# Patient Record
Sex: FEMALE | Race: WHITE | NOT HISPANIC OR LATINO | Employment: FULL TIME | URBAN - METROPOLITAN AREA
[De-identification: names, ages, dates, MRNs, and addresses within clinical notes are randomized per-mention and may not be internally consistent; named-entity substitution may affect disease eponyms.]

---

## 2017-02-23 ENCOUNTER — OFFICE VISIT (OUTPATIENT)
Dept: OBSTETRICS AND GYNECOLOGY | Facility: CLINIC | Age: 42
End: 2017-02-23
Payer: COMMERCIAL

## 2017-02-23 VITALS
DIASTOLIC BLOOD PRESSURE: 70 MMHG | WEIGHT: 293 LBS | SYSTOLIC BLOOD PRESSURE: 110 MMHG | HEIGHT: 66 IN | BODY MASS INDEX: 47.09 KG/M2

## 2017-02-23 DIAGNOSIS — N92.6 IRREGULAR MENSTRUAL BLEEDING: ICD-10-CM

## 2017-02-23 DIAGNOSIS — Z01.419 WELL WOMAN EXAM WITH ROUTINE GYNECOLOGICAL EXAM: Primary | ICD-10-CM

## 2017-02-23 PROCEDURE — 88175 CYTOPATH C/V AUTO FLUID REDO: CPT

## 2017-02-23 PROCEDURE — 99999 PR PBB SHADOW E&M-EST. PATIENT-LVL III: CPT | Mod: PBBFAC,,, | Performed by: OBSTETRICS & GYNECOLOGY

## 2017-02-23 PROCEDURE — 99395 PREV VISIT EST AGE 18-39: CPT | Mod: S$GLB,,, | Performed by: OBSTETRICS & GYNECOLOGY

## 2017-02-23 RX ORDER — TRAMADOL HYDROCHLORIDE 50 MG/1
TABLET ORAL
Refills: 1 | COMMUNITY
Start: 2017-02-08 | End: 2021-06-25

## 2017-02-23 NOTE — MR AVS SNAPSHOT
"    [facility]  [address]  [address], [address]  [place] [address]  Phone: [phone]                  Cristina Sprague   2017 3:15 PM   Office Visit    Description:  Female : 1975   Provider:  Jones Rowley MD   Department:  [facility]           Reason for Visit     Gynecologic Exam                To Do List           Future Appointments        Provider Department Dept Phone    2017 3:15 PM Jones Rowley MD [phone]      Goals (5 Years of Data)     None      [facility]     [facility] On Call Nurse Care Line -  Assistance  Registered nurses in the [facility] On Call Center provide clinical advisement, health education, appointment booking, and other advisory services.  Call for this free service at [phone].             Medications           Message regarding Medications     Verify the changes and/or additions to your medication regime listed below are the same as discussed with your clinician today.  If any of these changes or additions are incorrect, please notify your healthcare provider.        STOP taking these medications     predniSONE (DELTASONE) 10 MG tablet Take 1 tablet (10 mg total) by mouth as directed. Take 3 for 3 days, then  Take 2 for 3 days, then  Take 1 for 3 days, then  Take 1/2 for 4 days, then stop    cyclobenzaprine (FLEXERIL) 5 MG tablet Take 5 mg by mouth 3 (three) times daily.           Verify that the below list of medications is an accurate representation of the medications you are currently taking.  If none reported, the list may be blank. If incorrect, please contact your healthcare provider. Carry this list with you in case of emergency.           Current Medications     tramadol (ULTRAM) 50 mg tablet TAKE 1 TABLET BY MOUTH EVERY 12 TO 24 HOURS AS NEEDED FOR PAIN           Clinical Reference Information           Your Vitals Were     BP Height Weight Last Period BMI    110/70 5' 6" (1.676 m) 146.7 kg " (323 lb 6.6 oz) 02/01/2017 52.2 kg/m2      Blood Pressure          Most Recent Value    BP  110/70      Allergies as of 2/23/2017     No Known Allergies      Immunizations Administered on Date of Encounter - 2/23/2017     None      Language Assistance Services     ATTENTION: Language assistance services are available, free of charge. Please call [phone].      ATENCIÓN: Si habla español, tiene a guzman disposición servicios gratuitos de asistencia lingüística. Llame al [phone].     CHÚ Ý: N?u b?n nói Ti?ng Vi?t, có các d?ch v? h? tr? ngôn ng? mi?n phí dành cho b?n. G?i s? [phone].         [facility] complies with applicable Federal civil rights laws and does not discriminate on the basis of race, color, national origin, age, disability, or sex.

## 2017-02-23 NOTE — PROGRESS NOTES
Subjective:       Patient ID: Cristina Sprague is a 41 y.o. female.    Chief Complaint: Gynecologic Exam (cycle for 19 days just stopped on the  )    Patient last came to the office after the delivery of her most recent child which was 4 years ago.  After delivery she had a tubal ligation (post  tubal).  Patient been having regular periods but most recently had an approximate 20 day episode of spotting.  Last year she has lost 60 pounds (present weight 323).  Examination is normal as best can be done.  No obvious bleeding point notable vaginally or at the cervix.  Irregular bleeding approaching the fifth decade was discussed.  Also effective patient's weight and weight change was discussed.  Plan was formulated so that if the patient sees another episode of prolonged bleeding or other menstrual abnormality, an ultrasound will be ordered to assess the anatomical structures in the pelvis and hormonal management will be started for cycle control.  Tissue assessment will be done if needed based on the ultrasound and other modalities.    HPI  Review of Systems   Gastrointestinal: Negative for abdominal distention, abdominal pain, constipation and nausea.   Genitourinary: Negative for dyspareunia, dysuria, genital sores, pelvic pain, vaginal bleeding and vaginal discharge.       Objective:      Physical Exam   Constitutional: She appears well-developed and well-nourished.   Pulmonary/Chest: Right breast exhibits no mass, no nipple discharge, no skin change and no tenderness. Left breast exhibits no mass, no nipple discharge, no skin change and no tenderness.   Abdominal: Soft. Bowel sounds are normal. She exhibits no distension and no mass. There is no tenderness. There is no rebound and no guarding. Hernia confirmed negative in the right inguinal area and confirmed negative in the left inguinal area.   Genitourinary: Rectum normal, vagina normal and uterus normal. No breast tenderness or discharge. There is no  lesion on the right labia. There is no lesion on the left labia. Uterus is not fixed and not tender. Cervix exhibits no motion tenderness, no discharge and no friability. Right adnexum displays no mass, no tenderness and no fullness. Left adnexum displays no mass, no tenderness and no fullness. No tenderness in the vagina. No vaginal discharge found.   Lymphadenopathy:        Right: No inguinal adenopathy present.        Left: No inguinal adenopathy present.       Assessment:       1. Well woman exam with routine gynecological exam    2. Irregular menstrual bleeding        Plan:         annual GYN visit with Pap smear, mammogram ordered after age 40 is reached in July of this year, conservative management (observation and journaling) for irregular bleeding of one episode.  Patient to call for any change in condition.

## 2017-03-13 ENCOUNTER — TELEPHONE (OUTPATIENT)
Dept: OBSTETRICS AND GYNECOLOGY | Facility: CLINIC | Age: 42
End: 2017-03-13

## 2017-03-13 NOTE — LETTER
March 14, 2017    Cristina Sprague  [address]  [place] [address]             [facility]  [address]  [address], [address]  [place] [address]  Phone: [phone] Dear Ms. Darcie:    The results of your most recent Pap smear are normal. This means that no cancerous or precancerous cells were seen. We recommend that you come back in 1 year for your annual exam and 1 years for your next Pap smear.    If you have any questions or concerns, please don't hesitate to call.    Sincerely,        Jones Anderson

## 2017-03-15 ENCOUNTER — OFFICE VISIT (OUTPATIENT)
Dept: ORTHOPEDICS | Facility: CLINIC | Age: 42
End: 2017-03-15
Payer: COMMERCIAL

## 2017-03-15 VITALS — WEIGHT: 293 LBS | RESPIRATION RATE: 16 BRPM | BODY MASS INDEX: 47.09 KG/M2 | HEIGHT: 66 IN

## 2017-03-15 DIAGNOSIS — M17.11 PRIMARY OSTEOARTHRITIS OF RIGHT KNEE: Primary | ICD-10-CM

## 2017-03-15 DIAGNOSIS — G89.29 CHRONIC PAIN OF RIGHT KNEE: ICD-10-CM

## 2017-03-15 DIAGNOSIS — M25.561 CHRONIC PAIN OF RIGHT KNEE: ICD-10-CM

## 2017-03-15 PROCEDURE — 99999 PR PBB SHADOW E&M-EST. PATIENT-LVL III: CPT | Mod: PBBFAC,,, | Performed by: PHYSICIAN ASSISTANT

## 2017-03-15 PROCEDURE — 1160F RVW MEDS BY RX/DR IN RCRD: CPT | Mod: S$GLB,,, | Performed by: PHYSICIAN ASSISTANT

## 2017-03-15 PROCEDURE — 99203 OFFICE O/P NEW LOW 30 MIN: CPT | Mod: S$GLB,,, | Performed by: PHYSICIAN ASSISTANT

## 2017-03-15 RX ORDER — AZITHROMYCIN 500 MG/1
500 TABLET, FILM COATED ORAL DAILY
COMMUNITY
End: 2018-03-19

## 2017-03-15 NOTE — LETTER
March 15, 2017      Quan Davis MD  [address] [address]           [facility]  [address]  [place] [address]  Phone: [phone]          Patient: Cristina Sprague   MR Number: [id number]   YOB: 1975   Date of Visit: 3/15/2017       Dear Dr. Quan Davis:    Thank you for referring Cristina Sprague to me for evaluation. Attached you will find relevant portions of my assessment and plan of care.    If you have questions, please do not hesitate to call me. I look forward to following Cristina Sprague along with you.    Sincerely,    Nohelia Cobian PA-C    Enclosure  CC:  No Recipients    If you would like to receive this communication electronically, please contact [email or web address] or [phone] to request more information on [facility] access.    For providers and/or their staff who would like to refer a patient to [facility], please contact us through our one-stop-shop provider referral line, [facility], at [phone].    If you feel you have received this communication in error or would no longer like to receive these types of communications, please e-mail [email or web address]

## 2017-03-15 NOTE — PROGRESS NOTES
"  SUBJECTIVE:     Chief Complaint : right knee pain    History of Present Illness:  Cristina Sprague is a 41 y.o. female seen in clinic today with a chief complaint of chronic right knee pain. Pt has been [facility]. [facility]. He has given her steroid injections, most recently 2/8/2017. This helped for a few days only.  He referred her here to discuss TKA. Pain is severe and limits ADL. She has two small children. She has been trying to walk to lose weight but this is becoming too painful. She has lost 65 pounds over the past year. She does not use assistive device.     Pertinent past medical history includes bilateral PE in 2006 that was attributed to taking oral contraceptives. She has had two DVTs since, both during pregnancies. She is not on any long term anticoagulant. She states that she was worked up for coagulopathies at  (?) and that she is positive for MFTHR gene mutation.     Past Medical History:   Diagnosis Date    Ectopic pregnancy     History of blood clots     Morbid obesity     Pulmonary embolism      Review of Systems:  Constitutional: no fever or chills  ENT: no nasal congestion or sore throat  Respiratory: no cough or shortness of breath  Cardiovascular: no chest pain or palpitations  Gastrointestinal: no nausea or vomiting, tolerating diet  Genitourinary: no hematuria or dysuria  Integument/Breast: no rash or pruritis  Hematologic/Lymphatic: no easy bruising or lymphadenopathy  Musculoskeletal: see HPI  Neurological: no seizures or tremors  Behavioral/Psych: no auditory or visual hallucinations    OBJECTIVE:     PHYSICAL EXAM:  Resp. rate 16, height 5' 6" (1.676 m), weight (!) 147.2 kg (324 lb 8.3 oz), last menstrual period 02/01/2017, unknown if currently breastfeeding.   General Appearance: WDWN, NAD  Gait: Abnormal  Neuro/Psych: Mood & affect appropriate  Lungs: Respirations equal and unlabored.   CV: 2+ bilateral upper and lower extremity " pulses.   Skin: Intact throughout LE  Extremities: No LE edema    Right Knee Exam  Range of Motion:0-95 active   Effusion:yes  Condition of skin:intact  Location of tenderness:Medial joint line, Lateral joint line and Patella   Strength:4 of 5 quadriceps strength and 5 of 5 hamstring strength  Stability: LCL: grade II  Significant varus deformity    Left Knee Exam  Range of Motion:0-110 active   Effusion:none  Condition of skin:intact  Location of tenderness:None     Right Hip Examination: no pain with PROM     RADIOGRAPHS: AP, lateral and merchant right knee x-rays reviewed today on disc from Dr. Davis's office. Xrays reveal advanced degenerative change with severe varus deformity and destruction of medial bone     ASSESSMENT/PLAN:   Advanced OA right knee  - Had long discussion with patient about risks of surgery including her obesity (BMI 52) as well as her history of PE and DVT. She understands risks and that she may be unable to have surgery until she loses more weight. I encouraged her to get into a swimming pool for exercise instead of walking. I will discuss with surgeons and call her back next week.

## 2017-03-21 ENCOUNTER — DOCUMENTATION ONLY (OUTPATIENT)
Dept: ORTHOPEDICS | Facility: CLINIC | Age: 42
End: 2017-03-21

## 2017-03-21 ENCOUNTER — PATIENT MESSAGE (OUTPATIENT)
Dept: ORTHOPEDICS | Facility: CLINIC | Age: 42
End: 2017-03-21

## 2017-03-21 DIAGNOSIS — M17.11 PRIMARY OSTEOARTHRITIS OF RIGHT KNEE: Primary | ICD-10-CM

## 2017-03-21 RX ORDER — MELOXICAM 15 MG/1
15 TABLET ORAL DAILY
Qty: 30 TABLET | Refills: 1 | Status: SHIPPED | OUTPATIENT
Start: 2017-03-21 | End: 2017-04-20

## 2017-03-21 NOTE — PROGRESS NOTES
Spoke to patient. Would like her to get into weight loss program, obtain records about coagulopathy workup from [facility]. Offered referral to pain management for geniculate nerve block. Sent prescription for Mobic to pharmacy. She will call us when she decides how she would like to proceed.

## 2017-03-27 ENCOUNTER — PATIENT MESSAGE (OUTPATIENT)
Dept: ORTHOPEDICS | Facility: CLINIC | Age: 42
End: 2017-03-27

## 2017-12-13 ENCOUNTER — TELEPHONE (OUTPATIENT)
Dept: OBSTETRICS AND GYNECOLOGY | Facility: CLINIC | Age: 42
End: 2017-12-13

## 2017-12-13 NOTE — TELEPHONE ENCOUNTER
----- Message from Catrachita Nation sent at 12/13/2017 12:30 PM [facility] -----  No. [phone]    Patient has been having her cycle for 3 weeks.  She is having night sweats also.   She would like an appointment soon.     Please call.

## 2017-12-14 ENCOUNTER — TELEPHONE (OUTPATIENT)
Dept: OBSTETRICS AND GYNECOLOGY | Facility: CLINIC | Age: 42
End: 2017-12-14

## 2017-12-14 NOTE — TELEPHONE ENCOUNTER
----- Message from Chloe Robb sent at 12/14/2017 11:18 AM CST -----  Contact: [phone]  Patient called in returning your call. Please advise.

## 2017-12-15 ENCOUNTER — HOSPITAL ENCOUNTER (OUTPATIENT)
Dept: RADIOLOGY | Facility: HOSPITAL | Age: 42
Discharge: HOME | End: 2017-12-15
Attending: OBSTETRICS & GYNECOLOGY
Payer: COMMERCIAL

## 2017-12-15 DIAGNOSIS — Z01.419 WELL WOMAN EXAM WITH ROUTINE GYNECOLOGICAL EXAM: ICD-10-CM

## 2017-12-15 PROCEDURE — 77067 SCR MAMMO BI INCL CAD: CPT | Mod: TC

## 2017-12-15 PROCEDURE — 77063 BREAST TOMOSYNTHESIS BI: CPT | Mod: 26,,, | Performed by: RADIOLOGY

## 2017-12-15 PROCEDURE — 77067 SCR MAMMO BI INCL CAD: CPT | Mod: 26,,, | Performed by: RADIOLOGY

## 2017-12-22 ENCOUNTER — PATIENT MESSAGE (OUTPATIENT)
Dept: OBSTETRICS AND GYNECOLOGY | Facility: CLINIC | Age: 42
End: 2017-12-22

## 2017-12-22 RX ORDER — FLUCONAZOLE 150 MG/1
150 TABLET ORAL DAILY
Qty: 1 TABLET | Refills: 0 | Status: SHIPPED | OUTPATIENT
Start: 2017-12-22 | End: 2017-12-23

## 2017-12-22 NOTE — TELEPHONE ENCOUNTER
Patient has complaints of a yeast infection. Requesting rx of Diflucan.    Please sign if approved.

## 2018-03-19 ENCOUNTER — PROCEDURE VISIT (OUTPATIENT)
Dept: OBSTETRICS AND GYNECOLOGY | Facility: CLINIC | Age: 43
End: 2018-03-19
Payer: COMMERCIAL

## 2018-03-19 ENCOUNTER — OFFICE VISIT (OUTPATIENT)
Dept: OBSTETRICS AND GYNECOLOGY | Facility: CLINIC | Age: 43
End: 2018-03-19
Payer: COMMERCIAL

## 2018-03-19 VITALS
BODY MASS INDEX: 47.09 KG/M2 | WEIGHT: 293 LBS | HEIGHT: 66 IN | DIASTOLIC BLOOD PRESSURE: 80 MMHG | SYSTOLIC BLOOD PRESSURE: 110 MMHG

## 2018-03-19 DIAGNOSIS — N92.4 EXCESSIVE BLEEDING IN PREMENOPAUSAL PERIOD: Primary | ICD-10-CM

## 2018-03-19 DIAGNOSIS — Z80.3 FAMILY HISTORY OF BREAST CANCER: ICD-10-CM

## 2018-03-19 DIAGNOSIS — N92.4 EXCESSIVE BLEEDING IN PREMENOPAUSAL PERIOD: ICD-10-CM

## 2018-03-19 PROCEDURE — 99213 OFFICE O/P EST LOW 20 MIN: CPT | Mod: 25,S$GLB,, | Performed by: OBSTETRICS & GYNECOLOGY

## 2018-03-19 PROCEDURE — 76830 TRANSVAGINAL US NON-OB: CPT | Mod: S$GLB,,, | Performed by: OBSTETRICS & GYNECOLOGY

## 2018-03-19 PROCEDURE — 99999 PR PBB SHADOW E&M-EST. PATIENT-LVL IV: CPT | Mod: PBBFAC,,, | Performed by: OBSTETRICS & GYNECOLOGY

## 2018-03-19 RX ORDER — APIXABAN 5 MG/1
5 TABLET, FILM COATED ORAL 2 TIMES DAILY
Refills: 11 | COMMUNITY
Start: 2018-02-27

## 2018-03-19 NOTE — PROGRESS NOTES
Patient presents as since today for consultation and assessment for excessive uterine bleeding.  She has bleeding for 2-1/2 weeks per month and it is relatively heavy.  Patient has had another blood clot this time in her lower extremity near the ankle.  She is placed on Eliquis and has been told that she will require anticoagulation essentially for life.  She is concerned about the bleeding and has presented for discussion of options.  Patient has tubal ligation and history of ectopic pregnancy managed by medical means.  Discussion has already occurred between the patient and her primary care physician.  Primary care physician thinks an endometrial ablation would be a good way to control the patient's bleeding.  Other options were discussed with the patient including Mirena IUD and Depo-Provera.  The still present somewhat of a risk due to hormone exposure.  Patient understands that there is a bleeding risk with anticoagulant but the ablation does not require suture or any incision Allis complications are encountered.  Patient will discuss this with her primary care physician to see if it would be safe to stop her medication for a short interval prior to the procedure and started again immediately afterwards.  Ultrasound is done since body habitus precludes a good pelvic examination on this patient.  Also assessment of the internal endometrial cavity is part of our plan for ablation if his to be successful.  Case request will be placed to start moving towards endometrial ablation outpatient procedure.     Uterus by ultrasound shows a 9.6 mm endometrial stripe, a right hydrosalpinx probably residual from her previous ectopic, and the uterine length of 11 cm.  This puts the uterine structure at the upper limits of size for ablation but the benefits would be significant for this patient.  She will consult with her primary care physician about reducing her medications prior to procedure.

## 2018-04-23 ENCOUNTER — OFFICE VISIT (OUTPATIENT)
Dept: URGENT CARE | Facility: CLINIC | Age: 43
End: 2018-04-23
Payer: COMMERCIAL

## 2018-04-23 VITALS
BODY MASS INDEX: 47.09 KG/M2 | TEMPERATURE: 97 F | WEIGHT: 293 LBS | HEIGHT: 66 IN | RESPIRATION RATE: 18 BRPM | OXYGEN SATURATION: 97 % | SYSTOLIC BLOOD PRESSURE: 132 MMHG | HEART RATE: 78 BPM | DIASTOLIC BLOOD PRESSURE: 89 MMHG

## 2018-04-23 DIAGNOSIS — M25.562 ACUTE PAIN OF LEFT KNEE: Primary | ICD-10-CM

## 2018-04-23 PROCEDURE — 99213 OFFICE O/P EST LOW 20 MIN: CPT | Mod: 25,S$GLB,, | Performed by: INTERNAL MEDICINE

## 2018-04-23 PROCEDURE — 96372 THER/PROPH/DIAG INJ SC/IM: CPT | Mod: S$GLB,,, | Performed by: INTERNAL MEDICINE

## 2018-04-23 RX ORDER — NAPROXEN 500 MG/1
500 TABLET ORAL 2 TIMES DAILY WITH MEALS
Qty: 20 TABLET | Refills: 0 | Status: SHIPPED | OUTPATIENT
Start: 2018-04-24 | End: 2018-05-04

## 2018-04-23 RX ORDER — KETOROLAC TROMETHAMINE 30 MG/ML
60 INJECTION, SOLUTION INTRAMUSCULAR; INTRAVENOUS
Status: COMPLETED | OUTPATIENT
Start: 2018-04-23 | End: 2018-04-23

## 2018-04-23 RX ADMIN — KETOROLAC TROMETHAMINE 60 MG: 30 INJECTION, SOLUTION INTRAMUSCULAR; INTRAVENOUS at 10:04

## 2018-04-23 NOTE — LETTER
April 23, 2018      [facility] [place]  [address]  [place] [address]  Phone: [phone]  Fax: [phone]       Patient: Cristina Sprague   YOB: 1975  Date of Visit: 04/23/2018    To Whom It May Concern:    Evy Sprague  was at [facility] on 04/23/2018. She may return to work/school on 4/25/2018 with no restrictions. If you have any questions or concerns, or if I can be of further assistance, please do not hesitate to contact me.    Sincerely,    Ramses Blank MD

## 2018-04-23 NOTE — PROGRESS NOTES
"Subjective:       Patient ID: Cristina Sprague is a 42 y.o. female.    Vitals:  height is 5' 6" (1.676 m) and weight is 145.2 kg (320 lb) (abnormal). Her temperature is 97.1 °F (36.2 °C). Her blood pressure is 132/89 and her pulse is 78. Her respiration is 18 and oxygen saturation is 97%.     Chief Complaint: Knee Pain    Left knee pain that began on Friday,no trauma      Knee Pain    The incident occurred 3 to 5 days ago. There was no injury mechanism. The pain is present in the left knee. The pain is at a severity of 10/10. The pain is severe. The pain has been constant since onset. She reports no foreign bodies present. The symptoms are aggravated by movement. She has tried acetaminophen for the symptoms. The treatment provided no relief.     Review of Systems   Constitution: Negative for chills and fever.   HENT: Negative for sore throat.    Eyes: Negative for blurred vision.   Cardiovascular: Negative for chest pain.   Respiratory: Negative for shortness of breath.    Skin: Negative for rash.   Musculoskeletal: Positive for joint pain. Negative for back pain.   Gastrointestinal: Negative for abdominal pain, diarrhea, nausea and vomiting.   Neurological: Negative for headaches.   Psychiatric/Behavioral: The patient is not nervous/anxious.        Objective:      Physical Exam   Constitutional: She appears well-developed and well-nourished.   Cardiovascular: Intact distal pulses.    Musculoskeletal:   Pain L knee with ROM   Nursing note and vitals reviewed.      Assessment:       1. Acute pain of left knee        Plan:         Acute pain of left knee  -     X-Ray Knee 3 View Left; Future; Expected date: 04/23/2018    Other orders  -     ketorolac injection 60 mg; Inject 2 mLs (60 mg total) into the muscle one time.  -     naproxen (NAPROSYN) 500 MG tablet; Take 1 tablet (500 mg total) by mouth 2 (two) times daily with meals.  Dispense: 20 tablet; Refill: 0          "

## 2018-05-31 ENCOUNTER — TELEPHONE (OUTPATIENT)
Dept: ADMINISTRATIVE | Facility: OTHER | Age: 43
End: 2018-05-31

## 2018-06-22 ENCOUNTER — TELEPHONE (OUTPATIENT)
Dept: INTERNAL MEDICINE | Facility: CLINIC | Age: 43
End: 2018-06-22

## 2018-06-22 NOTE — TELEPHONE ENCOUNTER
----- Message from Eliane Modi sent at 6/22/2018  1:51 PM CDT -----  Contact: Diana with Dr Black Office/[phone] ext [phone]  Diana called in regards needing to find out If patient can be off ELIQUIS 5 mg Tab for 2-3 days. Patient is having an injection on Tuesday 06/26/18. Please call and advise. Thank you

## 2018-06-22 NOTE — TELEPHONE ENCOUNTER
----- Message from Eliane Modi sent at 6/22/2018  1:51 PM CDT -----  Contact: Diana with Dr Black Office/[phone] ext [phone]  Diana called in regards needing to find out If patient can be off ELIQUIS 5 mg Tab for 2-3 days. Patient is having an injection on Tuesday 06/26/18. Please call and advise. Thank you

## 2018-06-25 ENCOUNTER — TELEPHONE (OUTPATIENT)
Dept: INTERNAL MEDICINE | Facility: CLINIC | Age: 43
End: 2018-06-25

## 2019-08-29 ENCOUNTER — TELEPHONE (OUTPATIENT)
Dept: ORTHOPEDICS | Facility: CLINIC | Age: 44
End: 2019-08-29

## 2019-08-29 NOTE — TELEPHONE ENCOUNTER
"Contacted patient regarding her appointment scheduled as "knee surgery." Patient expressed she is looking to see a surgeon for her knee pain. I explained that Dr. Hardwick is not a surgeon but he would still be happy to see her and refer to the appropirate physician. She expressed she would like to see a surgeon directly and requested I provide her with a name. I suggested Dr. Oscar and Dr. Sagastume. Patient expressed her understanding.   "

## 2019-12-17 ENCOUNTER — OFFICE VISIT (OUTPATIENT)
Dept: URGENT CARE | Facility: CLINIC | Age: 44
End: 2019-12-17
Payer: MEDICAID

## 2019-12-17 VITALS
SYSTOLIC BLOOD PRESSURE: 118 MMHG | HEIGHT: 66 IN | HEART RATE: 89 BPM | RESPIRATION RATE: 16 BRPM | BODY MASS INDEX: 47.09 KG/M2 | DIASTOLIC BLOOD PRESSURE: 83 MMHG | TEMPERATURE: 97 F | OXYGEN SATURATION: 100 % | WEIGHT: 293 LBS

## 2019-12-17 DIAGNOSIS — M62.838 MUSCLE SPASM: ICD-10-CM

## 2019-12-17 DIAGNOSIS — M54.9 BACK PAIN, UNSPECIFIED BACK LOCATION, UNSPECIFIED BACK PAIN LATERALITY, UNSPECIFIED CHRONICITY: ICD-10-CM

## 2019-12-17 DIAGNOSIS — S29.011A MUSCLE STRAIN OF CHEST WALL, INITIAL ENCOUNTER: Primary | ICD-10-CM

## 2019-12-17 DIAGNOSIS — Z20.828 EXPOSURE TO THE FLU: ICD-10-CM

## 2019-12-17 LAB
B-HCG UR QL: NEGATIVE
BILIRUB UR QL STRIP: NEGATIVE
CTP QC/QA: YES
CTP QC/QA: YES
FLUAV AG NPH QL: NEGATIVE
FLUBV AG NPH QL: NEGATIVE
GLUCOSE UR QL STRIP: NEGATIVE
KETONES UR QL STRIP: NEGATIVE
LEUKOCYTE ESTERASE UR QL STRIP: NEGATIVE
PH, POC UA: 6.5 (ref 5–8)
POC BLOOD, URINE: NEGATIVE
POC NITRATES, URINE: NEGATIVE
PROT UR QL STRIP: NEGATIVE
SP GR UR STRIP: 1.02 (ref 1–1.03)
UROBILINOGEN UR STRIP-ACNC: NORMAL (ref 0.1–1.1)

## 2019-12-17 PROCEDURE — 99214 OFFICE O/P EST MOD 30 MIN: CPT | Mod: 25,S$GLB,, | Performed by: NURSE PRACTITIONER

## 2019-12-17 PROCEDURE — 81003 POCT URINALYSIS, DIPSTICK, AUTOMATED, W/O SCOPE: ICD-10-PCS | Mod: QW,S$GLB,, | Performed by: NURSE PRACTITIONER

## 2019-12-17 PROCEDURE — 87804 POCT INFLUENZA A/B: ICD-10-PCS | Mod: QW,S$GLB,, | Performed by: NURSE PRACTITIONER

## 2019-12-17 PROCEDURE — 81025 URINE PREGNANCY TEST: CPT | Mod: S$GLB,,, | Performed by: NURSE PRACTITIONER

## 2019-12-17 PROCEDURE — 99214 PR OFFICE/OUTPT VISIT, EST, LEVL IV, 30-39 MIN: ICD-10-PCS | Mod: 25,S$GLB,, | Performed by: NURSE PRACTITIONER

## 2019-12-17 PROCEDURE — 81003 URINALYSIS AUTO W/O SCOPE: CPT | Mod: QW,S$GLB,, | Performed by: NURSE PRACTITIONER

## 2019-12-17 PROCEDURE — 81025 POCT URINE PREGNANCY: ICD-10-PCS | Mod: S$GLB,,, | Performed by: NURSE PRACTITIONER

## 2019-12-17 PROCEDURE — 87804 INFLUENZA ASSAY W/OPTIC: CPT | Mod: QW,S$GLB,, | Performed by: NURSE PRACTITIONER

## 2019-12-17 RX ORDER — OSELTAMIVIR PHOSPHATE 75 MG/1
75 CAPSULE ORAL 2 TIMES DAILY
Qty: 10 CAPSULE | Refills: 0 | Status: SHIPPED | OUTPATIENT
Start: 2019-12-17 | End: 2021-06-25

## 2019-12-17 RX ORDER — METHOCARBAMOL 500 MG/1
500 TABLET, FILM COATED ORAL 3 TIMES DAILY PRN
Qty: 15 TABLET | Refills: 0 | Status: SHIPPED | OUTPATIENT
Start: 2019-12-17 | End: 2019-12-22

## 2019-12-17 RX ORDER — NAPROXEN 500 MG/1
500 TABLET ORAL 2 TIMES DAILY WITH MEALS
Qty: 14 TABLET | Refills: 0 | Status: SHIPPED | OUTPATIENT
Start: 2019-12-17 | End: 2019-12-24

## 2019-12-17 RX ORDER — KETOROLAC TROMETHAMINE 30 MG/ML
60 INJECTION, SOLUTION INTRAMUSCULAR; INTRAVENOUS
Status: COMPLETED | OUTPATIENT
Start: 2019-12-17 | End: 2019-12-17

## 2019-12-17 RX ADMIN — KETOROLAC TROMETHAMINE 60 MG: 30 INJECTION, SOLUTION INTRAMUSCULAR; INTRAVENOUS at 11:12

## 2019-12-17 NOTE — PATIENT INSTRUCTIONS
PLEASE READ YOUR DISCHARGE INSTRUCTIONS ENTIRELY AS IT CONTAINS IMPORTANT INFORMATION.       DO NOT TAKE ANY MORE NAPROXEN OR IBUPROFEN FOR 24 HOURS AS YOU RECEIVED A LARGE DOSE OF IT VIA INJECTION    Naproxen for pain. Eat with this medication. Consider taking an over the counter antacid (zantac, Nexium, Prilosec) to protect your stomach.      Ice to the area. Try an over the counter pain cream like salon pas, icy hot, bioflex.    Robaxin is a muscle relaxer - this medication will make you drowsy. Please do not drive or operate machinery while taking this. Best to take it at night or during the day if you are not driving or going to work. Please take a half first to see how it affects you.     Avoid heavy lifting, straining.     Take Tamiflu as prescribed.     Please follow up with your regular doctor for further treatment if your symptoms do not improve.      Please arrange follow up with your primary medical clinic as soon as possible. You must understand that you've received an Urgent Care treatment only and that you may be released before all of your medical problems are known or treated. You, the patient, will arrange for follow up as instructed. If your symptoms worsen or fail to improve you should go to the Emergency Room.  WE CANNOT RULE OUT ALL POSSIBLE CAUSES OF YOUR SYMPTOMS IN THE URGENT CARE SETTING PLEASE GO TO THE ER IF YOU FEELS YOUR CONDITION IS WORSENING OR YOU WOULD LIKE EMERGENT EVALUATION.          Chest Strain    You have a chest strain. This happens when the muscles between the ribs stretch and tear. This may occur when you have a severe cough. It may also happen after strenuous lifting or twisting injuries of the upper back.  A chest strain usually causes pain when you move or take a deep breath. The strain may take a few days to a few weeks to heal.  Home care  Follow these guidelines when caring for yourself at home:  · Rest. Dont do any heavy lifting or strenuous activity. Dont do any  activity that causes pain.  · If you have a severe cough, use a cough syrup with dextromethorphan, unless another cough medicine was prescribed. If you have high blood pressure, check with your health care provider or pharmacist before using an over-the-counter cough medicine.  · You may use acetaminophen or ibuprofen to control pain, unless another medicine was prescribed. If you have chronic liver or kidney disease, talk with your provider before using these medicines. Also talk with your provider if youve had a stomach ulcer or GI bleeding.  Follow-up care  Follow up with your health care provider, or as advised.  When to seek medical advice  Call your health care provider right away if any of these occur:  · A change in the type of pain. This means if it feels different, gets worse, lasts longer, or begins to spread into your shoulder, arm, neck, jaw, or back.  · Pain doesnt go away in 1 week  · Shortness of breath, difficulty breathing, or fast breathing  · Pain gets worse when you breathe  · Cough with dark-colored sputum (phlegm) or blood  · Weakness, dizziness, or fainting  · Fever of 101ºF (38.3ºC) or higher, or as directed by your health care provider   Date Last Reviewed: 2/15/2015  © 8861-2982 [email or web address]. [address] [place] [address], [place] [address]. All rights reserved. This information is not intended as a substitute for professional medical care. Always follow your healthcare professional's instructions.

## 2019-12-17 NOTE — PROGRESS NOTES
"Subjective:       Patient ID: Cristina Sprague is a 44 y.o. female.    Vitals:  height is 5' 6" (1.676 m) and weight is 145.2 kg (320 lb) (abnormal). Her oral temperature is 97 °F (36.1 °C). Her blood pressure is 118/83 and her pulse is 89. Her respiration is 16 and oxygen saturation is 100%.     Chief Complaint: Back Pain    Pt here today for c/o right side pain that began last night. Pt describes the pain as shooting from anterior right ribs around side. Pt also concerned about flu exposure. She endorses sinus pressure, frontal headache, and PND that began today. She took Toradol for symptoms last night, but has not had anything yet today. Pain has improved since last night. She denies abdominal pain, CVA tenderness, N/V/D, rash, urinary urgency, frequency, dysuria, hematuria, or foul odor to urine.    Back Pain   This is a new problem. Episode onset: 3 days. The problem has been gradually worsening since onset. The pain is present in the costovertebral angle. The quality of the pain is described as shooting and stabbing. Radiates to: Radiates from anterior right ribs to side. The pain is at a severity of 5/10. The pain is moderate. The pain is the same all the time. Pertinent negatives include no abdominal pain, bladder incontinence, bowel incontinence or numbness. Risk factors include obesity. She has tried NSAIDs (Toradol) for the symptoms. The treatment provided mild relief.       Constitution: Negative for fatigue.   Gastrointestinal: Negative for abdominal pain and bowel incontinence.   Genitourinary: Negative for urgency, bladder incontinence and hematuria.   Musculoskeletal: Positive for back pain. Negative for muscle cramps and history of spine disorder.   Skin: Negative for rash.   Neurological: Negative for coordination disturbances, numbness and tingling.       Objective:      Physical Exam   Constitutional: She is oriented to person, place, and time. Vital signs are normal. She appears well-developed " and well-nourished. She is active and cooperative.  Non-toxic appearance. She does not have a sickly appearance. She does not appear ill. No distress.   Pt calm and cooperative. NAD noted.   HENT:   Head: Normocephalic and atraumatic.   Right Ear: Hearing, tympanic membrane, external ear and ear canal normal.   Left Ear: Hearing, tympanic membrane, external ear and ear canal normal.   Nose: Mucosal edema (pale boggy) present. No rhinorrhea or nasal deformity. No epistaxis. Right sinus exhibits no maxillary sinus tenderness and no frontal sinus tenderness. Left sinus exhibits no maxillary sinus tenderness and no frontal sinus tenderness.   Mouth/Throat: Uvula is midline, oropharynx is clear and moist and mucous membranes are normal. No trismus in the jaw. Normal dentition. No uvula swelling. Cobblestoning present. No oropharyngeal exudate, posterior oropharyngeal edema or posterior oropharyngeal erythema. Tonsils are 1+ on the right. Tonsils are 1+ on the left. No tonsillar exudate.   Eyes: Conjunctivae and lids are normal. No scleral icterus.   Neck: Trachea normal, normal range of motion, full passive range of motion without pain and phonation normal. Neck supple. No neck rigidity. No edema and no erythema present.   Cardiovascular: Normal rate, regular rhythm, normal heart sounds, intact distal pulses and normal pulses.   Pulmonary/Chest: Effort normal and breath sounds normal. No stridor. No respiratory distress. She has no decreased breath sounds. She has no wheezes. She has no rhonchi. She has no rales. Chest wall is not dull to percussion. She exhibits tenderness. She exhibits no mass, no bony tenderness, no laceration, no crepitus, no edema, no deformity, no swelling and no retraction.       Abdominal: Soft. Normal appearance and bowel sounds are normal. She exhibits no abdominal bruit, no pulsatile midline mass and no mass. There is no tenderness. There is no rigidity, no rebound, no guarding and no CVA  tenderness.   Musculoskeletal: Normal range of motion. She exhibits no edema or deformity.   Lymphadenopathy:        Head (right side): No submental, no submandibular, no tonsillar, no preauricular and no posterior auricular adenopathy present.        Head (left side): No submental, no submandibular, no tonsillar, no preauricular and no posterior auricular adenopathy present.     She has no cervical adenopathy.   Neurological: She is alert and oriented to person, place, and time. She has normal strength and normal reflexes. No sensory deficit. She exhibits normal muscle tone. Coordination normal.   Skin: Skin is warm, dry, intact, not diaphoretic and not pale.   Psychiatric: She has a normal mood and affect. Her speech is normal and behavior is normal. Judgment and thought content normal. Cognition and memory are normal.   Nursing note and vitals reviewed.        Results for orders placed or performed in visit on 12/17/19   POCT Influenza A/B   Result Value Ref Range    Rapid Influenza A Ag Negative Negative    Rapid Influenza B Ag Negative Negative     Acceptable Yes    POCT urine pregnancy   Result Value Ref Range    POC Preg Test, Ur Negative Negative     Acceptable Yes    POCT Urinalysis, Dipstick, Automated, W/O Scope   Result Value Ref Range    POC Blood, Urine Negative Negative    POC Bilirubin, Urine Negative Negative    POC Urobilinogen, Urine Normal 0.1 - 1.1    POC Ketones, Urine Negative Negative    POC Protein, Urine Negative Negative    POC Nitrates, Urine Negative Negative    POC Glucose, Urine Negative Negative    pH, UA 6.5 5 - 8    POC Specific Gravity, Urine 1.025 1.003 - 1.029    POC Leukocytes, Urine Negative Negative     Assessment:       1. Muscle strain of chest wall, initial encounter    2. Muscle spasm    3. Exposure to the flu    4. Back pain, unspecified back location, unspecified back pain laterality, unspecified chronicity        Plan:         Muscle strain  of chest wall, initial encounter  -     ketorolac injection 60 mg  -     naproxen (NAPROSYN) 500 MG tablet; Take 1 tablet (500 mg total) by mouth 2 (two) times daily with meals. for 7 days  Dispense: 14 tablet; Refill: 0    Muscle spasm  -     methocarbamol (ROBAXIN) 500 MG Tab; Take 1 tablet (500 mg total) by mouth 3 (three) times daily as needed.  Dispense: 15 tablet; Refill: 0    Exposure to the flu  -     oseltamivir (TAMIFLU) 75 MG capsule; Take 1 capsule (75 mg total) by mouth 2 (two) times daily.  Dispense: 10 capsule; Refill: 0    Back pain, unspecified back location, unspecified back pain laterality, unspecified chronicity  -     POCT Influenza A/B  -     POCT urine pregnancy  -     POCT Urinalysis, Dipstick, Automated, W/O Scope    Discussed risks vs benefits of presumptively treating for flu. Pt verbalizes understanding, and would like to proceed with treatment using Tamiflu.     Patient Instructions     PLEASE READ YOUR DISCHARGE INSTRUCTIONS ENTIRELY AS IT CONTAINS IMPORTANT INFORMATION.       DO NOT TAKE ANY MORE NAPROXEN OR IBUPROFEN FOR 24 HOURS AS YOU RECEIVED A LARGE DOSE OF IT VIA INJECTION    Naproxen for pain. Eat with this medication. Consider taking an over the counter antacid (zantac, Nexium, Prilosec) to protect your stomach.      Ice to the area. Try an over the counter pain cream like salon pas, icy hot, bioflex.    Robaxin is a muscle relaxer - this medication will make you drowsy. Please do not drive or operate machinery while taking this. Best to take it at night or during the day if you are not driving or going to work. Please take a half first to see how it affects you.     Avoid heavy lifting, straining.     Take Tamiflu as prescribed.     Please follow up with your regular doctor for further treatment if your symptoms do not improve.      Please arrange follow up with your primary medical clinic as soon as possible. You must understand that you've received an Urgent Care treatment  only and that you may be released before all of your medical problems are known or treated. You, the patient, will arrange for follow up as instructed. If your symptoms worsen or fail to improve you should go to the Emergency Room.  WE CANNOT RULE OUT ALL POSSIBLE CAUSES OF YOUR SYMPTOMS IN THE URGENT CARE SETTING PLEASE GO TO THE ER IF YOU FEELS YOUR CONDITION IS WORSENING OR YOU WOULD LIKE EMERGENT EVALUATION.          Chest Strain    You have a chest strain. This happens when the muscles between the ribs stretch and tear. This may occur when you have a severe cough. It may also happen after strenuous lifting or twisting injuries of the upper back.  A chest strain usually causes pain when you move or take a deep breath. The strain may take a few days to a few weeks to heal.  Home care  Follow these guidelines when caring for yourself at home:  · Rest. Dont do any heavy lifting or strenuous activity. Dont do any activity that causes pain.  · If you have a severe cough, use a cough syrup with dextromethorphan, unless another cough medicine was prescribed. If you have high blood pressure, check with your health care provider or pharmacist before using an over-the-counter cough medicine.  · You may use acetaminophen or ibuprofen to control pain, unless another medicine was prescribed. If you have chronic liver or kidney disease, talk with your provider before using these medicines. Also talk with your provider if youve had a stomach ulcer or GI bleeding.  Follow-up care  Follow up with your health care provider, or as advised.  When to seek medical advice  Call your health care provider right away if any of these occur:  · A change in the type of pain. This means if it feels different, gets worse, lasts longer, or begins to spread into your shoulder, arm, neck, jaw, or back.  · Pain doesnt go away in 1 week  · Shortness of breath, difficulty breathing, or fast breathing  · Pain gets worse when you breathe  · Cough  with dark-colored sputum (phlegm) or blood  · Weakness, dizziness, or fainting  · Fever of 101ºF (38.3ºC) or higher, or as directed by your health care provider   Date Last Reviewed: 2/15/2015  © 0703-7014 [employer]. [address] [place] [address]. All rights reserved. This information is not intended as a substitute for professional medical care. Always follow your healthcare professional's instructions.

## 2019-12-19 ENCOUNTER — OFFICE VISIT (OUTPATIENT)
Dept: URGENT CARE | Facility: CLINIC | Age: 44
End: 2019-12-19
Payer: MEDICAID

## 2019-12-19 VITALS
HEART RATE: 99 BPM | HEIGHT: 66 IN | BODY MASS INDEX: 47.09 KG/M2 | RESPIRATION RATE: 18 BRPM | WEIGHT: 293 LBS | TEMPERATURE: 98 F | OXYGEN SATURATION: 100 %

## 2019-12-19 DIAGNOSIS — B02.9 HERPES ZOSTER WITHOUT COMPLICATION: Primary | ICD-10-CM

## 2019-12-19 PROCEDURE — 99213 OFFICE O/P EST LOW 20 MIN: CPT | Mod: S$GLB,,, | Performed by: NURSE PRACTITIONER

## 2019-12-19 PROCEDURE — 99213 PR OFFICE/OUTPT VISIT, EST, LEVL III, 20-29 MIN: ICD-10-PCS | Mod: S$GLB,,, | Performed by: NURSE PRACTITIONER

## 2019-12-19 RX ORDER — VALACYCLOVIR HYDROCHLORIDE 1 G/1
1000 TABLET, FILM COATED ORAL 3 TIMES DAILY
Qty: 21 TABLET | Refills: 0 | Status: SHIPPED | OUTPATIENT
Start: 2019-12-19 | End: 2021-06-25

## 2019-12-19 NOTE — PATIENT INSTRUCTIONS

## 2019-12-19 NOTE — PROGRESS NOTES
"Subjective:       Patient ID: Cristina Sprague is a 44 y.o. female.    Vitals:  height is 5' 6" (1.676 m) and weight is 145.2 kg (320 lb) (abnormal). Her temperature is 97.9 °F (36.6 °C). Her pulse is 99. Her respiration is 18 and oxygen saturation is 100%.     Chief Complaint: Rash    Ambulatory with c/o pain to right side - was treated at urgent care for muscle strain but then two days later developed this rash to abdomen    Rash   This is a new problem. The current episode started in the past 7 days. The problem has been gradually worsening since onset. The affected locations include the right arm and torso. The rash is characterized by pain and redness. It is unknown if there was an exposure to a precipitant. Pertinent negatives include no cough, fever or sore throat. Past treatments include nothing.       Constitution: Negative for chills and fever.   HENT: Negative for facial swelling and sore throat.    Neck: Negative for painful lymph nodes.   Eyes: Negative for eye itching and eyelid swelling.   Respiratory: Negative for cough.    Musculoskeletal: Negative for joint pain and joint swelling.   Skin: Positive for rash. Negative for color change, pale, wound, abrasion, laceration, lesion, skin thickening/induration, puncture wound, erythema, bruising, abscess, avulsion and hives.   Allergic/Immunologic: Negative for environmental allergies, immunocompromised state and hives.   Hematologic/Lymphatic: Negative for swollen lymph nodes.       Objective:      Physical Exam   Constitutional: She is oriented to person, place, and time. She appears well-developed and well-nourished. She is cooperative.  Non-toxic appearance. She does not appear ill. No distress.   HENT:   Head: Normocephalic and atraumatic.   Right Ear: Hearing, tympanic membrane, external ear and ear canal normal.   Left Ear: Hearing, tympanic membrane, external ear and ear canal normal.   Nose: Nose normal. No mucosal edema, rhinorrhea or nasal " deformity. No epistaxis. Right sinus exhibits no maxillary sinus tenderness and no frontal sinus tenderness. Left sinus exhibits no maxillary sinus tenderness and no frontal sinus tenderness.   Mouth/Throat: Uvula is midline, oropharynx is clear and moist and mucous membranes are normal. No trismus in the jaw. Normal dentition. No uvula swelling. No posterior oropharyngeal erythema.   Eyes: Conjunctivae and lids are normal. Right eye exhibits no discharge. Left eye exhibits no discharge. No scleral icterus.   Neck: Trachea normal, normal range of motion, full passive range of motion without pain and phonation normal. Neck supple.   Cardiovascular: Normal rate, regular rhythm, normal heart sounds, intact distal pulses and normal pulses.   Pulmonary/Chest: Effort normal and breath sounds normal. No respiratory distress.   Abdominal: Soft. Normal appearance and bowel sounds are normal. She exhibits no distension, no pulsatile midline mass and no mass. There is no tenderness.   Musculoskeletal: Normal range of motion. She exhibits no edema or deformity.        Arms:  Neurological: She is alert and oriented to person, place, and time. She exhibits normal muscle tone. Coordination normal.   Skin: Skin is warm, dry, intact, not diaphoretic and not pale. erythema  Psychiatric: She has a normal mood and affect. Her speech is normal and behavior is normal. Judgment and thought content normal. Cognition and memory are normal.   Nursing note and vitals reviewed.        Assessment:       1. Herpes zoster without complication        Plan:         Herpes zoster without complication      Patient Instructions       Shingles (Herpes Zoster)     Talk to your healthcare provider about the shingles vaccine.     Shingles is also called herpes zoster. It is a painful skin rash caused by the herpes zoster virus. This is the same virus that causes chickenpox. After a person has chickenpox, the virus remains inactive in the nerve cells.  Years later, the virus can become active again and travel to the skin. Most people have shingles only once, but it is possible to have it more than once.  What are the risk factors for shingles?  Anyone who has ever had chickenpox can develop shingles. But your risk is greater if you:  · Are 50 years of age or older  · Have an illness that weakens your immune system, such as HIV/AIDS  · Have cancer, especially Hodgkin disease or lymphoma  · Take medicines that weaken your immune system  What are the symptoms of shingles?  · The first sign of shingles is usually pain, burning, tingling, or itching on one part of your face or body. You may also feel as if you have the flu, with fever and chills.  · A red rash with small blisters appears within a few days. The rash may appear as follows:   ¨ The blisters can occur anywhere, but theyre most common on the back, chest, or abdomen.  ¨ They usually appear on only one side of the body, spreading along the nerve pathway where the virus was inactive.   ¨ The rash can also form around an eye, along one side of the face or neck, or in the mouth.  ¨ In a few people, usually those with weakened immune systems, shingles appear on more than one part of the body at once.  · After a few days, the blisters become dry and form a crust. The crust falls off in days to weeks. The blisters generally do not leave scars.  How is shingles treated?  For most people, shingles heals on its own in a few weeks. But treatment is recommended to help relieve pain, speed healing, and reduce the risk of complications. Antiviral medicines are prescribed within the first 72 hours of the appearance of the rash. To lessen symptoms:  · Apply ice packs (wrapped in a thin towel) or cool compresses, or soak in a cool bath.  · Use calamine lotion to calm itchy skin.  · Ask your healthcare provider about over-the-counter pain relievers. If your pain is severe, your healthcare provider may prescribe stronger pain  medicines.  What are the complications of shingles?  Shingles often goes away with no lasting effects. But some people have serious problems long after the blisters have healed:  · Postherpetic neuralgia. This is the most common complication. It is severe nerve pain at the place where the rash used to be. It can last for months, or even years after you have had shingles. Medicines can be prescribed to help relieve the pain and improve quality of life.  · Bacterial infection. Shingles blisters may become infected with bacteria. Antibiotic medicine is used to treat the infection.  · Eye problems. A person with shingles on the face should see his or her healthcare provider right away. Shingles can cause serious problems with vision, and even blindness.  Very rarely shingles can also lead to pneumonia, hearing problems, brain inflammation, or even death.   When to seek medical care  Contact your healthcare provider if you experience any of the following:  · Symptoms that dont go away with treatment  · A rash or blisters near your eye  · Increased drainage, fever, or rash after treatment, or severe pain that doesnt go away   How can shingles be prevented?  You can only get shingles if you have had chicken pox in the past. Those who have never had chickenpox can get the virus from you. Although instead of developing shingles, the person may get chickenpox. Until your blisters form scabs, avoid contact with others, especially the following:  · Pregnant women who have never had chickenpox or the vaccine  · Infants who were born early (prematurely) or who had low weight at birth  · People with weak immune system (for example, people receiving chemotherapy for cancer, people who have had organ transplants, or people with HIV infections)     The shingles vaccine  If youre 60 years of age or older, ask your healthcare provider if you should receive the shingles vaccine. The vaccine makes it less likely that you will develop  shingles. If you do develop shingles, your symptoms will likely be milder than if you hadnt been vaccinated. Note: Although the vaccine is licensed for people 50 years of age or older, the CDC does not recommend the vaccine for those who are 50 to 59 years old.   Date Last Reviewed: 10/1/2016  © 8830-9895 [employer]. [address] [place] [address], [place] [address]. All rights reserved. This information is not intended as a substitute for professional medical care. Always follow your healthcare professional's instructions.

## 2020-01-15 ENCOUNTER — OFFICE VISIT (OUTPATIENT)
Dept: URGENT CARE | Facility: CLINIC | Age: 45
End: 2020-01-15
Payer: MEDICAID

## 2020-01-15 VITALS
WEIGHT: 293 LBS | SYSTOLIC BLOOD PRESSURE: 128 MMHG | OXYGEN SATURATION: 98 % | HEIGHT: 66 IN | HEART RATE: 78 BPM | DIASTOLIC BLOOD PRESSURE: 72 MMHG | RESPIRATION RATE: 16 BRPM | TEMPERATURE: 98 F | BODY MASS INDEX: 47.09 KG/M2

## 2020-01-15 DIAGNOSIS — B37.2 CANDIDAL INTERTRIGO: Primary | ICD-10-CM

## 2020-01-15 PROCEDURE — 99213 PR OFFICE/OUTPT VISIT, EST, LEVL III, 20-29 MIN: ICD-10-PCS | Mod: S$GLB,,, | Performed by: PHYSICIAN ASSISTANT

## 2020-01-15 PROCEDURE — 99213 OFFICE O/P EST LOW 20 MIN: CPT | Mod: S$GLB,,, | Performed by: PHYSICIAN ASSISTANT

## 2020-01-15 RX ORDER — TRIAMCINOLONE ACETONIDE 1 MG/G
OINTMENT TOPICAL 2 TIMES DAILY
Qty: 1 TUBE | Refills: 0 | Status: SHIPPED | OUTPATIENT
Start: 2020-01-15 | End: 2021-06-25

## 2020-01-15 RX ORDER — KETOCONAZOLE 20 MG/G
CREAM TOPICAL DAILY
Qty: 60 G | Refills: 0 | Status: SHIPPED | OUTPATIENT
Start: 2020-01-15 | End: 2021-06-25

## 2020-01-15 NOTE — PATIENT INSTRUCTIONS
PLEASE READ YOUR DISCHARGE INSTRUCTIONS ENTIRELY AS IT CONTAINS IMPORTANT INFORMATION.  - Rest.    - Drink plenty of fluids.    - Tylenol or Ibuprofen as directed as needed for fever/pain.    - If you were prescribed antibiotics, please take them to completion.  - If you are female and on birth control pills - please use additional methods of contraception to prevent pregnancy while on antibiotics and for one cycle after.   - If you were prescribed a narcotic medication or muscle relaxer, do not drive or operate heavy equipment or machinery while taking these medications, as they can cause drowsiness.   - If you smoke, please stop smoking.  -You must understand that you've received an Urgent Care treatment only and that you may be released before all your medical problems are known or treated. You, the patient, will    arrange for follow up care as instructed. Please arrange follow up with your primary medical clinic as soon as possible.   - Follow up with your PCP or specialty clinic as directed in the next 1-2 weeks if not improved or as needed.  You can call [phone] to schedule an appointment with the appropriate provider.    - Please return to Urgent Care or to the Emergency Department if your symptoms worsen.    Patient aware and verbalized understanding.    Candida Skin Infection (Adult)  Candida is type of yeast. It grows naturally on the skin and in the mouth. If it grows out of control, it can cause an infection. Candida can cause infections in the genital area, skin folds, in the mouth, and under the breasts. Anyone can get this infection. It is more common in a person with a weak immune system, such as from diabetes, HIV, or cancer. Its also more common in someone who has been on antibiotic therapy. And its more common people who are overweight or who have incontinence. Wearing tight-fitting clothing and taking part in activities with lots of skin-to-skin contact can also put you at  risk.  Candida causes the skin to become bright red and inflamed. The border of the infected part of the skin is often raised. The infection causes pain and itching. Sometimes the skin peels and bleeds. In the mouth, candida is called thrush, and may cause white thickened areas.  A Candida rash is most often treated with an antifungal cream or ointment. The rash will clear a few days after starting the medicine. Infections that dont go away may need a prescription medicine. In rare cases, a bacterial infection can also occur.  Home care  Your healthcare provider will recommend an antifungal cream or ointment for the rash. He or she may also prescribe a medicine for the itch. Follow all instructions for using these medicines. Dont use cornstarch powder. Cornstarch can cause the Candida infection to get worse.  General care:  · Keep your skin clean by washing the area twice a day.  · Use the cream as directed until your rash is gone. Once the skin has healed, keep it dry to prevent another infection.   · If you are overweight, talk with your healthcare provider about a plan to lose excess weight.  · Avoid clothes that fit tightly.  Follow-up care  Follow up with your healthcare provider, or as advised. Your rash will clear in 7 to 14 days. Call your healthcare provider if the rash is not gone after 14 days.  When to seek medical advice  Call your healthcare provider right away if any of these occur:  · Pain or redness that gets worse or spreads  · Fluid coming from the skin  · Yellow crusts on the skin  · Fever of 100.4°F (38°C) or higher, or as directed by your healthcare provider  Date Last Reviewed: 9/1/2016  © 8610-1479 [email or web address]. [address] [place] [address], [place] [address]. All rights reserved. This information is not intended as a substitute for professional medical care. Always follow your healthcare professional's instructions.

## 2020-01-15 NOTE — PROGRESS NOTES
"Subjective:       Patient ID: Cristina Sprague is a 44 y.o. female.    Vitals:  height is 5' 6" (1.676 m) and weight is 145.2 kg (320 lb) (abnormal). Her oral temperature is 98.3 °F (36.8 °C). Her blood pressure is 128/72 and her pulse is 78. Her respiration is 16 and oxygen saturation is 98%.     Chief Complaint: Rash (BILAT LEGS/L HAND)    Ms. Sprague presents for evaluation of rash to bilateral lower extremities and a small amount on the left hand.  The rash is red and itchy.  The rash is in the skin folds of both of the legs posteriorly.   She has had rash for about 2 weeks and is worsening.  She denies any fevers, chills, drainage from the rash.  She has been using nystatin powder and gold Bond powder without relief.     Rash   This is a new problem. The problem is unchanged. The affected locations include the left hand, left lower leg and right lower leg. The rash is characterized by redness and itchiness. Pertinent negatives include no cough, fever or sore throat. Past treatments include anti-itch cream. The treatment provided no relief.       Constitution: Negative for chills and fever.   HENT: Negative for facial swelling and sore throat.    Neck: Negative for painful lymph nodes.   Eyes: Negative for eye itching and eyelid swelling.   Respiratory: Negative for cough.    Musculoskeletal: Negative for joint pain and joint swelling.   Skin: Positive for rash. Negative for color change, pale, wound, abrasion, laceration, lesion, skin thickening/induration, puncture wound, erythema, bruising, abscess, avulsion and hives.        BILAT POSTERIOR LOWER LEGS/L HAND   Allergic/Immunologic: Positive for itching. Negative for environmental allergies, immunocompromised state and hives.   Hematologic/Lymphatic: Negative for swollen lymph nodes.       Objective:      Physical Exam   Constitutional: She is oriented to person, place, and time. She appears well-developed and well-nourished.       Pt morbidly obese with " multiple skin folds on legs.  Beefy, erythematous, homogenous patches to posterior lower extremity skin folds.  Also with a few small erythematous papules to left dorsal hand.     HENT:   Head: Normocephalic and atraumatic. Head is without abrasion, without contusion and without laceration.   Right Ear: External ear normal.   Left Ear: External ear normal.   Nose: Nose normal.   Mouth/Throat: Oropharynx is clear and moist and mucous membranes are normal.   Eyes: Pupils are equal, round, and reactive to light. Conjunctivae, EOM and lids are normal.   Neck: Trachea normal, full passive range of motion without pain and phonation normal. Neck supple.   Cardiovascular: Normal rate, regular rhythm and normal heart sounds.   Pulmonary/Chest: Effort normal and breath sounds normal. No stridor. No respiratory distress.   Musculoskeletal: Normal range of motion.   Neurological: She is alert and oriented to person, place, and time.   Skin: Skin is warm, dry, intact and no rash. Capillary refill takes less than 2 seconds. abrasion, burn, bruising, erythema and ecchymosis  Psychiatric: She has a normal mood and affect. Her speech is normal and behavior is normal. Judgment and thought content normal. Cognition and memory are normal.   Nursing note and vitals reviewed.        Assessment:       1. Candidal intertrigo        Plan:         Candidal intertrigo    Other orders  -     ketoconazole (NIZORAL) 2 % cream; Apply topically once daily. for 14 days  Dispense: 60 g; Refill: 0  -     triamcinolone acetonide 0.1% (KENALOG) 0.1 % ointment; Apply topically 2 (two) times daily.  Dispense: 1 Tube; Refill: 0      Patient Instructions   PLEASE READ YOUR DISCHARGE INSTRUCTIONS ENTIRELY AS IT CONTAINS IMPORTANT INFORMATION.  - Rest.    - Drink plenty of fluids.    - Tylenol or Ibuprofen as directed as needed for fever/pain.    - If you were prescribed antibiotics, please take them to completion.  - If you are female and on birth control  pills - please use additional methods of contraception to prevent pregnancy while on antibiotics and for one cycle after.   - If you were prescribed a narcotic medication or muscle relaxer, do not drive or operate heavy equipment or machinery while taking these medications, as they can cause drowsiness.   - If you smoke, please stop smoking.  -You must understand that you've received an Urgent Care treatment only and that you may be released before all your medical problems are known or treated. You, the patient, will    arrange for follow up care as instructed. Please arrange follow up with your primary medical clinic as soon as possible.   - Follow up with your PCP or specialty clinic as directed in the next 1-2 weeks if not improved or as needed.  You can call [phone] to schedule an appointment with the appropriate provider.    - Please return to Urgent Care or to the Emergency Department if your symptoms worsen.    Patient aware and verbalized understanding.    Candida Skin Infection (Adult)  Candida is type of yeast. It grows naturally on the skin and in the mouth. If it grows out of control, it can cause an infection. Candida can cause infections in the genital area, skin folds, in the mouth, and under the breasts. Anyone can get this infection. It is more common in a person with a weak immune system, such as from diabetes, HIV, or cancer. Its also more common in someone who has been on antibiotic therapy. And its more common people who are overweight or who have incontinence. Wearing tight-fitting clothing and taking part in activities with lots of skin-to-skin contact can also put you at risk.  Candida causes the skin to become bright red and inflamed. The border of the infected part of the skin is often raised. The infection causes pain and itching. Sometimes the skin peels and bleeds. In the mouth, candida is called thrush, and may cause white thickened areas.  A Candida rash is most often treated  with an antifungal cream or ointment. The rash will clear a few days after starting the medicine. Infections that dont go away may need a prescription medicine. In rare cases, a bacterial infection can also occur.  Home care  Your healthcare provider will recommend an antifungal cream or ointment for the rash. He or she may also prescribe a medicine for the itch. Follow all instructions for using these medicines. Dont use cornstarch powder. Cornstarch can cause the Candida infection to get worse.  General care:  · Keep your skin clean by washing the area twice a day.  · Use the cream as directed until your rash is gone. Once the skin has healed, keep it dry to prevent another infection.   · If you are overweight, talk with your healthcare provider about a plan to lose excess weight.  · Avoid clothes that fit tightly.  Follow-up care  Follow up with your healthcare provider, or as advised. Your rash will clear in 7 to 14 days. Call your healthcare provider if the rash is not gone after 14 days.  When to seek medical advice  Call your healthcare provider right away if any of these occur:  · Pain or redness that gets worse or spreads  · Fluid coming from the skin  · Yellow crusts on the skin  · Fever of 100.4°F (38°C) or higher, or as directed by your healthcare provider  Date Last Reviewed: 9/1/2016  © 1080-2570 [employer]. [address] [place] [address], [place] [address]. All rights reserved. This information is not intended as a substitute for professional medical care. Always follow your healthcare professional's instructions.

## 2020-02-12 ENCOUNTER — OFFICE VISIT (OUTPATIENT)
Dept: URGENT CARE | Facility: CLINIC | Age: 45
End: 2020-02-12
Payer: MEDICAID

## 2020-02-12 VITALS
WEIGHT: 293 LBS | TEMPERATURE: 100 F | HEIGHT: 66 IN | SYSTOLIC BLOOD PRESSURE: 136 MMHG | OXYGEN SATURATION: 98 % | DIASTOLIC BLOOD PRESSURE: 88 MMHG | BODY MASS INDEX: 47.09 KG/M2 | RESPIRATION RATE: 16 BRPM | HEART RATE: 106 BPM

## 2020-02-12 DIAGNOSIS — R50.9 FEVER, UNSPECIFIED FEVER CAUSE: ICD-10-CM

## 2020-02-12 DIAGNOSIS — J10.1 INFLUENZA A: Primary | ICD-10-CM

## 2020-02-12 LAB
CTP QC/QA: YES
FLUAV AG NPH QL: POSITIVE
FLUBV AG NPH QL: NEGATIVE

## 2020-02-12 PROCEDURE — 99214 PR OFFICE/OUTPT VISIT, EST, LEVL IV, 30-39 MIN: ICD-10-PCS | Mod: 25,S$GLB,, | Performed by: PHYSICIAN ASSISTANT

## 2020-02-12 PROCEDURE — 87804 POCT INFLUENZA A/B: ICD-10-PCS | Mod: QW,S$GLB,, | Performed by: PHYSICIAN ASSISTANT

## 2020-02-12 PROCEDURE — 87804 INFLUENZA ASSAY W/OPTIC: CPT | Mod: QW,S$GLB,, | Performed by: PHYSICIAN ASSISTANT

## 2020-02-12 PROCEDURE — 99214 OFFICE O/P EST MOD 30 MIN: CPT | Mod: 25,S$GLB,, | Performed by: PHYSICIAN ASSISTANT

## 2020-02-12 RX ORDER — OSELTAMIVIR PHOSPHATE 75 MG/1
75 CAPSULE ORAL 2 TIMES DAILY
Qty: 10 CAPSULE | Refills: 0 | Status: SHIPPED | OUTPATIENT
Start: 2020-02-12 | End: 2020-02-17

## 2020-02-12 RX ORDER — ONDANSETRON 8 MG/1
8 TABLET, ORALLY DISINTEGRATING ORAL EVERY 6 HOURS PRN
Qty: 12 TABLET | Refills: 0 | Status: SHIPPED | OUTPATIENT
Start: 2020-02-12 | End: 2021-06-25

## 2020-02-12 NOTE — LETTER
February 12, 2020      [facility] [place]  [address]  [place] [address]  Phone: [phone]  Fax: [phone]       Patient: Cristina Sprague   YOB: 1975  Date of Visit: 02/12/2020    To Whom It May Concern:    Evy Sprague  was at [facility] on 02/12/2020. She may return to work/school on 02/17/2020 with no restrictions. If you have any questions or concerns, or if I can be of further assistance, please do not hesitate to contact me.    Sincerely,    Scarlet Cochran MA

## 2020-02-12 NOTE — PROGRESS NOTES
"Subjective:       Patient ID: Cristina Sprague is a 44 y.o. female.    Vitals:  height is 5' 6" (1.676 m) and weight is 145.2 kg (320 lb) (abnormal). Her oral temperature is 99.6 °F (37.6 °C). Her blood pressure is 136/88 and her pulse is 106. Her respiration is 16 and oxygen saturation is 98%.     Chief Complaint: Influenza    Influenza   This is a new problem. The current episode started yesterday. The problem occurs constantly. The problem has been unchanged. Associated symptoms include chills, coughing, fatigue, a fever, nausea and a sore throat. Pertinent negatives include no arthralgias, chest pain, congestion, headaches, joint swelling, myalgias, rash, vertigo, vomiting or weakness. She has tried nothing for the symptoms.       Constitution: Positive for chills, fatigue, fever and generalized weakness.   HENT: Positive for sore throat. Negative for congestion.    Neck: Negative for painful lymph nodes.   Cardiovascular: Negative for chest pain and leg swelling.   Eyes: Negative for double vision and blurred vision.   Respiratory: Positive for cough. Negative for shortness of breath.    Gastrointestinal: Positive for nausea. Negative for vomiting and diarrhea.   Genitourinary: Negative for dysuria, frequency, urgency and history of kidney stones.   Musculoskeletal: Negative for joint pain, joint swelling, muscle cramps and muscle ache.   Skin: Negative for color change, pale, rash and bruising.   Allergic/Immunologic: Negative for seasonal allergies.   Neurological: Negative for dizziness, history of vertigo, light-headedness, passing out and headaches.   Hematologic/Lymphatic: Negative for swollen lymph nodes.   Psychiatric/Behavioral: Negative for nervous/anxious, sleep disturbance and depression. The patient is not nervous/anxious.        Objective:      Physical Exam   Constitutional: She is oriented to person, place, and time. She appears well-developed and well-nourished. She is cooperative.  Non-toxic " appearance. She does not have a sickly appearance. She does not appear ill. No distress.   HENT:   Head: Normocephalic and atraumatic.   Right Ear: Hearing, tympanic membrane, external ear and ear canal normal.   Left Ear: Hearing, tympanic membrane, external ear and ear canal normal.   Nose: Rhinorrhea present. No mucosal edema, purulent discharge or nasal deformity. No epistaxis. Right sinus exhibits no maxillary sinus tenderness and no frontal sinus tenderness. Left sinus exhibits no maxillary sinus tenderness and no frontal sinus tenderness.   Mouth/Throat: Uvula is midline, oropharynx is clear and moist and mucous membranes are normal. No trismus in the jaw. Normal dentition. No uvula swelling. Cobblestoning present. No oropharyngeal exudate, posterior oropharyngeal edema, posterior oropharyngeal erythema or tonsillar abscesses. Tonsils are 1+ on the right. Tonsils are 1+ on the left. No tonsillar exudate.   Eyes: Conjunctivae and lids are normal. No scleral icterus.   Neck: Trachea normal, full passive range of motion without pain and phonation normal. Neck supple. No neck rigidity. No edema and no erythema present.   Cardiovascular: Normal rate, regular rhythm, normal heart sounds, intact distal pulses and normal pulses.   Pulmonary/Chest: Effort normal and breath sounds normal. No respiratory distress. She has no decreased breath sounds. She has no rhonchi.   Abdominal: Normal appearance.   Musculoskeletal: Normal range of motion. She exhibits no edema or deformity.   Lymphadenopathy:        Head (right side): No submental, no submandibular, no tonsillar, no preauricular, no posterior auricular and no occipital adenopathy present.        Head (left side): No submental, no submandibular, no tonsillar, no preauricular, no posterior auricular and no occipital adenopathy present.     She has no cervical adenopathy.        Right cervical: No superficial cervical, no deep cervical and no posterior cervical  adenopathy present.       Left cervical: No superficial cervical, no deep cervical and no posterior cervical adenopathy present.   Neurological: She is alert and oriented to person, place, and time. She exhibits normal muscle tone. Coordination normal.   Skin: Skin is warm, dry, intact, not diaphoretic and not pale.   Psychiatric: She has a normal mood and affect. Her speech is normal and behavior is normal. Judgment and thought content normal. Cognition and memory are normal.   Nursing note and vitals reviewed.        Assessment:       1. Influenza A    2. Fever, unspecified fever cause        Plan:         Influenza A  -     oseltamivir (TAMIFLU) 75 MG capsule; Take 1 capsule (75 mg total) by mouth 2 (two) times daily. for 5 days  Dispense: 10 capsule; Refill: 0  -     ondansetron (ZOFRAN-ODT) 8 MG TbDL; Take 1 tablet (8 mg total) by mouth every 6 (six) hours as needed.  Dispense: 12 tablet; Refill: 0    Fever, unspecified fever cause  -     POCT Influenza A/B     Review lab results with patient. Discussed diagnosis with patient as well as treatment and home care. Discussed return to clinic precautions vs ER precautions. All patients questions answered. Patient verbalized understanding. Patient agreed with plan of care.         The Flu (Influenza)     The virus that causes the flu spreads through the air in droplets when someone who has the flu coughs, sneezes, laughs, or talks.   The flu (influenza) is an infection that affects your respiratory tract. This tract is made up of your mouth, nose, and lungs, and the passages between them. Unlike a cold, the flu can make you very ill. And it can lead to pneumonia, a serious lung infection. The flu can have serious complications and even cause death.  Who is at risk for the flu?  Anyone can get the flu. But you are more likely to become infected if you:  · Have a weakened immune system  · Work in a healthcare setting where you may be exposed to flu germs  · Live or  work with someone who has the flu  · Havent had an annual flu shot  How does the flu spread?  The flu is caused by a virus. The virus spreads through the air in droplets when someone who has the flu coughs, sneezes, laughs, or talks. You can become infected when you inhale these viruses directly. You can also become infected when you touch a surface on which the droplets have landed and then transfer the germs to your eyes, nose, or mouth. Touching used tissues, or sharing utensils, drinking glasses, or a toothbrush from an infected person can expose you to flu viruses, too.  What are the symptoms of the flu?  Flu symptoms tend to come on quickly and may last a few days to a few weeks. They include:  · Fever usually higher than 100.4°F  (38°C) and chills  · Sore throat and headache  · Dry cough  · Runny nose  · Tiredness and weakness  · Muscle aches  Who is at risk for flu complications?  For some people, the flu can be very serious. The risk for complications is greater for:  · Children younger than age 5  · Adults ages 65 and older  · People with a chronic illness such as diabetes or heart, kidney, or lung disease  · People who live in a nursing home or long-term care facility   How is the flu treated?  The flu usually gets better after 7 days or so. In some cases, your healthcare provider may prescribe an antiviral medicine. This may help you get well a little sooner. For the medicine to help, you need to take it as soon as possible (ideally within 48 hours) after your symptoms start. If you develop pneumonia or other serious illness, you may need to stay in the hospital.  Easing flu symptoms  · Drink lots of fluids such as water, juice, and warm soup. A good rule is to drink enough so that you urinate your normal amount.  · Get plenty of rest.  · Ask your healthcare provider what to take for fever and pain.  · Call your provider if your fever is 100.4°F (38°C) or higher, or you become dizzy, lightheaded, or  short of breath.  Taking steps to protect others  · Wash your hands often, especially after coughing or sneezing. Or clean your hands with an alcohol-based hand  containing at least 60% alcohol.  · Cough or sneeze into a tissue. Then throw the tissue away and wash your hands. If you dont have a tissue, cough and sneeze into your elbow.  · Stay home until at least 24 hours after you no longer have a fever or chills. Be sure the fever isnt being hidden by fever-reducing medicine.  · Dont share food, utensils, drinking glasses, or a toothbrush with others.  · Ask your healthcare provider if others in your household should get antiviral medicine to help them avoid infection.  How can the flu be prevented?  · One of the best ways to avoid the flu is to get a flu vaccine each year. The virus that causes the flu changes from year to year. For that reason, healthcare providers recommend getting the flu vaccine each year, as soon as it's available in your area. The vaccine is given as a shot. Your healthcare provider can tell you which vaccine is right for you. A nasal spray is also available but is not recommended for the 5119-5271 flu season. The CDC says this is because the nasal spray did not seem to protect against the flu over the last several flu seasons. In the past, it was meant for people ages 2 to 49.  · Wash your hands often. Frequent handwashing is a proven way to help prevent infection.  · Carry an alcohol-based hand gel containing at least 60% alcohol. Use it when you can't use soap and water. Then wash your hands as soon as you can.  · Avoid touching your eyes, nose, and mouth.  · At home and work, clean phones, computer keyboards, and toys often with disinfectant wipes.  · If possible, avoid close contact with others who have the flu or symptoms of the flu.  Handwashing tips  Handwashing is one of the best ways to prevent many common infections. If you are caring for or visiting someone with the  flu, wash your hands each time you enter and leave the room. Follow these steps:  · Use warm water and plenty of soap. Rub your hands together well.  · Clean the whole hand, including under your nails, between your fingers, and up the wrists.  · Wash for at least 15 seconds.  · Rinse, letting the water run down your fingers, not up your wrists.  · Dry your hands well. Use a paper towel to turn off the faucet and open the door.  Using alcohol-based hand   Alcohol-based hand  are also a good choice. Use them when you can't use soap and water. Follow these steps:  · Squeeze about a tablespoon of gel into the palm of one hand.  · Rub your hands together briskly, cleaning the backs of your hands, the palms, between your fingers, and up the wrists.  · Rub until the gel is gone and your hands are completely dry.  Preventing the flu in healthcare settings  The flu is a special concern for people in hospitals and long-term care facilities. To help prevent the spread of flu, many hospitals and nursing homes take these steps:  · Healthcare providers wash their hands or use an alcohol-based hand  before and after treating each patient.  · People with the flu have private rooms and bathrooms or share a room with someone with the same infection.  · People who are at high risk for the flu but don't have it are encouraged to get the flu and pneumonia vaccines.  · All healthcare workers are encouraged or required to get flu shots.   Date Last Reviewed: 12/1/2016  © 8148-0939 [employer]. [address] [place] [address], [place] [address]. All rights reserved. This information is not intended as a substitute for professional medical care. Always follow your healthcare professional's instructions.      Patient Instructions   -Below are suggestions for symptomatic relief:              -Tylenol every 4 hours OR ibuprofen every 6 hours as needed for pain/fever.              -Salt water gargles to soothe throat  pain.              -Chloroseptic spray also helps to numb throat pain.              -Nasal saline spray reduces inflammation and dryness.              -Warm face compresses to help with facial sinus pain/pressure.              -Vicks vapor rub at night.              -Flonase OTC or Nasacort OTC for nasal congestion.              -Simple foods like chicken noodle soup.              -Delsym helps with coughing at night              -Zyrtec/Claritin during the day & Benadryl at night may help with allergies.                If you DO NOT have Hypertension or any history of palpitations, it is ok to take over the counter Sudafed or Mucinex D or Allegra-D or Claritin-D or Zyrtec-D.  If you do take one of the above, it is ok to combine that with plain over the counter Mucinex or Allegra or Claritin or Zyrtec. If, for example, you are taking Zyrtec -D, you can combine that with Mucinex, but not Mucinex-D.  If you are taking Mucinex-D, you can combine that with plain Allegra or Claritin or Zyrtec.   If you DO have Hypertension or palpitations, it is safe to take Coricidin HBP for relief of sinus symptoms.    Please follow up with your Primary care provider within 2-5 days if your signs and symptoms have not resolved or worsen.     If your condition worsens or fails to improve we recommend that you receive another evaluation at the emergency room immediately or contact your primary medical clinic to discuss your concerns.   You must understand that you have received an Urgent Care treatment only and that you may be released before all of your medical problems are known or treated. You, the patient, will arrange for follow up care as instructed.     RED FLAGS/WARNING SYMPTOMS DISCUSSED WITH PATIENT THAT WOULD WARRANT EMERGENT MEDICAL ATTENTION. PATIENT VERBALIZED UNDERSTANDING.

## 2020-02-12 NOTE — PATIENT INSTRUCTIONS
The Flu (Influenza)     The virus that causes the flu spreads through the air in droplets when someone who has the flu coughs, sneezes, laughs, or talks.   The flu (influenza) is an infection that affects your respiratory tract. This tract is made up of your mouth, nose, and lungs, and the passages between them. Unlike a cold, the flu can make you very ill. And it can lead to pneumonia, a serious lung infection. The flu can have serious complications and even cause death.  Who is at risk for the flu?  Anyone can get the flu. But you are more likely to become infected if you:  · Have a weakened immune system  · Work in a healthcare setting where you may be exposed to flu germs  · Live or work with someone who has the flu  · Havent had an annual flu shot  How does the flu spread?  The flu is caused by a virus. The virus spreads through the air in droplets when someone who has the flu coughs, sneezes, laughs, or talks. You can become infected when you inhale these viruses directly. You can also become infected when you touch a surface on which the droplets have landed and then transfer the germs to your eyes, nose, or mouth. Touching used tissues, or sharing utensils, drinking glasses, or a toothbrush from an infected person can expose you to flu viruses, too.  What are the symptoms of the flu?  Flu symptoms tend to come on quickly and may last a few days to a few weeks. They include:  · Fever usually higher than 100.4°F  (38°C) and chills  · Sore throat and headache  · Dry cough  · Runny nose  · Tiredness and weakness  · Muscle aches  Who is at risk for flu complications?  For some people, the flu can be very serious. The risk for complications is greater for:  · Children younger than age 5  · Adults ages 65 and older  · People with a chronic illness such as diabetes or heart, kidney, or lung disease  · People who live in a nursing home or long-term care facility   How is the flu treated?  The flu usually gets  better after 7 days or so. In some cases, your healthcare provider may prescribe an antiviral medicine. This may help you get well a little sooner. For the medicine to help, you need to take it as soon as possible (ideally within 48 hours) after your symptoms start. If you develop pneumonia or other serious illness, you may need to stay in the hospital.  Easing flu symptoms  · Drink lots of fluids such as water, juice, and warm soup. A good rule is to drink enough so that you urinate your normal amount.  · Get plenty of rest.  · Ask your healthcare provider what to take for fever and pain.  · Call your provider if your fever is 100.4°F (38°C) or higher, or you become dizzy, lightheaded, or short of breath.  Taking steps to protect others  · Wash your hands often, especially after coughing or sneezing. Or clean your hands with an alcohol-based hand  containing at least 60% alcohol.  · Cough or sneeze into a tissue. Then throw the tissue away and wash your hands. If you dont have a tissue, cough and sneeze into your elbow.  · Stay home until at least 24 hours after you no longer have a fever or chills. Be sure the fever isnt being hidden by fever-reducing medicine.  · Dont share food, utensils, drinking glasses, or a toothbrush with others.  · Ask your healthcare provider if others in your household should get antiviral medicine to help them avoid infection.  How can the flu be prevented?  · One of the best ways to avoid the flu is to get a flu vaccine each year. The virus that causes the flu changes from year to year. For that reason, healthcare providers recommend getting the flu vaccine each year, as soon as it's available in your area. The vaccine is given as a shot. Your healthcare provider can tell you which vaccine is right for you. A nasal spray is also available but is not recommended for the 0903-6866 flu season. The CDC says this is because the nasal spray did not seem to protect against the flu  over the last several flu seasons. In the past, it was meant for people ages 2 to 49.  · Wash your hands often. Frequent handwashing is a proven way to help prevent infection.  · Carry an alcohol-based hand gel containing at least 60% alcohol. Use it when you can't use soap and water. Then wash your hands as soon as you can.  · Avoid touching your eyes, nose, and mouth.  · At home and work, clean phones, computer keyboards, and toys often with disinfectant wipes.  · If possible, avoid close contact with others who have the flu or symptoms of the flu.  Handwashing tips  Handwashing is one of the best ways to prevent many common infections. If you are caring for or visiting someone with the flu, wash your hands each time you enter and leave the room. Follow these steps:  · Use warm water and plenty of soap. Rub your hands together well.  · Clean the whole hand, including under your nails, between your fingers, and up the wrists.  · Wash for at least 15 seconds.  · Rinse, letting the water run down your fingers, not up your wrists.  · Dry your hands well. Use a paper towel to turn off the faucet and open the door.  Using alcohol-based hand   Alcohol-based hand  are also a good choice. Use them when you can't use soap and water. Follow these steps:  · Squeeze about a tablespoon of gel into the palm of one hand.  · Rub your hands together briskly, cleaning the backs of your hands, the palms, between your fingers, and up the wrists.  · Rub until the gel is gone and your hands are completely dry.  Preventing the flu in healthcare settings  The flu is a special concern for people in hospitals and long-term care facilities. To help prevent the spread of flu, many hospitals and nursing homes take these steps:  · Healthcare providers wash their hands or use an alcohol-based hand  before and after treating each patient.  · People with the flu have private rooms and bathrooms or share a room with someone  with the same infection.  · People who are at high risk for the flu but don't have it are encouraged to get the flu and pneumonia vaccines.  · All healthcare workers are encouraged or required to get flu shots.   Date Last Reviewed: 12/1/2016  © 7784-7938 [employer]. [address] [place] [id number]. All rights reserved. This information is not intended as a substitute for professional medical care. Always follow your healthcare professional's instructions.      Patient Instructions   -Below are suggestions for symptomatic relief:              -Tylenol every 4 hours OR ibuprofen every 6 hours as needed for pain/fever.              -Salt water gargles to soothe throat pain.              -Chloroseptic spray also helps to numb throat pain.              -Nasal saline spray reduces inflammation and dryness.              -Warm face compresses to help with facial sinus pain/pressure.              -Vicks vapor rub at night.              -Flonase OTC or Nasacort OTC for nasal congestion.              -Simple foods like chicken noodle soup.              -Delsym helps with coughing at night              -Zyrtec/Claritin during the day & Benadryl at night may help with allergies.                If you DO NOT have Hypertension or any history of palpitations, it is ok to take over the counter Sudafed or Mucinex D or Allegra-D or Claritin-D or Zyrtec-D.  If you do take one of the above, it is ok to combine that with plain over the counter Mucinex or Allegra or Claritin or Zyrtec. If, for example, you are taking Zyrtec -D, you can combine that with Mucinex, but not Mucinex-D.  If you are taking Mucinex-D, you can combine that with plain Allegra or Claritin or Zyrtec.   If you DO have Hypertension or palpitations, it is safe to take Coricidin HBP for relief of sinus symptoms.    Please follow up with your Primary care provider within 2-5 days if your signs and symptoms have not resolved or worsen.     If your  condition worsens or fails to improve we recommend that you receive another evaluation at the emergency room immediately or contact your primary medical clinic to discuss your concerns.   You must understand that you have received an Urgent Care treatment only and that you may be released before all of your medical problems are known or treated. You, the patient, will arrange for follow up care as instructed.     RED FLAGS/WARNING SYMPTOMS DISCUSSED WITH PATIENT THAT WOULD WARRANT EMERGENT MEDICAL ATTENTION. PATIENT VERBALIZED UNDERSTANDING.

## 2020-04-10 RX ORDER — TRIAMCINOLONE ACETONIDE 1 MG/G
OINTMENT TOPICAL
Qty: 15 G | Refills: 0 | OUTPATIENT
Start: 2020-04-10

## 2020-04-10 RX ORDER — KETOCONAZOLE 20 MG/G
CREAM TOPICAL
Qty: 60 G | Refills: 0 | OUTPATIENT
Start: 2020-04-10

## 2020-10-13 ENCOUNTER — OFFICE VISIT (OUTPATIENT)
Dept: URGENT CARE | Facility: CLINIC | Age: 45
End: 2020-10-13
Payer: MEDICAID

## 2020-10-13 VITALS
OXYGEN SATURATION: 98 % | BODY MASS INDEX: 47.09 KG/M2 | DIASTOLIC BLOOD PRESSURE: 87 MMHG | SYSTOLIC BLOOD PRESSURE: 135 MMHG | HEIGHT: 66 IN | WEIGHT: 293 LBS | TEMPERATURE: 98 F | RESPIRATION RATE: 16 BRPM | HEART RATE: 64 BPM

## 2020-10-13 DIAGNOSIS — J30.1 SEASONAL ALLERGIC RHINITIS DUE TO POLLEN: Primary | ICD-10-CM

## 2020-10-13 PROCEDURE — 99213 OFFICE O/P EST LOW 20 MIN: CPT | Mod: S$GLB,,, | Performed by: FAMILY MEDICINE

## 2020-10-13 PROCEDURE — 99213 PR OFFICE/OUTPT VISIT, EST, LEVL III, 20-29 MIN: ICD-10-PCS | Mod: S$GLB,,, | Performed by: FAMILY MEDICINE

## 2020-10-13 RX ORDER — IBUPROFEN 600 MG/1
600 TABLET ORAL EVERY 6 HOURS PRN
Qty: 30 TABLET | Refills: 1 | Status: ON HOLD | OUTPATIENT
Start: 2020-10-13 | End: 2021-11-17 | Stop reason: HOSPADM

## 2020-10-13 RX ORDER — LORATADINE 10 MG/1
10 TABLET ORAL DAILY
Qty: 30 TABLET | Refills: 2 | Status: SHIPPED | OUTPATIENT
Start: 2020-10-13 | End: 2021-09-14

## 2020-10-13 NOTE — PROGRESS NOTES
"Subjective:       Patient ID: Cristina Sprague is a 45 y.o. female.    Vitals:  height is 5' 6" (1.676 m) and weight is 145.2 kg (320 lb) (abnormal). Her temperature is 98.2 °F (36.8 °C). Her blood pressure is 135/87 and her pulse is 64. Her respiration is 16 and oxygen saturation is 98%.     Chief Complaint: Sinus Problem    Pt states this morning she woke up with back side neck and head pain, fatigue, and scratchy throat. She denies cough or congestion. She did take Advil for mild relief, unsure if it's sinus tension headache or she slept wrong. The pain is gradually improving, this morning she could barely move her neck without pain.    Sinus Problem  This is a new problem. The current episode started today. The problem is unchanged. There has been no fever. Her pain is at a severity of 7/10. The pain is mild. Associated symptoms include headaches. Pertinent negatives include no chills, congestion, coughing, shortness of breath or sore throat. Treatments tried: Advil. The treatment provided mild relief.       Constitution: Negative for chills, fatigue and fever.   HENT: Positive for sinus pain. Negative for congestion and sore throat.    Neck: Negative for painful lymph nodes.   Cardiovascular: Negative for chest pain and leg swelling.   Eyes: Negative for double vision and blurred vision.   Respiratory: Negative for cough and shortness of breath.    Gastrointestinal: Negative for nausea, vomiting and diarrhea.   Genitourinary: Negative for dysuria, frequency, urgency and history of kidney stones.   Musculoskeletal: Negative for joint pain, joint swelling, muscle cramps and muscle ache.   Skin: Negative for color change, pale, rash and bruising.   Allergic/Immunologic: Negative for seasonal allergies.   Neurological: Positive for headaches. Negative for dizziness, history of vertigo, light-headedness and passing out.   Hematologic/Lymphatic: Negative for swollen lymph nodes.   Psychiatric/Behavioral: Negative for " nervous/anxious, sleep disturbance and depression. The patient is not nervous/anxious.        Objective:      Physical Exam   Constitutional: She is oriented to person, place, and time. She appears well-developed. She is cooperative.  Non-toxic appearance. She does not appear ill. No distress.   HENT:   Head: Normocephalic and atraumatic.   Ears:   Right Ear: Hearing, tympanic membrane, external ear and ear canal normal.   Left Ear: Hearing, tympanic membrane, external ear and ear canal normal.   Nose: Nose normal. No mucosal edema, rhinorrhea or nasal deformity. No epistaxis. Right sinus exhibits no maxillary sinus tenderness and no frontal sinus tenderness. Left sinus exhibits no maxillary sinus tenderness and no frontal sinus tenderness.      Comments: Throat is clear TMs with fluid right more than left no lymphadenopathy  Mouth/Throat: Uvula is midline, oropharynx is clear and moist and mucous membranes are normal. No trismus in the jaw. Normal dentition. No uvula swelling. No posterior oropharyngeal erythema.   Eyes: Conjunctivae and lids are normal. Right eye exhibits no discharge. Left eye exhibits no discharge. No scleral icterus.   Neck: Trachea normal, normal range of motion, full passive range of motion without pain and phonation normal. Neck supple.   Cardiovascular: Normal rate, regular rhythm, normal heart sounds and normal pulses.   Pulmonary/Chest: Effort normal and breath sounds normal. No respiratory distress.   Abdominal: Soft. Normal appearance and bowel sounds are normal. She exhibits no distension, no pulsatile midline mass and no mass. There is no abdominal tenderness.   Musculoskeletal: Normal range of motion.         General: No deformity.   Neurological: She is alert and oriented to person, place, and time. She exhibits normal muscle tone. Coordination normal.   Skin: Skin is warm, dry, intact, not diaphoretic and not pale. Psychiatric: Her speech is normal and behavior is normal. Judgment  and thought content normal.   Nursing note and vitals reviewed.        Assessment:       1. Seasonal allergic rhinitis due to pollen        Plan:         Seasonal allergic rhinitis due to pollen    Other orders  -     loratadine (CLARITIN) 10 mg tablet; Take 1 tablet (10 mg total) by mouth once daily.  Dispense: 30 tablet; Refill: 2  -     ibuprofen (ADVIL,MOTRIN) 600 MG tablet; Take 1 tablet (600 mg total) by mouth every 6 (six) hours as needed for Pain.  Dispense: 30 tablet; Refill: 1

## 2021-06-13 ENCOUNTER — OFFICE VISIT (OUTPATIENT)
Dept: URGENT CARE | Facility: CLINIC | Age: 46
End: 2021-06-13
Payer: MEDICAID

## 2021-06-13 VITALS
OXYGEN SATURATION: 98 % | TEMPERATURE: 98 F | RESPIRATION RATE: 16 BRPM | DIASTOLIC BLOOD PRESSURE: 70 MMHG | SYSTOLIC BLOOD PRESSURE: 119 MMHG | BODY MASS INDEX: 42.75 KG/M2 | HEIGHT: 66 IN | WEIGHT: 266 LBS | HEART RATE: 50 BPM

## 2021-06-13 DIAGNOSIS — Z11.52 ENCOUNTER FOR SCREENING FOR COVID-19: Primary | ICD-10-CM

## 2021-06-13 DIAGNOSIS — J30.1 SEASONAL ALLERGIC RHINITIS DUE TO POLLEN: ICD-10-CM

## 2021-06-13 LAB
CTP QC/QA: YES
SARS-COV-2 RDRP RESP QL NAA+PROBE: NEGATIVE

## 2021-06-13 PROCEDURE — 99213 OFFICE O/P EST LOW 20 MIN: CPT | Mod: S$GLB,,, | Performed by: FAMILY MEDICINE

## 2021-06-13 PROCEDURE — U0002 COVID-19 LAB TEST NON-CDC: HCPCS | Mod: QW,S$GLB,, | Performed by: FAMILY MEDICINE

## 2021-06-13 PROCEDURE — U0002: ICD-10-PCS | Mod: QW,S$GLB,, | Performed by: FAMILY MEDICINE

## 2021-06-13 PROCEDURE — 99213 PR OFFICE/OUTPT VISIT, EST, LEVL III, 20-29 MIN: ICD-10-PCS | Mod: S$GLB,,, | Performed by: FAMILY MEDICINE

## 2021-06-13 RX ORDER — LORATADINE 10 MG/1
10 TABLET ORAL DAILY
Qty: 30 TABLET | Refills: 2 | Status: SHIPPED | OUTPATIENT
Start: 2021-06-13 | End: 2021-09-14

## 2021-06-13 RX ORDER — GUAIFENESIN 600 MG/1
600 TABLET, EXTENDED RELEASE ORAL 2 TIMES DAILY
Qty: 14 TABLET | Refills: 2 | Status: SHIPPED | OUTPATIENT
Start: 2021-06-13 | End: 2021-06-20

## 2021-06-13 RX ORDER — FLUTICASONE PROPIONATE 50 MCG
1 SPRAY, SUSPENSION (ML) NASAL DAILY
Qty: 18 G | Refills: 3 | Status: SHIPPED | OUTPATIENT
Start: 2021-06-13 | End: 2021-12-16

## 2021-06-21 ENCOUNTER — TELEPHONE (OUTPATIENT)
Dept: ORTHOPEDICS | Facility: CLINIC | Age: 46
End: 2021-06-21

## 2021-06-21 DIAGNOSIS — M25.561 PAIN IN BOTH KNEES, UNSPECIFIED CHRONICITY: Primary | ICD-10-CM

## 2021-06-21 DIAGNOSIS — M25.562 PAIN IN BOTH KNEES, UNSPECIFIED CHRONICITY: Primary | ICD-10-CM

## 2021-06-22 ENCOUNTER — TELEPHONE (OUTPATIENT)
Dept: ORTHOPEDICS | Facility: CLINIC | Age: 46
End: 2021-06-22

## 2021-06-25 ENCOUNTER — OFFICE VISIT (OUTPATIENT)
Dept: ORTHOPEDICS | Facility: CLINIC | Age: 46
End: 2021-06-25
Payer: MEDICAID

## 2021-06-25 VITALS
HEART RATE: 70 BPM | SYSTOLIC BLOOD PRESSURE: 106 MMHG | BODY MASS INDEX: 43.59 KG/M2 | DIASTOLIC BLOOD PRESSURE: 74 MMHG | RESPIRATION RATE: 18 BRPM | WEIGHT: 266 LBS

## 2021-06-25 DIAGNOSIS — M17.12 PRIMARY OSTEOARTHRITIS OF LEFT KNEE: ICD-10-CM

## 2021-06-25 DIAGNOSIS — E66.9 OBESITY, UNSPECIFIED CLASSIFICATION, UNSPECIFIED OBESITY TYPE, UNSPECIFIED WHETHER SERIOUS COMORBIDITY PRESENT: ICD-10-CM

## 2021-06-25 DIAGNOSIS — M17.11 PRIMARY OSTEOARTHRITIS OF RIGHT KNEE: Primary | ICD-10-CM

## 2021-06-25 PROCEDURE — 99213 OFFICE O/P EST LOW 20 MIN: CPT | Mod: PBBFAC,PN | Performed by: PHYSICIAN ASSISTANT

## 2021-06-25 PROCEDURE — 99999 PR PBB SHADOW E&M-EST. PATIENT-LVL III: CPT | Mod: PBBFAC,,, | Performed by: PHYSICIAN ASSISTANT

## 2021-06-25 PROCEDURE — 99999 PR PBB SHADOW E&M-EST. PATIENT-LVL III: ICD-10-PCS | Mod: PBBFAC,,, | Performed by: PHYSICIAN ASSISTANT

## 2021-06-25 PROCEDURE — 99203 OFFICE O/P NEW LOW 30 MIN: CPT | Mod: S$PBB,,, | Performed by: PHYSICIAN ASSISTANT

## 2021-06-25 PROCEDURE — 99203 PR OFFICE/OUTPT VISIT, NEW, LEVL III, 30-44 MIN: ICD-10-PCS | Mod: S$PBB,,, | Performed by: PHYSICIAN ASSISTANT

## 2021-07-01 ENCOUNTER — PATIENT MESSAGE (OUTPATIENT)
Dept: ORTHOPEDICS | Facility: CLINIC | Age: 46
End: 2021-07-01

## 2021-07-06 ENCOUNTER — OFFICE VISIT (OUTPATIENT)
Dept: ORTHOPEDICS | Facility: CLINIC | Age: 46
End: 2021-07-06
Payer: MEDICAID

## 2021-07-06 VITALS — WEIGHT: 259.19 LBS | BODY MASS INDEX: 43.18 KG/M2 | HEIGHT: 65 IN

## 2021-07-06 DIAGNOSIS — M17.11 PRIMARY OSTEOARTHRITIS OF RIGHT KNEE: Primary | ICD-10-CM

## 2021-07-06 PROCEDURE — 99999 PR PBB SHADOW E&M-EST. PATIENT-LVL III: CPT | Mod: PBBFAC,,, | Performed by: ORTHOPAEDIC SURGERY

## 2021-07-06 PROCEDURE — 99999 PR PBB SHADOW E&M-EST. PATIENT-LVL III: ICD-10-PCS | Mod: PBBFAC,,, | Performed by: ORTHOPAEDIC SURGERY

## 2021-07-06 PROCEDURE — 99214 OFFICE O/P EST MOD 30 MIN: CPT | Mod: S$PBB,,, | Performed by: ORTHOPAEDIC SURGERY

## 2021-07-06 PROCEDURE — 99213 OFFICE O/P EST LOW 20 MIN: CPT | Mod: PBBFAC,PN | Performed by: ORTHOPAEDIC SURGERY

## 2021-07-06 PROCEDURE — 99214 PR OFFICE/OUTPT VISIT, EST, LEVL IV, 30-39 MIN: ICD-10-PCS | Mod: S$PBB,,, | Performed by: ORTHOPAEDIC SURGERY

## 2021-08-09 ENCOUNTER — OFFICE VISIT (OUTPATIENT)
Dept: URGENT CARE | Facility: CLINIC | Age: 46
End: 2021-08-09
Payer: MEDICAID

## 2021-08-09 VITALS
DIASTOLIC BLOOD PRESSURE: 79 MMHG | WEIGHT: 259 LBS | SYSTOLIC BLOOD PRESSURE: 109 MMHG | HEIGHT: 65 IN | RESPIRATION RATE: 16 BRPM | TEMPERATURE: 99 F | BODY MASS INDEX: 43.15 KG/M2 | HEART RATE: 100 BPM

## 2021-08-09 DIAGNOSIS — U07.1 COVID-19: Primary | ICD-10-CM

## 2021-08-09 LAB
CTP QC/QA: YES
SARS-COV-2 RDRP RESP QL NAA+PROBE: POSITIVE

## 2021-08-09 PROCEDURE — U0002 COVID-19 LAB TEST NON-CDC: HCPCS | Mod: QW,S$GLB,, | Performed by: PHYSICIAN ASSISTANT

## 2021-08-09 PROCEDURE — U0002: ICD-10-PCS | Mod: QW,S$GLB,, | Performed by: PHYSICIAN ASSISTANT

## 2021-08-09 PROCEDURE — 99214 PR OFFICE/OUTPT VISIT, EST, LEVL IV, 30-39 MIN: ICD-10-PCS | Mod: S$GLB,,, | Performed by: PHYSICIAN ASSISTANT

## 2021-08-09 PROCEDURE — 99214 OFFICE O/P EST MOD 30 MIN: CPT | Mod: S$GLB,,, | Performed by: PHYSICIAN ASSISTANT

## 2021-09-14 ENCOUNTER — PATIENT MESSAGE (OUTPATIENT)
Dept: ORTHOPEDICS | Facility: CLINIC | Age: 46
End: 2021-09-14

## 2021-09-14 ENCOUNTER — OFFICE VISIT (OUTPATIENT)
Dept: ORTHOPEDICS | Facility: CLINIC | Age: 46
End: 2021-09-14
Payer: MEDICAID

## 2021-09-14 ENCOUNTER — TELEPHONE (OUTPATIENT)
Dept: ORTHOPEDICS | Facility: CLINIC | Age: 46
End: 2021-09-14

## 2021-09-14 VITALS — BODY MASS INDEX: 43.15 KG/M2 | HEIGHT: 65 IN | WEIGHT: 259 LBS

## 2021-09-14 DIAGNOSIS — M17.11 PRIMARY OSTEOARTHRITIS OF RIGHT KNEE: Primary | ICD-10-CM

## 2021-09-14 DIAGNOSIS — Z96.651 S/P TOTAL KNEE ARTHROPLASTY, RIGHT: ICD-10-CM

## 2021-09-14 DIAGNOSIS — Z41.9 SURGERY, ELECTIVE: Primary | ICD-10-CM

## 2021-09-14 PROCEDURE — 99999 PR PBB SHADOW E&M-EST. PATIENT-LVL II: ICD-10-PCS | Mod: PBBFAC,,, | Performed by: ORTHOPAEDIC SURGERY

## 2021-09-14 PROCEDURE — 99214 PR OFFICE/OUTPT VISIT, EST, LEVL IV, 30-39 MIN: ICD-10-PCS | Mod: S$PBB,,, | Performed by: ORTHOPAEDIC SURGERY

## 2021-09-14 PROCEDURE — 99214 OFFICE O/P EST MOD 30 MIN: CPT | Mod: S$PBB,,, | Performed by: ORTHOPAEDIC SURGERY

## 2021-09-14 PROCEDURE — 99999 PR PBB SHADOW E&M-EST. PATIENT-LVL II: CPT | Mod: PBBFAC,,, | Performed by: ORTHOPAEDIC SURGERY

## 2021-09-14 PROCEDURE — 99212 OFFICE O/P EST SF 10 MIN: CPT | Mod: PBBFAC,PN | Performed by: ORTHOPAEDIC SURGERY

## 2021-09-15 ENCOUNTER — TELEPHONE (OUTPATIENT)
Dept: ORTHOPEDICS | Facility: CLINIC | Age: 46
End: 2021-09-15

## 2021-09-23 DIAGNOSIS — M17.11 PRIMARY OSTEOARTHRITIS OF RIGHT KNEE: Primary | ICD-10-CM

## 2021-09-23 RX ORDER — NAPROXEN 250 MG/1
500 TABLET ORAL
Status: CANCELLED | OUTPATIENT
Start: 2021-09-23 | End: 2021-09-23

## 2021-09-23 RX ORDER — SODIUM CHLORIDE 0.9 % (FLUSH) 0.9 %
3 SYRINGE (ML) INJECTION EVERY 8 HOURS
Status: CANCELLED | OUTPATIENT
Start: 2021-09-23

## 2021-09-23 RX ORDER — MUPIROCIN 20 MG/G
OINTMENT TOPICAL
Status: CANCELLED | OUTPATIENT
Start: 2021-09-23

## 2021-09-23 RX ORDER — ACETAMINOPHEN 325 MG/1
1000 TABLET ORAL
Status: CANCELLED | OUTPATIENT
Start: 2021-09-23 | End: 2021-09-23

## 2021-09-23 RX ORDER — PREGABALIN 50 MG/1
150 CAPSULE ORAL
Status: CANCELLED | OUTPATIENT
Start: 2021-09-23 | End: 2021-09-23

## 2021-10-27 ENCOUNTER — OFFICE VISIT (OUTPATIENT)
Dept: OBSTETRICS AND GYNECOLOGY | Facility: CLINIC | Age: 46
End: 2021-10-27
Payer: MEDICAID

## 2021-10-27 VITALS
HEIGHT: 65 IN | WEIGHT: 240.94 LBS | BODY MASS INDEX: 40.14 KG/M2 | DIASTOLIC BLOOD PRESSURE: 72 MMHG | SYSTOLIC BLOOD PRESSURE: 110 MMHG

## 2021-10-27 DIAGNOSIS — Z01.419 ENCOUNTER FOR ANNUAL ROUTINE GYNECOLOGICAL EXAMINATION: ICD-10-CM

## 2021-10-27 DIAGNOSIS — Z12.31 BREAST CANCER SCREENING BY MAMMOGRAM: ICD-10-CM

## 2021-10-27 DIAGNOSIS — U07.1 COVID-19: ICD-10-CM

## 2021-10-27 DIAGNOSIS — N92.6 PROLONGED PERIODS: Primary | ICD-10-CM

## 2021-10-27 DIAGNOSIS — N93.9 ABNORMAL UTERINE BLEEDING (AUB): ICD-10-CM

## 2021-10-27 PROBLEM — F32.A DEPRESSIVE DISORDER: Status: ACTIVE | Noted: 2021-10-27

## 2021-10-27 PROBLEM — Z11.59 SCREENING FOR OTHER SPECIFIC VIRAL AND CHLAMYDIAL DISEASES: Status: ACTIVE | Noted: 2020-12-09

## 2021-10-27 PROBLEM — M17.9 OSTEOARTHRITIS OF KNEE: Status: ACTIVE | Noted: 2021-10-27

## 2021-10-27 PROBLEM — Z86.718 HISTORY OF DEEP VENOUS THROMBOSIS: Status: ACTIVE | Noted: 2021-10-27

## 2021-10-27 PROBLEM — F41.9 ANXIETY DISORDER, UNSPECIFIED: Status: ACTIVE | Noted: 2021-10-27

## 2021-10-27 PROBLEM — Z86.711 HISTORY OF PULMONARY EMBOLISM: Status: ACTIVE | Noted: 2021-10-27

## 2021-10-27 PROBLEM — S83.419A SPRAIN OF MCL (MEDIAL COLLATERAL LIGAMENT) OF KNEE: Status: ACTIVE | Noted: 2021-10-27

## 2021-10-27 PROBLEM — M23.50 CHRONIC INSTABILITY OF KNEE: Status: ACTIVE | Noted: 2021-10-27

## 2021-10-27 PROBLEM — Z11.8 SCREENING FOR OTHER SPECIFIC VIRAL AND CHLAMYDIAL DISEASES: Status: ACTIVE | Noted: 2020-12-09

## 2021-10-27 PROBLEM — M54.16 LUMBAR RADICULOPATHY: Status: ACTIVE | Noted: 2021-10-27

## 2021-10-27 PROBLEM — Z20.828 CONTACT WITH AND (SUSPECTED) EXPOSURE TO OTHER VIRAL COMMUNICABLE DISEASES: Status: ACTIVE | Noted: 2020-12-11

## 2021-10-27 PROBLEM — M25.569 KNEE PAIN: Status: ACTIVE | Noted: 2021-10-27

## 2021-10-27 LAB
B-HCG UR QL: NEGATIVE
CTP QC/QA: YES

## 2021-10-27 PROCEDURE — 87624 HPV HI-RISK TYP POOLED RSLT: CPT | Performed by: OBSTETRICS & GYNECOLOGY

## 2021-10-27 PROCEDURE — 99386 PREV VISIT NEW AGE 40-64: CPT | Mod: S$GLB,,, | Performed by: OBSTETRICS & GYNECOLOGY

## 2021-10-27 PROCEDURE — 81025 URINE PREGNANCY TEST: CPT | Mod: S$GLB,,, | Performed by: OBSTETRICS & GYNECOLOGY

## 2021-10-27 PROCEDURE — 81025 POCT URINE PREGNANCY: ICD-10-PCS | Mod: S$GLB,,, | Performed by: OBSTETRICS & GYNECOLOGY

## 2021-10-27 PROCEDURE — 88175 CYTOPATH C/V AUTO FLUID REDO: CPT | Performed by: OBSTETRICS & GYNECOLOGY

## 2021-10-27 PROCEDURE — 99386 PR PREVENTIVE VISIT,NEW,40-64: ICD-10-PCS | Mod: S$GLB,,, | Performed by: OBSTETRICS & GYNECOLOGY

## 2021-10-27 RX ORDER — FLUTICASONE PROPIONATE 50 MCG
SPRAY, SUSPENSION (ML) NASAL
COMMUNITY
Start: 2021-09-30 | End: 2021-12-03

## 2021-10-28 ENCOUNTER — PATIENT MESSAGE (OUTPATIENT)
Dept: ORTHOPEDICS | Facility: CLINIC | Age: 46
End: 2021-10-28

## 2021-10-28 ENCOUNTER — OFFICE VISIT (OUTPATIENT)
Dept: ORTHOPEDICS | Facility: CLINIC | Age: 46
End: 2021-10-28
Payer: MEDICAID

## 2021-10-28 VITALS
HEART RATE: 48 BPM | SYSTOLIC BLOOD PRESSURE: 119 MMHG | HEIGHT: 65 IN | WEIGHT: 240.94 LBS | DIASTOLIC BLOOD PRESSURE: 76 MMHG | BODY MASS INDEX: 40.14 KG/M2

## 2021-10-28 DIAGNOSIS — M17.11 PRIMARY OSTEOARTHRITIS OF RIGHT KNEE: Primary | ICD-10-CM

## 2021-10-28 DIAGNOSIS — Z01.818 PRE-OP EXAM: ICD-10-CM

## 2021-10-28 PROCEDURE — 99999 PR PBB SHADOW E&M-EST. PATIENT-LVL III: ICD-10-PCS | Mod: PBBFAC,,, | Performed by: ORTHOPAEDIC SURGERY

## 2021-10-28 PROCEDURE — 99999 PR PBB SHADOW E&M-EST. PATIENT-LVL III: CPT | Mod: PBBFAC,,, | Performed by: ORTHOPAEDIC SURGERY

## 2021-10-28 PROCEDURE — 99499 UNLISTED E&M SERVICE: CPT | Mod: S$PBB,,, | Performed by: ORTHOPAEDIC SURGERY

## 2021-10-28 PROCEDURE — 99213 OFFICE O/P EST LOW 20 MIN: CPT | Mod: PBBFAC,PN | Performed by: ORTHOPAEDIC SURGERY

## 2021-10-28 PROCEDURE — 99499 NO LOS: ICD-10-PCS | Mod: S$PBB,,, | Performed by: ORTHOPAEDIC SURGERY

## 2021-11-02 LAB
FINAL PATHOLOGIC DIAGNOSIS: NORMAL
Lab: NORMAL

## 2021-11-03 ENCOUNTER — ANESTHESIA EVENT (OUTPATIENT)
Dept: SURGERY | Facility: HOSPITAL | Age: 46
End: 2021-11-03
Payer: MEDICAID

## 2021-11-03 ENCOUNTER — CLINICAL SUPPORT (OUTPATIENT)
Dept: LAB | Facility: HOSPITAL | Age: 46
End: 2021-11-03
Attending: ORTHOPAEDIC SURGERY
Payer: MEDICAID

## 2021-11-03 ENCOUNTER — HOSPITAL ENCOUNTER (OUTPATIENT)
Dept: PREADMISSION TESTING | Facility: HOSPITAL | Age: 46
Discharge: HOME | End: 2021-11-03
Attending: ORTHOPAEDIC SURGERY
Payer: MEDICAID

## 2021-11-03 VITALS — SYSTOLIC BLOOD PRESSURE: 126 MMHG | DIASTOLIC BLOOD PRESSURE: 65 MMHG | OXYGEN SATURATION: 100 % | HEART RATE: 46 BPM

## 2021-11-03 DIAGNOSIS — Z01.818 PREOP TESTING: Primary | ICD-10-CM

## 2021-11-03 DIAGNOSIS — Z01.818 PREOP TESTING: ICD-10-CM

## 2021-11-03 PROCEDURE — 93010 ELECTROCARDIOGRAM REPORT: CPT | Mod: ,,, | Performed by: INTERNAL MEDICINE

## 2021-11-03 PROCEDURE — 93005 ELECTROCARDIOGRAM TRACING: CPT

## 2021-11-03 PROCEDURE — 93010 EKG 12-LEAD: ICD-10-PCS | Mod: ,,, | Performed by: INTERNAL MEDICINE

## 2021-11-03 RX ORDER — LIDOCAINE HYDROCHLORIDE 10 MG/ML
1 INJECTION, SOLUTION EPIDURAL; INFILTRATION; INTRACAUDAL; PERINEURAL ONCE
Status: CANCELLED | OUTPATIENT
Start: 2021-11-03 | End: 2021-11-03

## 2021-11-03 RX ORDER — SODIUM CHLORIDE, SODIUM LACTATE, POTASSIUM CHLORIDE, CALCIUM CHLORIDE 600; 310; 30; 20 MG/100ML; MG/100ML; MG/100ML; MG/100ML
INJECTION, SOLUTION INTRAVENOUS CONTINUOUS
Status: CANCELLED | OUTPATIENT
Start: 2021-11-03

## 2021-11-03 RX ORDER — SCOLOPAMINE TRANSDERMAL SYSTEM 1 MG/1
1 PATCH, EXTENDED RELEASE TRANSDERMAL
Status: CANCELLED | OUTPATIENT
Start: 2021-11-03

## 2021-11-05 LAB
HPV HR 12 DNA SPEC QL NAA+PROBE: NEGATIVE
HPV16 AG SPEC QL: NEGATIVE
HPV18 DNA SPEC QL NAA+PROBE: NEGATIVE

## 2021-11-09 ENCOUNTER — PATIENT MESSAGE (OUTPATIENT)
Dept: ORTHOPEDICS | Facility: CLINIC | Age: 46
End: 2021-11-09
Payer: MEDICAID

## 2021-11-17 ENCOUNTER — PATIENT MESSAGE (OUTPATIENT)
Dept: ORTHOPEDICS | Facility: CLINIC | Age: 46
End: 2021-11-17
Payer: MEDICAID

## 2021-11-17 ENCOUNTER — ANESTHESIA (OUTPATIENT)
Dept: SURGERY | Facility: HOSPITAL | Age: 46
End: 2021-11-17
Payer: MEDICAID

## 2021-11-17 ENCOUNTER — HOSPITAL ENCOUNTER (OUTPATIENT)
Facility: HOSPITAL | Age: 46
Discharge: HOME HEALTH | End: 2021-11-17
Attending: ORTHOPAEDIC SURGERY | Admitting: ORTHOPAEDIC SURGERY
Payer: MEDICAID

## 2021-11-17 VITALS
HEART RATE: 62 BPM | SYSTOLIC BLOOD PRESSURE: 147 MMHG | WEIGHT: 234.38 LBS | BODY MASS INDEX: 37.67 KG/M2 | RESPIRATION RATE: 16 BRPM | OXYGEN SATURATION: 97 % | DIASTOLIC BLOOD PRESSURE: 63 MMHG | TEMPERATURE: 98 F | HEIGHT: 66 IN

## 2021-11-17 DIAGNOSIS — M17.11 PRIMARY OSTEOARTHRITIS OF RIGHT KNEE: ICD-10-CM

## 2021-11-17 DIAGNOSIS — Z96.651 HISTORY OF RIGHT KNEE JOINT REPLACEMENT: Primary | ICD-10-CM

## 2021-11-17 LAB
B-HCG UR QL: NEGATIVE
CTP QC/QA: YES

## 2021-11-17 PROCEDURE — 25000003 PHARM REV CODE 250: Performed by: ORTHOPAEDIC SURGERY

## 2021-11-17 PROCEDURE — C1776 JOINT DEVICE (IMPLANTABLE): HCPCS | Performed by: ORTHOPAEDIC SURGERY

## 2021-11-17 PROCEDURE — 27201423 OPTIME MED/SURG SUP & DEVICES STERILE SUPPLY: Performed by: ORTHOPAEDIC SURGERY

## 2021-11-17 PROCEDURE — 97535 SELF CARE MNGMENT TRAINING: CPT

## 2021-11-17 PROCEDURE — 71000015 HC POSTOP RECOV 1ST HR: Performed by: ORTHOPAEDIC SURGERY

## 2021-11-17 PROCEDURE — 36000710: Performed by: ORTHOPAEDIC SURGERY

## 2021-11-17 PROCEDURE — 36000711: Performed by: ORTHOPAEDIC SURGERY

## 2021-11-17 PROCEDURE — 64447 NJX AA&/STRD FEMORAL NRV IMG: CPT | Performed by: STUDENT IN AN ORGANIZED HEALTH CARE EDUCATION/TRAINING PROGRAM

## 2021-11-17 PROCEDURE — 97116 GAIT TRAINING THERAPY: CPT

## 2021-11-17 PROCEDURE — 01402 ANES OPN/ARTH TOT KNE ARTHRP: CPT | Performed by: ORTHOPAEDIC SURGERY

## 2021-11-17 PROCEDURE — 27447 TOTAL KNEE ARTHROPLASTY: CPT | Mod: RT,,, | Performed by: ORTHOPAEDIC SURGERY

## 2021-11-17 PROCEDURE — 37000008 HC ANESTHESIA 1ST 15 MINUTES: Performed by: ORTHOPAEDIC SURGERY

## 2021-11-17 PROCEDURE — 37000009 HC ANESTHESIA EA ADD 15 MINS: Performed by: ORTHOPAEDIC SURGERY

## 2021-11-17 PROCEDURE — 27447 PR TOTAL KNEE ARTHROPLASTY: ICD-10-PCS | Mod: RT,,, | Performed by: ORTHOPAEDIC SURGERY

## 2021-11-17 PROCEDURE — 97165 OT EVAL LOW COMPLEX 30 MIN: CPT

## 2021-11-17 PROCEDURE — 25000003 PHARM REV CODE 250: Performed by: NURSE PRACTITIONER

## 2021-11-17 PROCEDURE — 71000033 HC RECOVERY, INTIAL HOUR: Performed by: ORTHOPAEDIC SURGERY

## 2021-11-17 PROCEDURE — 63600175 PHARM REV CODE 636 W HCPCS: Performed by: NURSE PRACTITIONER

## 2021-11-17 PROCEDURE — C1713 ANCHOR/SCREW BN/BN,TIS/BN: HCPCS | Performed by: ORTHOPAEDIC SURGERY

## 2021-11-17 PROCEDURE — 27200665 HC NERVE BLOCK NEEDLE/ CATHETER: Performed by: ANESTHESIOLOGY

## 2021-11-17 PROCEDURE — 71000016 HC POSTOP RECOV ADDL HR: Performed by: ORTHOPAEDIC SURGERY

## 2021-11-17 PROCEDURE — 63600175 PHARM REV CODE 636 W HCPCS: Performed by: STUDENT IN AN ORGANIZED HEALTH CARE EDUCATION/TRAINING PROGRAM

## 2021-11-17 PROCEDURE — 25000003 PHARM REV CODE 250: Performed by: ANESTHESIOLOGY

## 2021-11-17 PROCEDURE — 25000003 PHARM REV CODE 250: Performed by: STUDENT IN AN ORGANIZED HEALTH CARE EDUCATION/TRAINING PROGRAM

## 2021-11-17 PROCEDURE — 97161 PT EVAL LOW COMPLEX 20 MIN: CPT

## 2021-11-17 DEVICE — INSERT PFC SIGMA TIB SZ3 10MM: Type: IMPLANTABLE DEVICE | Site: KNEE | Status: FUNCTIONAL

## 2021-11-17 DEVICE — PATELLA OVAL 38MM: Type: IMPLANTABLE DEVICE | Site: KNEE | Status: FUNCTIONAL

## 2021-11-17 DEVICE — TRAY TIBIAL CEMENT SZ 3 COBAL: Type: IMPLANTABLE DEVICE | Site: KNEE | Status: FUNCTIONAL

## 2021-11-17 DEVICE — CEMENT BONE ANTIBIO SIMPLEX P: Type: IMPLANTABLE DEVICE | Site: KNEE | Status: FUNCTIONAL

## 2021-11-17 DEVICE — COMPONENT FEM PS CEM SZ4 R: Type: IMPLANTABLE DEVICE | Site: KNEE | Status: FUNCTIONAL

## 2021-11-17 RX ORDER — PROCHLORPERAZINE EDISYLATE 5 MG/ML
5 INJECTION INTRAMUSCULAR; INTRAVENOUS EVERY 30 MIN PRN
Status: ACTIVE | OUTPATIENT
Start: 2021-11-17 | End: 2021-11-17

## 2021-11-17 RX ORDER — OXYCODONE HYDROCHLORIDE 5 MG/1
10 TABLET ORAL
Status: DISCONTINUED | OUTPATIENT
Start: 2021-11-17 | End: 2021-11-17 | Stop reason: HOSPADM

## 2021-11-17 RX ORDER — OXYCODONE AND ACETAMINOPHEN 5; 325 MG/1; MG/1
1 TABLET ORAL EVERY 6 HOURS PRN
Qty: 45 TABLET | Refills: 0 | Status: SHIPPED | OUTPATIENT
Start: 2021-11-17 | End: 2022-03-11

## 2021-11-17 RX ORDER — ONDANSETRON 2 MG/ML
4 INJECTION INTRAMUSCULAR; INTRAVENOUS EVERY 6 HOURS PRN
Status: CANCELLED | OUTPATIENT
Start: 2021-11-17

## 2021-11-17 RX ORDER — EPHEDRINE SULFATE 50 MG/ML
INJECTION, SOLUTION INTRAVENOUS
Status: DISCONTINUED | OUTPATIENT
Start: 2021-11-17 | End: 2021-11-17

## 2021-11-17 RX ORDER — SODIUM CHLORIDE, SODIUM LACTATE, POTASSIUM CHLORIDE, CALCIUM CHLORIDE 600; 310; 30; 20 MG/100ML; MG/100ML; MG/100ML; MG/100ML
INJECTION, SOLUTION INTRAVENOUS CONTINUOUS
Status: DISCONTINUED | OUTPATIENT
Start: 2021-11-17 | End: 2021-11-17 | Stop reason: HOSPADM

## 2021-11-17 RX ORDER — AMOXICILLIN 250 MG
1 CAPSULE ORAL 2 TIMES DAILY
Status: CANCELLED | OUTPATIENT
Start: 2021-11-17

## 2021-11-17 RX ORDER — ACETAMINOPHEN 500 MG
1000 TABLET ORAL 3 TIMES DAILY
Status: CANCELLED | OUTPATIENT
Start: 2021-11-17

## 2021-11-17 RX ORDER — FAMOTIDINE 20 MG/1
20 TABLET, FILM COATED ORAL DAILY
Status: CANCELLED | OUTPATIENT
Start: 2021-11-17

## 2021-11-17 RX ORDER — ZOLPIDEM TARTRATE 5 MG/1
5 TABLET ORAL NIGHTLY PRN
Status: CANCELLED | OUTPATIENT
Start: 2021-11-17

## 2021-11-17 RX ORDER — LACTULOSE 10 G/15ML
20 SOLUTION ORAL EVERY 6 HOURS PRN
Status: CANCELLED | OUTPATIENT
Start: 2021-11-17

## 2021-11-17 RX ORDER — BUPIVACAINE HYDROCHLORIDE 2.5 MG/ML
INJECTION, SOLUTION EPIDURAL; INFILTRATION; INTRACAUDAL
Status: COMPLETED | OUTPATIENT
Start: 2021-11-17 | End: 2021-11-17

## 2021-11-17 RX ORDER — MUPIROCIN 20 MG/G
OINTMENT TOPICAL
Status: DISCONTINUED | OUTPATIENT
Start: 2021-11-17 | End: 2021-11-17 | Stop reason: HOSPADM

## 2021-11-17 RX ORDER — ONDANSETRON 4 MG/1
4 TABLET, ORALLY DISINTEGRATING ORAL EVERY 6 HOURS PRN
Status: CANCELLED | OUTPATIENT
Start: 2021-11-17

## 2021-11-17 RX ORDER — LIDOCAINE HYDROCHLORIDE 10 MG/ML
1 INJECTION, SOLUTION EPIDURAL; INFILTRATION; INTRACAUDAL; PERINEURAL ONCE
Status: DISCONTINUED | OUTPATIENT
Start: 2021-11-17 | End: 2021-11-17 | Stop reason: HOSPADM

## 2021-11-17 RX ORDER — SODIUM CHLORIDE 0.9 % (FLUSH) 0.9 %
5 SYRINGE (ML) INJECTION
Status: CANCELLED | OUTPATIENT
Start: 2021-11-17

## 2021-11-17 RX ORDER — HYDROMORPHONE HYDROCHLORIDE 2 MG/ML
0.5 INJECTION, SOLUTION INTRAMUSCULAR; INTRAVENOUS; SUBCUTANEOUS EVERY 6 HOURS PRN
Status: CANCELLED | OUTPATIENT
Start: 2021-11-17

## 2021-11-17 RX ORDER — POLYETHYLENE GLYCOL 3350 17 G/17G
17 POWDER, FOR SOLUTION ORAL DAILY
Status: CANCELLED | OUTPATIENT
Start: 2021-11-17

## 2021-11-17 RX ORDER — HYDROCODONE BITARTRATE AND ACETAMINOPHEN 5; 325 MG/1; MG/1
1 TABLET ORAL
Status: DISCONTINUED | OUTPATIENT
Start: 2021-11-17 | End: 2021-11-17 | Stop reason: HOSPADM

## 2021-11-17 RX ORDER — SODIUM CHLORIDE 0.9 % (FLUSH) 0.9 %
10 SYRINGE (ML) INJECTION
Status: DISCONTINUED | OUTPATIENT
Start: 2021-11-17 | End: 2021-11-17 | Stop reason: HOSPADM

## 2021-11-17 RX ORDER — DIPHENHYDRAMINE HYDROCHLORIDE 50 MG/ML
25 INJECTION INTRAMUSCULAR; INTRAVENOUS EVERY 6 HOURS PRN
Status: DISCONTINUED | OUTPATIENT
Start: 2021-11-17 | End: 2021-11-17 | Stop reason: HOSPADM

## 2021-11-17 RX ORDER — PREGABALIN 75 MG/1
150 CAPSULE ORAL
Status: COMPLETED | OUTPATIENT
Start: 2021-11-17 | End: 2021-11-17

## 2021-11-17 RX ORDER — SODIUM CHLORIDE 0.9 % (FLUSH) 0.9 %
3 SYRINGE (ML) INJECTION EVERY 8 HOURS
Status: DISCONTINUED | OUTPATIENT
Start: 2021-11-17 | End: 2021-11-17 | Stop reason: HOSPADM

## 2021-11-17 RX ORDER — PROPOFOL 10 MG/ML
VIAL (ML) INTRAVENOUS
Status: DISCONTINUED | OUTPATIENT
Start: 2021-11-17 | End: 2021-11-17

## 2021-11-17 RX ORDER — BISACODYL 10 MG
10 SUPPOSITORY, RECTAL RECTAL DAILY PRN
Status: CANCELLED | OUTPATIENT
Start: 2021-11-17

## 2021-11-17 RX ORDER — ACETAMINOPHEN 500 MG
1000 TABLET ORAL
Status: COMPLETED | OUTPATIENT
Start: 2021-11-17 | End: 2021-11-17

## 2021-11-17 RX ORDER — HYDROMORPHONE HYDROCHLORIDE 2 MG/ML
0.2 INJECTION, SOLUTION INTRAMUSCULAR; INTRAVENOUS; SUBCUTANEOUS EVERY 5 MIN PRN
Status: ACTIVE | OUTPATIENT
Start: 2021-11-17 | End: 2021-11-17

## 2021-11-17 RX ORDER — TRANEXAMIC ACID 100 MG/ML
INJECTION, SOLUTION INTRAVENOUS
Status: DISCONTINUED | OUTPATIENT
Start: 2021-11-17 | End: 2021-11-17 | Stop reason: HOSPADM

## 2021-11-17 RX ORDER — OXYCODONE HYDROCHLORIDE 5 MG/1
5 TABLET ORAL
Status: DISCONTINUED | OUTPATIENT
Start: 2021-11-17 | End: 2021-11-17 | Stop reason: HOSPADM

## 2021-11-17 RX ORDER — NAPROXEN 500 MG/1
500 TABLET ORAL
Status: COMPLETED | OUTPATIENT
Start: 2021-11-17 | End: 2021-11-17

## 2021-11-17 RX ORDER — TRANEXAMIC ACID 100 MG/ML
1000 INJECTION, SOLUTION INTRAVENOUS
Status: COMPLETED | OUTPATIENT
Start: 2021-11-17 | End: 2021-11-17

## 2021-11-17 RX ORDER — MIDAZOLAM HYDROCHLORIDE 1 MG/ML
INJECTION, SOLUTION INTRAMUSCULAR; INTRAVENOUS
Status: DISCONTINUED | OUTPATIENT
Start: 2021-11-17 | End: 2021-11-17

## 2021-11-17 RX ORDER — FLUTICASONE PROPIONATE 50 MCG
1 SPRAY, SUSPENSION (ML) NASAL DAILY
Status: CANCELLED | OUTPATIENT
Start: 2021-11-18

## 2021-11-17 RX ORDER — SODIUM CHLORIDE 9 MG/ML
INJECTION, SOLUTION INTRAVENOUS CONTINUOUS
Status: CANCELLED | OUTPATIENT
Start: 2021-11-17 | End: 2021-11-18

## 2021-11-17 RX ORDER — SCOLOPAMINE TRANSDERMAL SYSTEM 1 MG/1
1 PATCH, EXTENDED RELEASE TRANSDERMAL
Status: DISCONTINUED | OUTPATIENT
Start: 2021-11-17 | End: 2021-11-17 | Stop reason: HOSPADM

## 2021-11-17 RX ORDER — BUPIVACAINE HYDROCHLORIDE 5 MG/ML
INJECTION, SOLUTION EPIDURAL; INTRACAUDAL
Status: COMPLETED | OUTPATIENT
Start: 2021-11-17 | End: 2021-11-17

## 2021-11-17 RX ORDER — CEFAZOLIN SODIUM 2 G/50ML
2 SOLUTION INTRAVENOUS
Status: CANCELLED | OUTPATIENT
Start: 2021-11-17 | End: 2021-11-18

## 2021-11-17 RX ORDER — ONDANSETRON 2 MG/ML
4 INJECTION INTRAMUSCULAR; INTRAVENOUS ONCE AS NEEDED
Status: DISCONTINUED | OUTPATIENT
Start: 2021-11-17 | End: 2021-11-17 | Stop reason: HOSPADM

## 2021-11-17 RX ADMIN — BUPIVACAINE HYDROCHLORIDE 30 ML: 2.5 INJECTION, SOLUTION EPIDURAL; INFILTRATION; INTRACAUDAL; PERINEURAL at 02:11

## 2021-11-17 RX ADMIN — MIDAZOLAM 2 MG: 1 INJECTION INTRAMUSCULAR; INTRAVENOUS at 10:11

## 2021-11-17 RX ADMIN — DEXTROSE 3 G: 50 INJECTION, SOLUTION INTRAVENOUS at 11:11

## 2021-11-17 RX ADMIN — SODIUM CHLORIDE, SODIUM LACTATE, POTASSIUM CHLORIDE, AND CALCIUM CHLORIDE: .6; .31; .03; .02 INJECTION, SOLUTION INTRAVENOUS at 10:11

## 2021-11-17 RX ADMIN — MUPIROCIN: 20 OINTMENT TOPICAL at 08:11

## 2021-11-17 RX ADMIN — SCOPALAMINE 1 PATCH: 1 PATCH, EXTENDED RELEASE TRANSDERMAL at 08:11

## 2021-11-17 RX ADMIN — PROPOFOL 80 MCG/KG/MIN: 10 INJECTION, EMULSION INTRAVENOUS at 11:11

## 2021-11-17 RX ADMIN — NAPROXEN 500 MG: 500 TABLET ORAL at 07:11

## 2021-11-17 RX ADMIN — ACETAMINOPHEN 1000 MG: 500 TABLET ORAL at 07:11

## 2021-11-17 RX ADMIN — BUPIVACAINE HYDROCHLORIDE 1.5 ML: 5 INJECTION, SOLUTION EPIDURAL; INTRACAUDAL at 11:11

## 2021-11-17 RX ADMIN — TRANEXAMIC ACID 1000 MG: 100 INJECTION, SOLUTION INTRAVENOUS at 11:11

## 2021-11-17 RX ADMIN — SODIUM CHLORIDE, SODIUM LACTATE, POTASSIUM CHLORIDE, AND CALCIUM CHLORIDE: .6; .31; .03; .02 INJECTION, SOLUTION INTRAVENOUS at 08:11

## 2021-11-17 RX ADMIN — EPHEDRINE SULFATE 10 MG: 50 INJECTION, SOLUTION INTRAMUSCULAR; INTRAVENOUS; SUBCUTANEOUS at 11:11

## 2021-11-17 RX ADMIN — PREGABALIN 150 MG: 75 CAPSULE ORAL at 07:11

## 2021-11-18 ENCOUNTER — TELEPHONE (OUTPATIENT)
Dept: OBSTETRICS AND GYNECOLOGY | Facility: CLINIC | Age: 46
End: 2021-11-18
Payer: MEDICAID

## 2021-11-18 DIAGNOSIS — N83.209 CYST OF OVARY, UNSPECIFIED LATERALITY: Primary | ICD-10-CM

## 2021-11-21 ENCOUNTER — PATIENT MESSAGE (OUTPATIENT)
Dept: ORTHOPEDICS | Facility: CLINIC | Age: 46
End: 2021-11-21
Payer: MEDICAID

## 2021-11-22 ENCOUNTER — TELEPHONE (OUTPATIENT)
Dept: ORTHOPEDICS | Facility: CLINIC | Age: 46
End: 2021-11-22
Payer: MEDICAID

## 2021-11-26 ENCOUNTER — OFFICE VISIT (OUTPATIENT)
Dept: ORTHOPEDICS | Facility: CLINIC | Age: 46
End: 2021-11-26
Payer: MEDICAID

## 2021-11-26 ENCOUNTER — PATIENT MESSAGE (OUTPATIENT)
Dept: ORTHOPEDICS | Facility: CLINIC | Age: 46
End: 2021-11-26

## 2021-11-26 VITALS — BODY MASS INDEX: 37.67 KG/M2 | HEIGHT: 66 IN | WEIGHT: 234.38 LBS

## 2021-11-26 DIAGNOSIS — Z96.651 S/P TOTAL KNEE ARTHROPLASTY, RIGHT: Primary | ICD-10-CM

## 2021-11-26 PROCEDURE — 99999 PR PBB SHADOW E&M-EST. PATIENT-LVL III: ICD-10-PCS | Mod: PBBFAC,,, | Performed by: ORTHOPAEDIC SURGERY

## 2021-11-26 PROCEDURE — 99213 OFFICE O/P EST LOW 20 MIN: CPT | Mod: PBBFAC,PN | Performed by: ORTHOPAEDIC SURGERY

## 2021-11-26 PROCEDURE — 99499 NO LOS: ICD-10-PCS | Mod: S$PBB,,, | Performed by: ORTHOPAEDIC SURGERY

## 2021-11-26 PROCEDURE — 99999 PR PBB SHADOW E&M-EST. PATIENT-LVL III: CPT | Mod: PBBFAC,,, | Performed by: ORTHOPAEDIC SURGERY

## 2021-11-26 PROCEDURE — 99499 UNLISTED E&M SERVICE: CPT | Mod: S$PBB,,, | Performed by: ORTHOPAEDIC SURGERY

## 2021-11-26 RX ORDER — PREGABALIN 150 MG/1
150 CAPSULE ORAL NIGHTLY
Qty: 30 CAPSULE | Refills: 0 | Status: SHIPPED | OUTPATIENT
Start: 2021-11-26 | End: 2021-12-03 | Stop reason: ALTCHOICE

## 2021-11-29 ENCOUNTER — EXTERNAL HOME HEALTH (OUTPATIENT)
Dept: HOME HEALTH SERVICES | Facility: HOSPITAL | Age: 46
End: 2021-11-29
Payer: MEDICAID

## 2021-12-02 ENCOUNTER — PATIENT MESSAGE (OUTPATIENT)
Dept: ORTHOPEDICS | Facility: CLINIC | Age: 46
End: 2021-12-02
Payer: MEDICAID

## 2021-12-02 ENCOUNTER — PATIENT MESSAGE (OUTPATIENT)
Dept: OBSTETRICS AND GYNECOLOGY | Facility: CLINIC | Age: 46
End: 2021-12-02
Payer: MEDICAID

## 2021-12-03 ENCOUNTER — OFFICE VISIT (OUTPATIENT)
Dept: ORTHOPEDICS | Facility: CLINIC | Age: 46
End: 2021-12-03
Payer: MEDICAID

## 2021-12-03 ENCOUNTER — HOSPITAL ENCOUNTER (OUTPATIENT)
Dept: RADIOLOGY | Facility: HOSPITAL | Age: 46
Discharge: HOME | End: 2021-12-03
Attending: ORTHOPAEDIC SURGERY
Payer: MEDICAID

## 2021-12-03 VITALS — WEIGHT: 234.38 LBS | HEIGHT: 66 IN | BODY MASS INDEX: 37.67 KG/M2

## 2021-12-03 DIAGNOSIS — Z96.651 S/P TOTAL KNEE ARTHROPLASTY, RIGHT: ICD-10-CM

## 2021-12-03 DIAGNOSIS — M17.11 PRIMARY OSTEOARTHRITIS OF RIGHT KNEE: Primary | ICD-10-CM

## 2021-12-03 PROCEDURE — 73560 X-RAY EXAM OF KNEE 1 OR 2: CPT | Mod: TC,PN,RT

## 2021-12-03 PROCEDURE — 99999 PR PBB SHADOW E&M-EST. PATIENT-LVL III: ICD-10-PCS | Mod: PBBFAC,,, | Performed by: ORTHOPAEDIC SURGERY

## 2021-12-03 PROCEDURE — 99999 PR PBB SHADOW E&M-EST. PATIENT-LVL III: CPT | Mod: PBBFAC,,, | Performed by: ORTHOPAEDIC SURGERY

## 2021-12-03 PROCEDURE — 73560 XR KNEE 1 OR 2 VIEW RIGHT: ICD-10-PCS | Mod: 26,RT,, | Performed by: INTERNAL MEDICINE

## 2021-12-03 PROCEDURE — 99024 POSTOP FOLLOW-UP VISIT: CPT | Mod: ,,, | Performed by: ORTHOPAEDIC SURGERY

## 2021-12-03 PROCEDURE — 99213 OFFICE O/P EST LOW 20 MIN: CPT | Mod: PBBFAC,PN | Performed by: ORTHOPAEDIC SURGERY

## 2021-12-03 PROCEDURE — 73560 X-RAY EXAM OF KNEE 1 OR 2: CPT | Mod: 26,RT,, | Performed by: INTERNAL MEDICINE

## 2021-12-03 PROCEDURE — 99024 PR POST-OP FOLLOW-UP VISIT: ICD-10-PCS | Mod: ,,, | Performed by: ORTHOPAEDIC SURGERY

## 2021-12-03 RX ORDER — GABAPENTIN 300 MG/1
300 CAPSULE ORAL NIGHTLY
Qty: 30 CAPSULE | Refills: 1 | Status: SHIPPED | OUTPATIENT
Start: 2021-12-03 | End: 2022-02-21

## 2021-12-07 ENCOUNTER — PATIENT MESSAGE (OUTPATIENT)
Dept: OBSTETRICS AND GYNECOLOGY | Facility: CLINIC | Age: 46
End: 2021-12-07
Payer: MEDICAID

## 2021-12-08 ENCOUNTER — TELEPHONE (OUTPATIENT)
Dept: ORTHOPEDICS | Facility: CLINIC | Age: 46
End: 2021-12-08
Payer: MEDICAID

## 2021-12-08 DIAGNOSIS — Z96.651 S/P TOTAL KNEE ARTHROPLASTY, RIGHT: Primary | ICD-10-CM

## 2021-12-09 ENCOUNTER — PATIENT MESSAGE (OUTPATIENT)
Dept: ORTHOPEDICS | Facility: CLINIC | Age: 46
End: 2021-12-09
Payer: MEDICAID

## 2021-12-09 ENCOUNTER — TELEPHONE (OUTPATIENT)
Dept: ORTHOPEDICS | Facility: CLINIC | Age: 46
End: 2021-12-09
Payer: MEDICAID

## 2021-12-14 ENCOUNTER — CLINICAL SUPPORT (OUTPATIENT)
Dept: REHABILITATION | Facility: HOSPITAL | Age: 46
End: 2021-12-14
Payer: MEDICAID

## 2021-12-14 DIAGNOSIS — R53.1 DECREASED STRENGTH, ENDURANCE, AND MOBILITY: ICD-10-CM

## 2021-12-14 DIAGNOSIS — Z74.09 DECREASED STRENGTH, ENDURANCE, AND MOBILITY: ICD-10-CM

## 2021-12-14 DIAGNOSIS — R68.89 DECREASED STRENGTH, ENDURANCE, AND MOBILITY: ICD-10-CM

## 2021-12-14 DIAGNOSIS — Z96.651 S/P TOTAL KNEE ARTHROPLASTY, RIGHT: ICD-10-CM

## 2021-12-14 DIAGNOSIS — M25.661 DECREASED RANGE OF MOTION OF RIGHT KNEE: ICD-10-CM

## 2021-12-14 PROBLEM — M25.669 DECREASED RANGE OF MOTION (ROM) OF KNEE: Status: ACTIVE | Noted: 2021-12-14

## 2021-12-14 PROCEDURE — 97162 PT EVAL MOD COMPLEX 30 MIN: CPT | Mod: PN

## 2021-12-16 ENCOUNTER — CLINICAL SUPPORT (OUTPATIENT)
Dept: REHABILITATION | Facility: HOSPITAL | Age: 46
End: 2021-12-16
Payer: MEDICAID

## 2021-12-16 ENCOUNTER — DOCUMENTATION ONLY (OUTPATIENT)
Dept: REHABILITATION | Facility: HOSPITAL | Age: 46
End: 2021-12-16

## 2021-12-16 DIAGNOSIS — M25.661 DECREASED RANGE OF MOTION OF RIGHT KNEE: ICD-10-CM

## 2021-12-16 DIAGNOSIS — Z74.09 DECREASED STRENGTH, ENDURANCE, AND MOBILITY: ICD-10-CM

## 2021-12-16 DIAGNOSIS — R68.89 DECREASED STRENGTH, ENDURANCE, AND MOBILITY: ICD-10-CM

## 2021-12-16 DIAGNOSIS — R53.1 DECREASED STRENGTH, ENDURANCE, AND MOBILITY: ICD-10-CM

## 2021-12-16 PROCEDURE — 97110 THERAPEUTIC EXERCISES: CPT | Mod: PN,CQ

## 2021-12-17 ENCOUNTER — CLINICAL SUPPORT (OUTPATIENT)
Dept: REHABILITATION | Facility: HOSPITAL | Age: 46
End: 2021-12-17
Payer: MEDICAID

## 2021-12-17 DIAGNOSIS — R53.1 DECREASED STRENGTH, ENDURANCE, AND MOBILITY: ICD-10-CM

## 2021-12-17 DIAGNOSIS — Z74.09 DECREASED STRENGTH, ENDURANCE, AND MOBILITY: ICD-10-CM

## 2021-12-17 DIAGNOSIS — M25.661 DECREASED RANGE OF MOTION OF RIGHT KNEE: ICD-10-CM

## 2021-12-17 DIAGNOSIS — R68.89 DECREASED STRENGTH, ENDURANCE, AND MOBILITY: ICD-10-CM

## 2021-12-17 PROCEDURE — 97110 THERAPEUTIC EXERCISES: CPT | Mod: PN,CQ

## 2021-12-29 ENCOUNTER — CLINICAL SUPPORT (OUTPATIENT)
Dept: REHABILITATION | Facility: HOSPITAL | Age: 46
End: 2021-12-29
Payer: MEDICAID

## 2021-12-29 DIAGNOSIS — Z74.09 DECREASED STRENGTH, ENDURANCE, AND MOBILITY: ICD-10-CM

## 2021-12-29 DIAGNOSIS — M25.661 DECREASED RANGE OF MOTION OF RIGHT KNEE: ICD-10-CM

## 2021-12-29 DIAGNOSIS — R53.1 DECREASED STRENGTH, ENDURANCE, AND MOBILITY: ICD-10-CM

## 2021-12-29 DIAGNOSIS — R68.89 DECREASED STRENGTH, ENDURANCE, AND MOBILITY: ICD-10-CM

## 2021-12-29 PROCEDURE — 97110 THERAPEUTIC EXERCISES: CPT | Mod: PN

## 2022-01-03 ENCOUNTER — CLINICAL SUPPORT (OUTPATIENT)
Dept: REHABILITATION | Facility: HOSPITAL | Age: 47
End: 2022-01-03
Payer: MEDICAID

## 2022-01-03 DIAGNOSIS — R68.89 DECREASED STRENGTH, ENDURANCE, AND MOBILITY: ICD-10-CM

## 2022-01-03 DIAGNOSIS — Z74.09 DECREASED STRENGTH, ENDURANCE, AND MOBILITY: ICD-10-CM

## 2022-01-03 DIAGNOSIS — M25.661 DECREASED RANGE OF MOTION OF RIGHT KNEE: ICD-10-CM

## 2022-01-03 DIAGNOSIS — R53.1 DECREASED STRENGTH, ENDURANCE, AND MOBILITY: ICD-10-CM

## 2022-01-03 PROCEDURE — 97110 THERAPEUTIC EXERCISES: CPT | Mod: PN,CQ

## 2022-01-03 NOTE — PROGRESS NOTES
"[facility]   Physical Therapy Treatment Note     Name: Cristina MCKEON [place]  Clinic Number: [id number]    Therapy Diagnosis:   Encounter Diagnoses   Name Primary?    Decreased strength, endurance, and mobility     Decreased range of motion of right knee      Physician: Maryjane Harris PA-C    Visit Date: 1/3/2022    Physician Orders: PT Eval only and Treat   Medical Diagnosis from Referral: Z96.651 (ICD-10-CM) - S/P total knee arthroplasty, right  Evaluation Date: 12/14/2021  Authorization Period Expiration: 12/31/21  Plan of Care Expiration: 3/16/22  Visit # / Visits authorized: 4/8  FOTO: 5/10 NEXT  PTA Visit #: 1/5    Time In: 9:05 AM  Time Out: 9:45 AM  Total Billable Time: 40 minutes (3 TE - Medicaid)     Precautions: Standard, hx of DVT and PE     SUBJECTIVE     Pt reports: Continues to only ambulate with SPC when she is in a busy environment. Minimal to no complaints of pain.   She was partially compliant with home exercise program.  Response to previous treatment: minimal soreness, but overall good response  Functional change: Ongoing    Pain: 0/10 (1/10 stiffness)  Location: right back (occasionally) and along medial and lateral portions of the knee, but no retropatellar pain    OBJECTIVE     Objective Measures updated at progress report unless specified.     12/29/2021  AROM:   Right knee: 0-0-96  PROM:   Right knee: 0-0-100    Treatment     Cristina received the treatments listed below:      therapeutic exercises to develop strength, endurance, ROM, flexibility, posture and core stabilization for 30 minutes including (bulleted exercises only):  · Nu Step: 5' lvl 1  · Quad sets: 5"x20  · Short arc quads: 3x10 B  · Straight leg raise: 2x10  · Long arc quads: 1.5#; 3x10   Standing marching: 2x10   · STS from hi lo mat (20in) with yellow TB: 3x10   · Lunges at 2nd step - 2' ea  Heel raises 2x10  Ambulation with no AD and SBA ~300ft for muscle endurance and strengthening     manual therapy " techniques: Joint mobilizations, Myofacial release and Soft tissue Mobilization were applied to the: R knee for 10 minutes, including:  STM/MFR right knee  Scar tissue mobilization via cross friction massage and skin rolling  patellar mobs in all directions  Physiological knee flex/ext in sitting      Patient Education and Home Exercises     Home Exercises Provided and Patient Education Provided     Education provided:   · Anatomy and Pathology.  · Symptom management and plan of care progressions.  · Home Exercise Program.    Written Home Exercises Provided: Patient instructed to cont prior HEP. Exercises were reviewed and Cristina was able to demonstrate them prior to the end of the session.  Cristina demonstrated good  understanding of the education provided. See EMR under Patient Instructions for exercises provided during therapy sessions    ASSESSMENT   Continues to present with no AD or complaints of knee pain and very minimal stiffness in her R knee. Able to complete all therex with minimal fatigue and no increased pain. Tolerate progressions well. Continue to focus on quad and hip strengthening & progress as tolerated.    Cristina Is progressing well towards her goals.   Pt prognosis is Good.     Pt will continue to benefit from skilled outpatient physical therapy to address the deficits listed in the problem list box on initial evaluation, provide pt/family education and to maximize pt's level of independence in the home and community environment.     Pt's spiritual, cultural and educational needs considered and pt agreeable to plan of care and goals.     Anticipated barriers to physical therapy: co-morbidities, lifestyle    Goals:  Short Term Goals:     1. Pt will be independent with HEP supplement PT in improving functional mobility. (Ongoing, Not met)  2. Pt will improve R LE strength to at least 75% of L LE strength as measured via MicroFet handheld dynamometer in order to improve functional mobility (Ongoing, Not  met)  3. Pt will improve R knee AROM to at least 100 deg in order to improve gait (Ongoing, Not met)     Long Term Goals:  1. Pt will be independent with updated HEP supplement PT in improving functional mobility. (Ongoing, Not met)  2. Pt will improve R LE strength to at least 90% of L LE strength as measured via MicroFet handheld dynamometer in order to improve functional mobility (Ongoing, Not met)  3. Pt will improve R knee AROM to at least 115 deg in order to improve gait and ability to perform ADLs (Ongoing, Not met)  4. Pt will improve FOTO knee survey score to </= 28% limited in order to demo improved functional mobility (Ongoing, Not met)  5. Pt will improve Koos Jr. Score by 15 pts or better in order to demo improved functional mobility. (Ongoing, Not met)  6. Pt will perform TUG in < 10 seconds without AD in order to demo improved gait speed. (Ongoing, Not met)  7. Pt will perform at least 15 sit to stands without UE support on 30 second sit to stand test in order to demo improved ability to perform transfers. (Ongoing, Not met)    PLAN     Plan of care Certification: 12/14/2021 to 2/8/22  Cont with treatment per POC.    I certify that I was present in the room directing the student in service delivery and guiding them using my skilled judgment. As the co-signing therapist I have reviewed the students documentation and am responsible for the treatment, assessment, and plan.     Zeina Briseno, PTA

## 2022-01-04 ENCOUNTER — CLINICAL SUPPORT (OUTPATIENT)
Dept: REHABILITATION | Facility: HOSPITAL | Age: 47
End: 2022-01-04
Payer: MEDICAID

## 2022-01-04 DIAGNOSIS — R53.1 DECREASED STRENGTH, ENDURANCE, AND MOBILITY: ICD-10-CM

## 2022-01-04 DIAGNOSIS — R68.89 DECREASED STRENGTH, ENDURANCE, AND MOBILITY: ICD-10-CM

## 2022-01-04 DIAGNOSIS — Z74.09 DECREASED STRENGTH, ENDURANCE, AND MOBILITY: ICD-10-CM

## 2022-01-04 DIAGNOSIS — M25.661 DECREASED RANGE OF MOTION OF RIGHT KNEE: ICD-10-CM

## 2022-01-04 PROCEDURE — 97110 THERAPEUTIC EXERCISES: CPT | Mod: PN

## 2022-01-04 NOTE — PROGRESS NOTES
"[facility]   Physical Therapy Treatment Note     Name: Cristina MCKEON [facility] Number: [id number]    Therapy Diagnosis:   Encounter Diagnoses   Name Primary?    Decreased strength, endurance, and mobility     Decreased range of motion of right knee      Physician: Maryjane Harris PA-C    Visit Date: 1/4/2022    Physician Orders: PT Eval only and Treat   Medical Diagnosis from Referral: Z96.651 (ICD-10-CM) - S/P total knee arthroplasty, right  Evaluation Date: 12/14/2021  Authorization Period Expiration: 12/31/21  Plan of Care Expiration: 3/16/22  Visit # / Visits authorized: 5/8  FOTO: 5/10 completed 1/4/22  PTA Visit #: 1/5    Time In: 11:30 AM  Time Out: 12:15 PM  Total Billable Time: 45 minutes (3 TE - Medicaid)     Precautions: Standard, hx of DVT and PE     SUBJECTIVE     Pt reports: she has some L knee pain greater than R today. Attributes some pain to weather.   She was partially compliant with home exercise program.  Response to previous treatment: minimal soreness, but overall good response  Functional change: Ongoing, walking without AD    Pain: 0/10 (1/10 stiffness)  Location: right back (occasionally) and along medial and lateral portions of the knee, but no retropatellar pain    OBJECTIVE     Objective Measures updated at progress report unless specified.     1/4/2022  AROM:   Right knee: 0-0-97  PROM:   Right knee: 0-0-101    FOTO: 30% limitation    Treatment     Cristina received the treatments listed below:      therapeutic exercises to develop strength, endurance, ROM, flexibility, posture and core stabilization for 35 minutes including (bulleted exercises only):  Nu Step: 5' lvl 1 NT  · Bike L1 4 min full fwd revolutions  · Heel slides 15x  · Quad sets: 5"x20  · Short arc quads: 3x10 B  · Straight leg raise: 2x10 B  · Long arc quads: 2#; 3x10 -increase weight or increased hold duration next  Standing marching: 2x10   · STS from hi lo mat (20in) with yellow TB: 3x10 -ball " next   · Lunges at 2nd step - 2' ea OOT  · Step up 4in 1x10 B fwd/lat  Heel raises 2x10  Ambulation with no AD and SBA ~300ft for muscle endurance and strengthening     manual therapy techniques: Joint mobilizations, Myofacial release and Soft tissue Mobilization were applied to the: R knee for 10 minutes, including:  STM/MFR right knee  Scar tissue mobilization via cross friction massage and skin rolling  patellar mobs in all directions  Physiological knee flex/ext in sitting      Patient Education and Home Exercises     Home Exercises Provided and Patient Education Provided     Education provided:   · Anatomy and Pathology.  · Symptom management and plan of care progressions.  · Home Exercise Program.    Written Home Exercises Provided: Patient instructed to cont prior HEP. Exercises were reviewed and Cristina was able to demonstrate them prior to the end of the session.  Cristina demonstrated good  understanding of the education provided. See EMR under Patient Instructions for exercises provided during therapy sessions    ASSESSMENT   Pt presents with minimal to no R knee pain today but increased L knee pain. Slow but steady improvements noted in knee ROM. Pt requires cuing for appropriate quad activation vs glute compensation with quad sets. She continues to fatigue easily with SLR. Implemented step up today with good tolerance. Tactile cuing for forward weight shift to allow tibia to translate forward before fully extending knee as she steps up. Pt continues to ambulate with somewhat antalgic gait pattern with increased sway and verbalizes feeling L LE shorter than R. Continue to progress standing exercises and quad endurance in upcoming visits.     Cristina Is progressing well towards her goals.   Pt prognosis is Good.     Pt will continue to benefit from skilled outpatient physical therapy to address the deficits listed in the problem list box on initial evaluation, provide pt/family education and to maximize pt's level of  independence in the home and community environment.     Pt's spiritual, cultural and educational needs considered and pt agreeable to plan of care and goals.     Anticipated barriers to physical therapy: co-morbidities, lifestyle    Goals:  Short Term Goals:     1. Pt will be independent with HEP supplement PT in improving functional mobility. (Ongoing, Not met)  2. Pt will improve R LE strength to at least 75% of L LE strength as measured via MicroFet handheld dynamometer in order to improve functional mobility (Ongoing, Not met)  3. Pt will improve R knee AROM to at least 100 deg in order to improve gait (Ongoing, Not met)     Long Term Goals:  1. Pt will be independent with updated HEP supplement PT in improving functional mobility. (Ongoing, Not met)  2. Pt will improve R LE strength to at least 90% of L LE strength as measured via MicroFet handheld dynamometer in order to improve functional mobility (Ongoing, Not met)  3. Pt will improve R knee AROM to at least 115 deg in order to improve gait and ability to perform ADLs (Ongoing, Not met)  4. Pt will improve FOTO knee survey score to </= 28% limited in order to demo improved functional mobility (Ongoing, Not met)  5. Pt will improve Koos Jr. Score by 15 pts or better in order to demo improved functional mobility. (Ongoing, Not met)  6. Pt will perform TUG in < 10 seconds without AD in order to demo improved gait speed. (Ongoing, Not met)  7. Pt will perform at least 15 sit to stands without UE support on 30 second sit to stand test in order to demo improved ability to perform transfers. (Ongoing, Not met)    PLAN     Plan of care Certification: 12/14/2021 to 2/8/22  Cont with treatment per POC.    I certify that I was present in the room directing the student in service delivery and guiding them using my skilled judgment. As the co-signing therapist I have reviewed the students documentation and am responsible for the treatment, assessment, and plan.      MICHAEL STANTON, PT

## 2022-01-10 ENCOUNTER — CLINICAL SUPPORT (OUTPATIENT)
Dept: REHABILITATION | Facility: HOSPITAL | Age: 47
End: 2022-01-10
Payer: MEDICAID

## 2022-01-10 DIAGNOSIS — Z74.09 DECREASED STRENGTH, ENDURANCE, AND MOBILITY: ICD-10-CM

## 2022-01-10 DIAGNOSIS — M25.661 DECREASED RANGE OF MOTION OF RIGHT KNEE: ICD-10-CM

## 2022-01-10 DIAGNOSIS — R53.1 DECREASED STRENGTH, ENDURANCE, AND MOBILITY: ICD-10-CM

## 2022-01-10 DIAGNOSIS — R68.89 DECREASED STRENGTH, ENDURANCE, AND MOBILITY: ICD-10-CM

## 2022-01-10 PROCEDURE — 97110 THERAPEUTIC EXERCISES: CPT | Mod: PN,CQ

## 2022-01-10 NOTE — PROGRESS NOTES
"[facility]   Physical Therapy Treatment Note     Name: Cristina MCKEON [facility] Number: [id number]    Therapy Diagnosis:   Encounter Diagnoses   Name Primary?    Decreased strength, endurance, and mobility     Decreased range of motion of right knee      Physician: Maryjane Harris PA-C    Visit Date: 1/10/2022    Physician Orders: PT Eval only and Treat   Medical Diagnosis from Referral: Z96.651 (ICD-10-CM) - S/P total knee arthroplasty, right  Evaluation Date: 12/14/2021  Authorization Period Expiration: 12/31/21  Plan of Care Expiration: 3/16/22  Visit # / Visits authorized: 6/8  FOTO: 7/10 completed 1/4/22  PTA Visit #: 1/5    Time In: 11:30 AM  Time Out: 12:10 PM  Total Billable Time: 40 minutes (3 TE - Medicaid)     Precautions: Standard, hx of DVT and PE     SUBJECTIVE     Pt reports: No pain or stiffness today, says "I feel great today". Reports follow up with MD on   She was partially compliant with home exercise program.  Response to previous treatment: minimal soreness, but overall good response  Functional change: Ongoing, walking without AD    Pain: 0/10 (0/10 stiffness)  Location: right back (occasionally) and along medial and lateral portions of the knee, but no retropatellar pain    OBJECTIVE     Objective Measures updated at progress report unless specified.     1/10/2022  AROM:   Right knee: 0-0-102  PROM:   Right knee: 0-0-108      Treatment     Cristina received the treatments listed below:      therapeutic exercises to develop strength, endurance, ROM, flexibility, posture and core stabilization for 40 minutes including (bulleted exercises only):  Nu Step: 5' lvl 1 NT  · Bike L1 4 min full fwd revolutions  · Heel slides 15x  · Quad sets: 5"x20  · Short arc quads: 3x10 B  · Straight leg raise: 2x10 B  · Long arc quads: 2#; 2x10 3"  Standing marching: 2x10   · STS from hi lo mat (20in) with yellow TB: 3x10 -ball next   · Lunges at 2nd step - 2' ea OOT  · Step up 4in 1x10 B " "fwd/lat  · Standing TKE - Purple cord 3" x10   Heel raises 2x10  Ambulation with no AD and SBA ~300ft for muscle endurance and strengthening     manual therapy techniques: Joint mobilizations, Myofacial release and Soft tissue Mobilization were applied to the: R knee for 00 minutes, including:  STM/MFR right knee  Scar tissue mobilization via cross friction massage and skin rolling  patellar mobs in all directions  Physiological knee flex/ext in sitting      Patient Education and Home Exercises     Home Exercises Provided and Patient Education Provided     Education provided:   · Anatomy and Pathology.  · Symptom management and plan of care progressions.  · Home Exercise Program.    Written Home Exercises Provided: Patient instructed to cont prior HEP. Exercises were reviewed and Cristina was able to demonstrate them prior to the end of the session.  Cristina demonstrated good  understanding of the education provided. See EMR under Patient Instructions for exercises provided during therapy sessions    ASSESSMENT   Pt presents with no reports of knee pain or stiffness. Continues to show gradual improvements in knee ROM evidenced by ROM measurements taken today. Continues to demo slight lag with SLR but improves after rest breaks taken as needed. Implemented standing TKE to continue challenging quad strength with good tolerance and no exacerbation of sx reported.  Continue to progress standing exercises and quad endurance in upcoming visits.     Cristina Is progressing well towards her goals.   Pt prognosis is Good.     Pt will continue to benefit from skilled outpatient physical therapy to address the deficits listed in the problem list box on initial evaluation, provide pt/family education and to maximize pt's level of independence in the home and community environment.     Pt's spiritual, cultural and educational needs considered and pt agreeable to plan of care and goals.     Anticipated barriers to physical therapy: " co-morbidities, lifestyle    Goals:  Short Term Goals:     1. Pt will be independent with HEP supplement PT in improving functional mobility. (Ongoing, Not met)  2. Pt will improve R LE strength to at least 75% of L LE strength as measured via MicroFet handheld dynamometer in order to improve functional mobility (Ongoing, Not met)  3. Pt will improve R knee AROM to at least 100 deg in order to improve gait (Ongoing, Not met)     Long Term Goals:  1. Pt will be independent with updated HEP supplement PT in improving functional mobility. (Ongoing, Not met)  2. Pt will improve R LE strength to at least 90% of L LE strength as measured via MicroFet handheld dynamometer in order to improve functional mobility (Ongoing, Not met)  3. Pt will improve R knee AROM to at least 115 deg in order to improve gait and ability to perform ADLs (Ongoing, Not met)  4. Pt will improve FOTO knee survey score to </= 28% limited in order to demo improved functional mobility (Ongoing, Not met)  5. Pt will improve Koos Jr. Score by 15 pts or better in order to demo improved functional mobility. (Ongoing, Not met)  6. Pt will perform TUG in < 10 seconds without AD in order to demo improved gait speed. (Ongoing, Not met)  7. Pt will perform at least 15 sit to stands without UE support on 30 second sit to stand test in order to demo improved ability to perform transfers. (Ongoing, Not met)    PLAN     Plan of care Certification: 12/14/2021 to 2/8/22  Cont with treatment per POC.    Zeina Briseno, PTA

## 2022-01-11 ENCOUNTER — CLINICAL SUPPORT (OUTPATIENT)
Dept: REHABILITATION | Facility: HOSPITAL | Age: 47
End: 2022-01-11
Payer: MEDICAID

## 2022-01-11 DIAGNOSIS — R53.1 DECREASED STRENGTH, ENDURANCE, AND MOBILITY: ICD-10-CM

## 2022-01-11 DIAGNOSIS — Z74.09 DECREASED STRENGTH, ENDURANCE, AND MOBILITY: ICD-10-CM

## 2022-01-11 DIAGNOSIS — M25.661 DECREASED RANGE OF MOTION OF RIGHT KNEE: ICD-10-CM

## 2022-01-11 DIAGNOSIS — R68.89 DECREASED STRENGTH, ENDURANCE, AND MOBILITY: ICD-10-CM

## 2022-01-11 PROCEDURE — 97110 THERAPEUTIC EXERCISES: CPT | Mod: PN

## 2022-01-11 NOTE — PROGRESS NOTES
"[facility]   Physical Therapy Treatment Note     Name: Cristina MCKEON [facility] Number: [id number]    Therapy Diagnosis:   Encounter Diagnoses   Name Primary?    Decreased strength, endurance, and mobility     Decreased range of motion of right knee      Physician: Maryjane Harris PA-C    Visit Date: 1/11/2022    Physician Orders: PT Eval only and Treat   Medical Diagnosis from Referral: Z96.651 (ICD-10-CM) - S/P total knee arthroplasty, right  Evaluation Date: 12/14/2021  Authorization Period Expiration: 3/16/22  Plan of Care Expiration: 3/16/22  Visit # / Visits authorized: 7/8  FOTO: 8/10 (completed 1/4/22)  PTA Visit #: 0/5    Time In: 14:15  Time Out: 15:00  Total Billable Time: 45 minutes (3 TE - Medicaid)     Precautions: Standard, hx of DVT and PE     SUBJECTIVE     Pt reports: denies any complaints of pain today. Was able to walk downtown this morning. Returns to MD on 1/13/2022   She was partially compliant with home exercise program.  Response to previous treatment: minimal soreness  Functional change: Ongoing, walking without AD    Pain: 0/10 (0/10 stiffness)  Location: right back (occasionally) and along medial and lateral portions of the knee, but no retropatellar pain    OBJECTIVE     Objective Measures updated at progress report unless specified.     1/11/2022  AROM:   Right knee: 0-0-105  PROM:   Right knee: 0-0-108      Treatment     Cristina received the treatments listed below:      therapeutic exercises to develop strength, endurance, ROM, flexibility, posture and core stabilization for 45 minutes including (bulleted exercises only):  Nu Step: 5' lvl 1 NT  · Bike L1 4 min full fwd revolutions  Heel slides 15x  Quad sets: 5"x20  · Short arc quads: 3# 3x10; 3" holds  · Straight leg raise: 2x10 3" holds  · Long arc quads: 3#; 2x10 3" holds  · STS from hi lo mat (20in) with yellow TB: 3x10  · Lunges at 2nd step - x30  · Step ups - level 2 2x10 B fwd/lat  Standing TKE - " "Purple cord 3" x10   Heel raises 2x10    manual therapy techniques: Joint mobilizations, Myofacial release and Soft tissue Mobilization were applied to the: R knee for 00 minutes, including:  STM/MFR right knee  Scar tissue mobilization via cross friction massage and skin rolling  patellar mobs in all directions  Physiological knee flex/ext in sitting      Patient Education and Home Exercises     Home Exercises Provided and Patient Education Provided     Education provided:   · Symptom management and plan of care progressions.  · Home Exercise Program.    Written Home Exercises Provided: Patient instructed to cont prior HEP. Exercises were reviewed and Cristina was able to demonstrate them prior to the end of the session.  Cristina demonstrated good  understanding of the education provided. See EMR under Patient Instructions for exercises provided during therapy sessions    ASSESSMENT   Patient presents with no reports of knee pain or stiffness. Steady improvements in knee AROM and PROM. Issued medium heel lift for use in lef shoe secondary to reports of leg length discrepancy in weightbearing (due to windswept posture). Reported much more efficient gait and sit to stand transfers post heel lift placement. Progressed step up height with no difficulties but cues required for more efficient form. No exacerbation in symptoms upon completion of today's treatments.    Cristina Is progressing well towards her goals.   Pt prognosis is Good.   Pt will continue to benefit from skilled outpatient physical therapy to address the deficits listed in the problem list box on initial evaluation, provide pt/family education and to maximize pt's level of independence in the home and community environment.   Pt's spiritual, cultural and educational needs considered and pt agreeable to plan of care and goals.     Anticipated barriers to physical therapy: co-morbidities, lifestyle    Goals:  Short Term Goals:     1. Pt will be independent with HEP " supplement PT in improving functional mobility. (Ongoing, Not met)  2. Pt will improve R LE strength to at least 75% of L LE strength as measured via MicroFet handheld dynamometer in order to improve functional mobility (Ongoing, Not met)  3. Pt will improve R knee AROM to at least 100 deg in order to improve gait (Ongoing, Not met)     Long Term Goals:  1. Pt will be independent with updated HEP supplement PT in improving functional mobility. (Ongoing, Not met)  2. Pt will improve R LE strength to at least 90% of L LE strength as measured via MicroFet handheld dynamometer in order to improve functional mobility (Ongoing, Not met)  3. Pt will improve R knee AROM to at least 115 deg in order to improve gait and ability to perform ADLs (Ongoing, Not met)  4. Pt will improve FOTO knee survey score to </= 28% limited in order to demo improved functional mobility (Ongoing, Not met)  5. Pt will improve Koos Jr. Score by 15 pts or better in order to demo improved functional mobility. (Ongoing, Not met)  6. Pt will perform TUG in < 10 seconds without AD in order to demo improved gait speed. (Ongoing, Not met)  7. Pt will perform at least 15 sit to stands without UE support on 30 second sit to stand test in order to demo improved ability to perform transfers. (Ongoing, Not met)    PLAN     Plan of care Certification: 12/14/2021 to 2/8/22  Cont with treatment per POC.    Taurus Moura, PT

## 2022-01-13 ENCOUNTER — OFFICE VISIT (OUTPATIENT)
Dept: ORTHOPEDICS | Facility: CLINIC | Age: 47
End: 2022-01-13
Payer: MEDICAID

## 2022-01-13 ENCOUNTER — CLINICAL SUPPORT (OUTPATIENT)
Dept: REHABILITATION | Facility: HOSPITAL | Age: 47
End: 2022-01-13
Payer: MEDICAID

## 2022-01-13 VITALS — BODY MASS INDEX: 37.67 KG/M2 | WEIGHT: 234.38 LBS | HEIGHT: 66 IN

## 2022-01-13 DIAGNOSIS — Z74.09 DECREASED STRENGTH, ENDURANCE, AND MOBILITY: ICD-10-CM

## 2022-01-13 DIAGNOSIS — R53.1 DECREASED STRENGTH, ENDURANCE, AND MOBILITY: ICD-10-CM

## 2022-01-13 DIAGNOSIS — M25.661 DECREASED RANGE OF MOTION OF RIGHT KNEE: ICD-10-CM

## 2022-01-13 DIAGNOSIS — Z96.651 S/P TOTAL KNEE ARTHROPLASTY, RIGHT: Primary | ICD-10-CM

## 2022-01-13 DIAGNOSIS — R68.89 DECREASED STRENGTH, ENDURANCE, AND MOBILITY: ICD-10-CM

## 2022-01-13 PROCEDURE — 99999 PR PBB SHADOW E&M-EST. PATIENT-LVL III: ICD-10-PCS | Mod: PBBFAC,,, | Performed by: ORTHOPAEDIC SURGERY

## 2022-01-13 PROCEDURE — 3008F BODY MASS INDEX DOCD: CPT | Mod: CPTII,,, | Performed by: ORTHOPAEDIC SURGERY

## 2022-01-13 PROCEDURE — 99999 PR PBB SHADOW E&M-EST. PATIENT-LVL III: CPT | Mod: PBBFAC,,, | Performed by: ORTHOPAEDIC SURGERY

## 2022-01-13 PROCEDURE — 99024 PR POST-OP FOLLOW-UP VISIT: ICD-10-PCS | Mod: ,,, | Performed by: ORTHOPAEDIC SURGERY

## 2022-01-13 PROCEDURE — 1159F PR MEDICATION LIST DOCUMENTED IN MEDICAL RECORD: ICD-10-PCS | Mod: CPTII,,, | Performed by: ORTHOPAEDIC SURGERY

## 2022-01-13 PROCEDURE — 3008F PR BODY MASS INDEX (BMI) DOCUMENTED: ICD-10-PCS | Mod: CPTII,,, | Performed by: ORTHOPAEDIC SURGERY

## 2022-01-13 PROCEDURE — 1159F MED LIST DOCD IN RCRD: CPT | Mod: CPTII,,, | Performed by: ORTHOPAEDIC SURGERY

## 2022-01-13 PROCEDURE — 97110 THERAPEUTIC EXERCISES: CPT | Mod: PN

## 2022-01-13 PROCEDURE — 99213 OFFICE O/P EST LOW 20 MIN: CPT | Mod: PBBFAC,PN | Performed by: ORTHOPAEDIC SURGERY

## 2022-01-13 PROCEDURE — 99024 POSTOP FOLLOW-UP VISIT: CPT | Mod: ,,, | Performed by: ORTHOPAEDIC SURGERY

## 2022-01-13 NOTE — PROGRESS NOTES
"Subjective:      Patient ID: Cristina Sprague is a 46 y.o. female.    Chief Complaint: Post-op Evaluation (8 weeks s/p right TKA)      HPI: Cristina Sprague is here for a PO visit. She is 8 week s/p right TKA. She is currently in PT and reports doing very well. Pain is none. Ambulation is easy and independent. Notes lateal and incisional numbness. She does still feel like the left leg is a little longer, she has been using a heel lift with some benefit.    Past Medical History:   Diagnosis Date    Ectopic pregnancy 2014    History of blood clots     x 4-5?, all while pregnant    Morbid obesity     MTHFR mutation     Pulmonary embolism 2006    Obesity, OCPs       Current Outpatient Medications:     acetaminophen (TYLENOL ARTHRITIS ORAL), Take by mouth., Disp: , Rfl:     ELIQUIS 5 mg Tab, Take 5 mg by mouth 2 (two) times daily., Disp: , Rfl: 11    esomeprazole magnesium (NEXIUM ORAL), Nexium Take No date recorded No form recorded No frequency recorded No route recorded No set duration recorded No set duration amount recorded active No dosage strength recorded No dosage strength units of measure recorded, Disp: , Rfl:     fluticasone propionate (FLONASE) 50 mcg/actuation nasal spray, SPRAY 1 SPRAY IN EACH NOSTRIL ONCE DAILY, Disp: 16 mL, Rfl: 3    gabapentin (NEURONTIN) 300 MG capsule, Take 1 capsule (300 mg total) by mouth nightly., Disp: 30 capsule, Rfl: 1    multivitamin capsule, Take 1 capsule by mouth once daily., Disp: , Rfl:     oxyCODONE-acetaminophen (PERCOCET) 5-325 mg per tablet, Take 1 tablet by mouth every 6 (six) hours as needed for Pain. (Patient not taking: Reported on 1/13/2022), Disp: 45 tablet, Rfl: 0  Review of patient's allergies indicates:  No Known Allergies    Ht 5' 5.5" (1.664 m)   Wt 106.3 kg (234 lb 5.6 oz)   BMI 38.40 kg/m²     Review of Systems   Constitutional: Negative for chills and fever.   Cardiovascular: Negative for chest pain and palpitations.   Respiratory: Negative for " shortness of breath and wheezing.    Skin: Negative for poor wound healing and rash.   Gastrointestinal: Negative for nausea and vomiting.   Genitourinary: Negative for dysuria and hematuria.   Neurological: Positive for numbness (incisional/ lateal knee). Negative for seizures and tremors.   Psychiatric/Behavioral: Negative for altered mental status.   Allergic/Immunologic: Negative for environmental allergies and persistent infections.         Objective:    Ortho Exam         Right KNEE:  The patient walks with small limp.  The skin over the knee has a healed scar, non tender.  Knee effusion n/a.  Tendernes is located none.  Range of motion- Flexion 108 deg, Extension 0 deg,   Ligament exam:              MCL intact              Lachman intact              Post sag intact              LCL intact  Patellar crepitation absent.  Pulses DP present, PT present  Leg length clinically equal  Motor normal 5/5  strength in all tested muscle groups.   Sensory decreased lateral knee  GEN: Well developed, well nourished female. AAOX3. No acute distress.   Normocephalic, atraumatic.   BRIANA  Breathing unlabored.  Mood and affect appropriate.     Assessment:     Imaging: No new        1. S/P total knee arthroplasty, right          Plan:       Appropriate PO progress  Continue PT, not likely to need anymore after completion through Feburary  Continue scar massage  Discussed numbness and the natural improvement        Follow up in about 6 weeks (around 2/24/2022) for 3 mo PO visit.

## 2022-01-13 NOTE — PROGRESS NOTES
"[facility]   Physical Therapy Treatment Note     Name: Cristina MCKEON [place]  Clinic Number: [id number]    Therapy Diagnosis:   Encounter Diagnoses   Name Primary?    Decreased strength, endurance, and mobility     Decreased range of motion of right knee      Physician: Maryjane Harris PA-C    Visit Date: 1/13/2022    Physician Orders: PT Eval only and Treat   Medical Diagnosis from Referral: Z96.651 (ICD-10-CM) - S/P total knee arthroplasty, right  Evaluation Date: 12/14/2021  Authorization Period Expiration: 3/16/22  Plan of Care Expiration: 3/16/22  Visit # / Visits authorized: 8/8  FOTO: 9/10 (completed 1/4/22)  PTA Visit #: 0/5    Time In: 11:15  Time Out: 12:00  Total Billable Time: 45 minutes (3 TE - Medicaid)     Precautions: Standard, hx of DVT and PE     SUBJECTIVE     Pt reports: returned to MD this morning who is very pleased with her progress. She would like to continue physical therapy. Walking better with addition of heel lift at the last visit  She was partially compliant with home exercise program.  Response to previous treatment: No soreness  Functional change: improved ambulation since addition of heel lift    Pain: 0/10   Location: right medial knee    OBJECTIVE     1/13/2022  AROM:   Right knee: 0-0-100  PROM:   Right knee: 0-0-105    Right Knee extension = 4/5  Right knee flexion = 4+/5    TUG = 12 seconds (use of bilateral UE's on standard chair arm rests)    Treatment     Cristina received the treatments listed below:      therapeutic exercises to develop strength, endurance, ROM, flexibility, posture and core stabilization for 45 minutes including (bulleted exercises only):  Bike L1 4 min full fwd revolutions  Heel slides 15x  Quad sets: 5"x20  · Short arc quads: 3# 3x10; 3" holds  · Straight leg raise: 2x10 3" holds  · Long arc quads: 3#; 2x10 3" holds  · STS from hi lo mat (20in) with yellow TB: 3x10  · Lunges at 2nd step - x30  · Step ups - level 2 2x10 B " "fwd/lat  · Standing TKE - Purple cord 3" x10  · Balance -   Heel raises 2x10    manual therapy techniques: Joint mobilizations, Myofacial release and Soft tissue Mobilization were applied to the: R knee for 00 minutes, including:  STM/MFR right knee  Scar tissue mobilization via cross friction massage and skin rolling  patellar mobs in all directions  Physiological knee flex/ext in sitting    Patient Education and Home Exercises     Home Exercises Provided and Patient Education Provided     Education provided:   · Symptom management and plan of care progressions.  · Home Exercise Program.    Written Home Exercises Provided: Patient instructed to cont prior HEP. Exercises were reviewed and Cristina was able to demonstrate them prior to the end of the session.  Cristina demonstrated good  understanding of the education provided. See EMR under Patient Instructions for exercises provided during therapy sessions    ASSESSMENT   Cristina has met 20% of her documented goals as of the 9th visit and is progressing as expected towards her remaining goals. She requires ongoing Right lower extremity strength and proprioception changes to made her safe and effective within her home and community. She will benefit from skilled physical therapy for an additional 8 visits at 2 times per week for 4 weeks. Patient is in agreement with the plan.    Cristina Is progressing well towards her goals.   Pt prognosis is Good.   Pt will continue to benefit from skilled outpatient physical therapy to address the deficits listed in the problem list box on initial evaluation, provide pt/family education and to maximize pt's level of independence in the home and community environment.   Pt's spiritual, cultural and educational needs considered and pt agreeable to plan of care and goals.     Anticipated barriers to physical therapy: co-morbidities, lifestyle    Goals:  Short Term Goals:     1. Pt will be independent with HEP supplement PT in improving functional " mobility. (Met - 1/13/2022)  2. Pt will improve R LE strength to at least 75% of L LE strength as measured via MicroFet handheld dynamometer in order to improve functional mobility (Ongoing, Not met)  3. Pt will improve R knee AROM to at least 100 deg in order to improve gait (Met - 1/13/2022)     Long Term Goals:  1. Pt will be independent with updated HEP supplement PT in improving functional mobility. (Ongoing, Not met)  2. Pt will improve R LE strength to at least 90% of L LE strength as measured via MicroFet handheld dynamometer in order to improve functional mobility (Ongoing, Not met)  3. Pt will improve R knee AROM to at least 115 deg in order to improve gait and ability to perform ADLs (Ongoing, Not met)  4. Pt will improve FOTO knee survey score to </= 28% limited in order to demo improved functional mobility (Ongoing, Not met)  5. Pt will improve Koos Jr. Score by 15 pts or better in order to demo improved functional mobility. (Ongoing, Not met)  6. Pt will perform TUG in < 10 seconds without AD in order to demo improved gait speed. (Ongoing, Not met)  7. Pt will perform at least 15 sit to stands without UE support on 30 second sit to stand test in order to demo improved ability to perform transfers. (Ongoing, Not met)    PLAN     Plan of care Certification: 12/14/2021 to 2/8/22  Ongoing Right lower extremity strength and proprioception training. She will benefit from skilled physical therapy for an additional 8 visits at 2 times per week for 4 weeks.    Taurus Moura, PT, DPT, OCS

## 2022-01-24 ENCOUNTER — CLINICAL SUPPORT (OUTPATIENT)
Dept: REHABILITATION | Facility: HOSPITAL | Age: 47
End: 2022-01-24
Payer: MEDICAID

## 2022-01-24 DIAGNOSIS — Z74.09 DECREASED STRENGTH, ENDURANCE, AND MOBILITY: ICD-10-CM

## 2022-01-24 DIAGNOSIS — R53.1 DECREASED STRENGTH, ENDURANCE, AND MOBILITY: ICD-10-CM

## 2022-01-24 DIAGNOSIS — M25.661 DECREASED RANGE OF MOTION OF RIGHT KNEE: ICD-10-CM

## 2022-01-24 DIAGNOSIS — R68.89 DECREASED STRENGTH, ENDURANCE, AND MOBILITY: ICD-10-CM

## 2022-01-24 PROCEDURE — 97110 THERAPEUTIC EXERCISES: CPT | Mod: PN,CQ

## 2022-01-24 NOTE — PROGRESS NOTES
"[facility]   Physical Therapy Treatment Note     Name: Cristina Gironll  Clinic Number: [id number]    Therapy Diagnosis:   Encounter Diagnoses   Name Primary?    Decreased strength, endurance, and mobility     Decreased range of motion of right knee      Physician: Maryjane Harris PA-C    Visit Date: 1/24/2022    Physician Orders: PT Eval only and Treat   Medical Diagnosis from Referral: Z96.651 (ICD-10-CM) - S/P total knee arthroplasty, right  Evaluation Date: 12/14/2021  Authorization Period Expiration: 3/16/22  Plan of Care Expiration: 3/16/22  Visit # / Visits authorized: 1/8 (9 total)  FOTO: 10/10 (completed 1/4/22)  PTA Visit #: 1/5    Time In: 8:52  Time Out: 9:45  Total Billable Time: 53 minutes (4 TE - Medicaid)     Precautions: Standard, hx of DVT and PE     SUBJECTIVE     Pt reports: reports to therapy after feeling ill over the weekend. Reports that she was very fatigued and did not do much other than rest.  She was partially compliant with home exercise program.  Response to previous treatment: No soreness  Functional change: improved ambulation since addition of heel lift    Pain: 0/10   Location: right medial knee    OBJECTIVE     1/24/2022  AROM:   Right knee: 0-0-104  PROM:   Right knee: 0-0-108    Treatment     Cristina received the treatments listed below:      therapeutic exercises to develop strength, endurance, ROM, flexibility, posture and core stabilization for 43 minutes including (bulleted exercises only):  · Bike L1 4 min full fwd revolutions  Heel slides 15x  Quad sets: 5"x20  · Short arc quads: 3# 3x10; 3" holds  · Straight leg raise: 2x10 3" holds  · Long arc quads: 3#; 2x10 3" holds  · STS from hi lo mat (20in) with yellow TB: 3x10  · Lunges at 2nd step - x30  · Step ups - level 2 2x10 B fwd/lat  · Standing TKE - Purple cord 3" x10  Heel raises 2x10    manual therapy techniques: Joint mobilizations, Myofacial release and Soft tissue Mobilization were applied to " "the: R knee for 00 minutes, including:  STM/MFR right knee  Scar tissue mobilization via cross friction massage and skin rolling  patellar mobs in all directions  Physiological knee flex/ext in sitting    neuromuscular re-education activities to improve: Balance, Coordination, Kinesthetic, Sense and Proprioception for 10 minutes. The following activities were included:  Hurdles (6) Fwd/Lat x2 laps ea 1 UE support  Tandem stance 2x20" intermittent UE support  Tandem walking on firm x2 laps intermittent UE support      Patient Education and Home Exercises     Home Exercises Provided and Patient Education Provided     Education provided:   · Symptom management and plan of care progressions.  · Home Exercise Program.    Written Home Exercises Provided: Patient instructed to cont prior HEP. Exercises were reviewed and Cristina was able to demonstrate them prior to the end of the session.  Cristina demonstrated good  understanding of the education provided. See EMR under Patient Instructions for exercises provided during therapy sessions    ASSESSMENT   Cristina presents with minimal to no complaints of knee pain. Reports she was ill over the weekend and rested most of the time. Presents to clinic feeling better but still a bit fatigued. Implemented more balance activities this date with good tolerance but minimal to moderate UE support required to complete. Patient also demo's moderate L lateral trunk lean in order to clear RLE during forward hurdles. Much improved after verbal cuing but returned with last repetitions due to muscle fatigue. All other therex completed with good tolerance and minor cuing for form. Continue to progress strengthening and balance as appropriate.     Cristina Is progressing well towards her goals.   Pt prognosis is Good.   Pt will continue to benefit from skilled outpatient physical therapy to address the deficits listed in the problem list box on initial evaluation, provide pt/family education and to maximize " pt's level of independence in the home and community environment.   Pt's spiritual, cultural and educational needs considered and pt agreeable to plan of care and goals.     Anticipated barriers to physical therapy: co-morbidities, lifestyle    Goals:  Short Term Goals:     1. Pt will be independent with HEP supplement PT in improving functional mobility. (Met - 1/13/2022)  2. Pt will improve R LE strength to at least 75% of L LE strength as measured via MicroFet handheld dynamometer in order to improve functional mobility (Ongoing, Not met)  3. Pt will improve R knee AROM to at least 100 deg in order to improve gait (Met - 1/13/2022)     Long Term Goals:  1. Pt will be independent with updated HEP supplement PT in improving functional mobility. (Ongoing, Not met)  2. Pt will improve R LE strength to at least 90% of L LE strength as measured via MicroFet handheld dynamometer in order to improve functional mobility (Ongoing, Not met)  3. Pt will improve R knee AROM to at least 115 deg in order to improve gait and ability to perform ADLs (Ongoing, Not met)  4. Pt will improve FOTO knee survey score to </= 28% limited in order to demo improved functional mobility (Ongoing, Not met)  5. Pt will improve Koos Jr. Score by 15 pts or better in order to demo improved functional mobility. (Ongoing, Not met)  6. Pt will perform TUG in < 10 seconds without AD in order to demo improved gait speed. (Ongoing, Not met)  7. Pt will perform at least 15 sit to stands without UE support on 30 second sit to stand test in order to demo improved ability to perform transfers. (Ongoing, Not met)    PLAN     Plan of care Certification: 12/14/2021 to 2/8/22  Ongoing Right lower extremity strength and proprioception training. She will benefit from skilled physical therapy for an additional 8 visits at 2 times per week for 4 weeks.    Zeina Briseno, PTA

## 2022-01-27 ENCOUNTER — DOCUMENTATION ONLY (OUTPATIENT)
Dept: REHABILITATION | Facility: HOSPITAL | Age: 47
End: 2022-01-27
Payer: MEDICAID

## 2022-01-27 NOTE — PROGRESS NOTES
Physical therapist and physical therapy assistant(s) met face to face on 1/25/22 to discuss patient's treatment plan and progress towards established goals. Pt will be seen by a physical therapist minimally every 6th visit or every 30 days.    MICHAEL STANTON, PT, DPT    Zeina Briseno, PTA

## 2022-01-31 ENCOUNTER — CLINICAL SUPPORT (OUTPATIENT)
Dept: REHABILITATION | Facility: HOSPITAL | Age: 47
End: 2022-01-31
Payer: MEDICAID

## 2022-01-31 DIAGNOSIS — M25.661 DECREASED RANGE OF MOTION OF RIGHT KNEE: ICD-10-CM

## 2022-01-31 DIAGNOSIS — Z74.09 DECREASED STRENGTH, ENDURANCE, AND MOBILITY: ICD-10-CM

## 2022-01-31 DIAGNOSIS — R68.89 DECREASED STRENGTH, ENDURANCE, AND MOBILITY: ICD-10-CM

## 2022-01-31 DIAGNOSIS — R53.1 DECREASED STRENGTH, ENDURANCE, AND MOBILITY: ICD-10-CM

## 2022-01-31 PROCEDURE — 97110 THERAPEUTIC EXERCISES: CPT | Mod: PN,CQ

## 2022-01-31 NOTE — PROGRESS NOTES
"[facility]   Physical Therapy Treatment Note     Name: Cristina MCKEON [place]  Clinic Number: [id number]    Therapy Diagnosis:   Encounter Diagnoses   Name Primary?    Decreased strength, endurance, and mobility     Decreased range of motion of right knee      Physician: Maryjane Harris PA-C    Visit Date: 1/31/2022    Physician Orders: PT Eval only and Treat   Medical Diagnosis from Referral: Z96.651 (ICD-10-CM) - S/P total knee arthroplasty, right  Evaluation Date: 12/14/2021  Authorization Period Expiration: 3/16/22  Plan of Care Expiration: 3/16/22  Visit # / Visits authorized: 2/8 (9 total)  FOTO: 11/10 (completed 1/4/22)  PTA Visit #: 2/5    Time In: 8:20  Time Out: 9:00  Total Billable Time: 40 minutes (3 TE - Medicaid)     Precautions: Standard, hx of DVT and PE     SUBJECTIVE     Pt reports: Minimal pain in knee reduced to one spot on lateral knee. Notes that she was able to navigate steps at a restaurant with reciprocal pattern using handrail.  She was partially compliant with home exercise program.  Response to previous treatment: No soreness  Functional change: improved stair/step navigation    Pain: 0/10   Location: right medial knee    OBJECTIVE     1/24/2022  AROM:   Right knee: 0-0-104  PROM:   Right knee: 0-0-108    Treatment     Cristina received the treatments listed below:      therapeutic exercises to develop strength, endurance, ROM, flexibility, posture and core stabilization for 40 minutes including (bulleted exercises only):  · Bike L1 4 min full fwd revolutions  Heel slides 15x  Quad sets: 5"x20  · Short arc quads: 3# 3x10; 3" holds  · Straight leg raise: 2x10 3" holds  · Long arc quads: 3#; 3x10 3" holds  · STS from hi lo mat (20in) with yellow TB: 3x10  · Lunges at 2nd step - x30  · Step ups - level 2 2x10 B fwd/lat  · Standing TKE - Purple cord 3" x10  · Leg Press DL 5 plates 2x10; SL 3 plates x15  Heel raises 2x10    manual therapy techniques: Joint mobilizations, " "Myofacial release and Soft tissue Mobilization were applied to the: R knee for 00 minutes, including:  STM/MFR right knee  Scar tissue mobilization via cross friction massage and skin rolling  patellar mobs in all directions  Physiological knee flex/ext in sitting    neuromuscular re-education activities to improve: Balance, Coordination, Kinesthetic, Sense and Proprioception for 00 minutes. The following activities were included:  Hurdles (6) Fwd/Lat x2 laps ea 1 UE support  Tandem stance 2x20" intermittent UE support  Tandem walking on firm x2 laps intermittent UE support      Patient Education and Home Exercises     Home Exercises Provided and Patient Education Provided     Education provided:   · Symptom management and plan of care progressions.  · Home Exercise Program.    Written Home Exercises Provided: Patient instructed to cont prior HEP. Exercises were reviewed and Cristina was able to demonstrate them prior to the end of the session.  Cristina demonstrated good  understanding of the education provided. See EMR under Patient Instructions for exercises provided during therapy sessions    ASSESSMENT   Cristina presents with minimal to no complaints of knee pain. Pain if present is reduced to mainly one spot on lateral knee. Improvement in stair navigation evidenced by ability to step up in reciprocal pattern at restaurant with handrail. Focused on LE strengthening this date. Implemented leg press today with good tolerance and appropriate fatigue. Continue to progress strengthening and balance as appropriate.     Cristina Is progressing well towards her goals.   Pt prognosis is Good.   Pt will continue to benefit from skilled outpatient physical therapy to address the deficits listed in the problem list box on initial evaluation, provide pt/family education and to maximize pt's level of independence in the home and community environment.   Pt's spiritual, cultural and educational needs considered and pt agreeable to plan of care " and goals.     Anticipated barriers to physical therapy: co-morbidities, lifestyle    Goals:  Short Term Goals:     1. Pt will be independent with HEP supplement PT in improving functional mobility. (Met - 1/13/2022)  2. Pt will improve R LE strength to at least 75% of L LE strength as measured via MicroFet handheld dynamometer in order to improve functional mobility (Ongoing, Not met)  3. Pt will improve R knee AROM to at least 100 deg in order to improve gait (Met - 1/13/2022)     Long Term Goals:  1. Pt will be independent with updated HEP supplement PT in improving functional mobility. (Ongoing, Not met)  2. Pt will improve R LE strength to at least 90% of L LE strength as measured via MicroFet handheld dynamometer in order to improve functional mobility (Ongoing, Not met)  3. Pt will improve R knee AROM to at least 115 deg in order to improve gait and ability to perform ADLs (Ongoing, Not met)  4. Pt will improve FOTO knee survey score to </= 28% limited in order to demo improved functional mobility (Ongoing, Not met)  5. Pt will improve Koos Jr. Score by 15 pts or better in order to demo improved functional mobility. (Ongoing, Not met)  6. Pt will perform TUG in < 10 seconds without AD in order to demo improved gait speed. (Ongoing, Not met)  7. Pt will perform at least 15 sit to stands without UE support on 30 second sit to stand test in order to demo improved ability to perform transfers. (Ongoing, Not met)    PLAN     Plan of care Certification: 12/14/2021 to 2/8/22  Ongoing Right lower extremity strength and proprioception training. She will benefit from skilled physical therapy for an additional 8 visits at 2 times per week for 4 weeks.    Zeina Briseno, PTA

## 2022-02-02 ENCOUNTER — CLINICAL SUPPORT (OUTPATIENT)
Dept: REHABILITATION | Facility: HOSPITAL | Age: 47
End: 2022-02-02
Payer: MEDICAID

## 2022-02-02 DIAGNOSIS — M25.661 DECREASED RANGE OF MOTION OF RIGHT KNEE: ICD-10-CM

## 2022-02-02 DIAGNOSIS — Z74.09 DECREASED STRENGTH, ENDURANCE, AND MOBILITY: ICD-10-CM

## 2022-02-02 DIAGNOSIS — R53.1 DECREASED STRENGTH, ENDURANCE, AND MOBILITY: ICD-10-CM

## 2022-02-02 DIAGNOSIS — R68.89 DECREASED STRENGTH, ENDURANCE, AND MOBILITY: ICD-10-CM

## 2022-02-02 PROCEDURE — 97110 THERAPEUTIC EXERCISES: CPT | Mod: PN

## 2022-02-02 NOTE — PROGRESS NOTES
"[facility]   Physical Therapy Treatment Note     Name: Cristina Sprague  Clinic Number: [id number]    Therapy Diagnosis:   Encounter Diagnoses   Name Primary?    Decreased strength, endurance, and mobility     Decreased range of motion of right knee      Physician: Maryjane Harris PA-C    Visit Date: 2/2/2022    Physician Orders: PT Eval only and Treat   Medical Diagnosis from Referral: Z96.651 (ICD-10-CM) - S/P total knee arthroplasty, right  Evaluation Date: 12/14/2021  Authorization Period Expiration: 3/16/22  Plan of Care Expiration: 3/16/22  Visit # / Visits authorized: 3/8 (10 total)  FOTO: 1/10 (completed 2/2/2022)  PTA Visit #: 0/5    Time In: 10:00AM  Time Out: 10:45AM  Total Billable Time: 45 minutes (3 TE - Medicaid)     Precautions: Standard, hx of DVT and PE     SUBJECTIVE     Pt reports:  She has been experiencing muscular cramping that starts at her medial knee and can travel into her lumbar spine, but only presents at night and is not every night. Last night she had a rough night, but notes if she places the RLE in more hip internal rotation, her pain is alleviated. She is very tired today.  She was partially compliant with home exercise program.  Response to previous treatment: No soreness  Functional change: improved stair/step navigation    Pain: 0/10   Location: right medial knee    OBJECTIVE     Impairment/Limitation/Restriction for KOOS, Functional, Daily Living     Therapist reviewed KOOS scores for Cristina Sprague on 2/2/2022.   KOOS documents entered into [employer] - see Media section.  Score:79.7     Limitation for FOTO Knee Survey  Limitation Score: 26%     Treatment     Cristina received the treatments listed below:      therapeutic exercises to develop strength, endurance, ROM, flexibility, posture and core stabilization for 35 minutes including (bulleted exercises only):  · Bike L1 4 min full fwd revolutions  · Short arc quads: 3# 3x10; 3" holds  · Straight leg raise: " "2x10 3" holds  · Long arc quads: 3#; 3x10 3" holds  · Lunges at 2nd step - x30  · Step ups - level 2 2x10 B fwd/lat  · Leg Press DL 5 plates 2x10; SL 3 plates x15  · FOTO Survey (see objective)    NOT TODAY:  Standing TKE - Purple cord 3" x10  STS from hi lo mat (20in) with yellow TB: 3x10  Heel raises 2x10  Heel slides 15x  Quad sets: 5"x20    manual therapy techniques: Joint mobilizations, Myofacial release and Soft tissue Mobilization were applied to the: R knee for 10 minutes, including (bulleted only):  · STM/MFR right adductor for pain relief and decreased muscle TrP and spasms  · PROM adductor stretch to end range    Scar tissue mobilization via cross friction massage and skin rolling  patellar mobs in all directions  Physiological knee flex/ext in sitting    neuromuscular re-education activities to improve: Balance, Coordination, Kinesthetic, Sense and Proprioception for 00 minutes. The following activities were included:  Hurdles (6) Fwd/Lat x2 laps ea 1 UE support  Tandem stance 2x20" intermittent UE support  Tandem walking on firm x2 laps intermittent UE support      Patient Education and Home Exercises     Home Exercises Provided and Patient Education Provided     Education provided:   · Symptom management and plan of care progressions.  · Home Exercise Program.  · Proper mechanics of ambulation and interventions    Written Home Exercises Provided: Patient instructed to cont prior HEP. Exercises were reviewed and Cristina was able to demonstrate them prior to the end of the session.  Cristina demonstrated good  understanding of the education provided. See EMR under Patient Instructions for exercises provided during therapy sessions    ASSESSMENT     Cristina presents to clinic with no complaints of knee pain, but endorses experiencing medial thigh cramping for the last week. Through palpation and examination, PT was able to identify myofascial TrP along the adductor that corresponded with patient's familiar symptoms. " Manual therapy was able to alleviate the symptoms. Session is focused on quality of interventions along with LE strength and proper mechanics to minimize cramping and discomfort at night. Patient continues to present with genu varum in WB activities along with gait. She was appropriately fatigued by end of session. Continue to progress strengthening and balance as appropriate.    Cristina Is progressing well towards her goals.   Pt prognosis is Good.   Pt will continue to benefit from skilled outpatient physical therapy to address the deficits listed in the problem list box on initial evaluation, provide pt/family education and to maximize pt's level of independence in the home and community environment.   Pt's spiritual, cultural and educational needs considered and pt agreeable to plan of care and goals.     Anticipated barriers to physical therapy: co-morbidities, lifestyle    Goals:  Short Term Goals:  1. Pt will be independent with HEP supplement PT in improving functional mobility. (Met - 1/13/2022)  2. Pt will improve R LE strength to at least 75% of L LE strength as measured via MicroFet handheld dynamometer in order to improve functional mobility (Ongoing, Not met)  3. Pt will improve R knee AROM to at least 100 deg in order to improve gait (Met - 1/13/2022)     Long Term Goals:  1. Pt will be independent with updated HEP supplement PT in improving functional mobility. (Ongoing, Not met)  2. Pt will improve R LE strength to at least 90% of L LE strength as measured via MicroFet handheld dynamometer in order to improve functional mobility (Ongoing, Not met)  3. Pt will improve R knee AROM to at least 115 deg in order to improve gait and ability to perform ADLs (Ongoing, Not met)  4. Pt will improve FOTO knee survey score to </= 28% limited in order to demo improved functional mobility (MET - 2/2/2022)  5. Pt will improve Koos Jr. Score by 15 pts or better in order to demo improved functional mobility. (Ongoing,  Not met)  6. Pt will perform TUG in < 10 seconds without AD in order to demo improved gait speed. (Ongoing, Not met)  7. Pt will perform at least 15 sit to stands without UE support on 30 second sit to stand test in order to demo improved ability to perform transfers. (Ongoing, Not met)    PLAN     Plan of care Certification: 12/14/2021 to 3/16/22  Ongoing Right lower extremity strength and proprioception training. She will benefit from skilled physical therapy for an additional 8 visits at 2 times per week for 4 weeks.    Holly Vazquez, SPT

## 2022-02-07 ENCOUNTER — CLINICAL SUPPORT (OUTPATIENT)
Dept: REHABILITATION | Facility: HOSPITAL | Age: 47
End: 2022-02-07
Payer: MEDICAID

## 2022-02-07 DIAGNOSIS — R53.1 DECREASED STRENGTH, ENDURANCE, AND MOBILITY: ICD-10-CM

## 2022-02-07 DIAGNOSIS — M25.661 DECREASED RANGE OF MOTION OF RIGHT KNEE: ICD-10-CM

## 2022-02-07 DIAGNOSIS — Z74.09 DECREASED STRENGTH, ENDURANCE, AND MOBILITY: ICD-10-CM

## 2022-02-07 DIAGNOSIS — R68.89 DECREASED STRENGTH, ENDURANCE, AND MOBILITY: ICD-10-CM

## 2022-02-07 PROCEDURE — 97110 THERAPEUTIC EXERCISES: CPT | Mod: PN

## 2022-02-07 NOTE — PROGRESS NOTES
"[facility]   Physical Therapy Treatment Note     Name: Cristina MCKEON [facility] Number: [id number]    Therapy Diagnosis:   Encounter Diagnoses   Name Primary?    Decreased strength, endurance, and mobility     Decreased range of motion of right knee      Physician: Maryjane Harris PA-C    Visit Date: 2/7/2022    Physician Orders: PT Eval only and Treat   Medical Diagnosis from Referral: Z96.651 (ICD-10-CM) - S/P total knee arthroplasty, right  Evaluation Date: 12/14/2021  Authorization Period Expiration: 3/16/22  Plan of Care Expiration: 3/16/22  Visit # / Visits authorized: 4/8 (11 total)  FOTO: 1/10 (completed 2/2/2022)  PTA Visit #: 0/5    Time In: 9:17AM  Time Out: 10:00AM  Total Billable Time: 43 minutes (3 TE - Medicaid)     Precautions: Standard, hx of DVT and PE     SUBJECTIVE     Pt reports: experienced medial knee pain last night, which disrupted her sleep. She notes she was very busy over the weekend, and that might have been the cause.  She was partially compliant with home exercise program.  Response to previous treatment: No soreness  Functional change: ongoing    Pain: 4/10 (described as tender and tight)  Location: right medial knee    OBJECTIVE     Objective Measures updated at progress report unless specified.    Treatment     Cristina received the treatments listed below:      therapeutic exercises to develop strength, endurance, ROM, flexibility, posture and core stabilization for 23 minutes including (bulleted exercises only):  · Bridges 2 x 10 - with cueing for GS and WB through heels  · Leg Press DL 5 plates 2x10; SL 3 plates x 15   · DL weight shift with YTB (pulling in lateral direction) - 2 x 10    Lunges at 2nd step - x30 >> reintroduce next session  Step ups - level 2 2x10 B fwd/lat >> reintroduce next session  Standing TKE - Purple cord 3" x10 > reintroduce next session    NOT TODAY:  Bike L1 4 min full fwd revolutions  STS from hi lo mat (20in) with yellow TB: " "3x10  Heel raises 2x10  Heel slides 15x  Long arc quads: 3#; 3x10 3" holds >> D/C to HEP  Short arc quads: 3# 3x10; 3" holds >> D/C to HEP  Straight leg raise: 2x10 3" holds >> D/C to HEP  Quad sets: 5"x20 >> D/C to HEP    Therapeutic activities to improve functional performance for 10 minutes, including (bulleted only):  · Insertion of medial post to R shoe for improved weight bearing with assessment pre and post    neuromuscular re-education activities to improve: Balance, Coordination, Kinesthetic, Sense and Proprioception for 10 minutes. The following activities were included (bulleted only):  · Tandem stance 3 x 30" with intermittent UE support (RLE back)  · SLS with YTB around thigh (pulling in lateral direction) for improved proprioception of WB through medial foot     Hurdles (6) Fwd/Lat x2 laps ea 1 UE support  Tandem walking on firm x2 laps intermittent UE support    manual therapy techniques: Joint mobilizations, Myofacial release and Soft tissue Mobilization were applied to the: R knee for 00 minutes, including (bulleted only):  · STM/MFR right adductor for pain relief and decreased muscle TrP and spasms  · PROM adductor stretch to end range    Scar tissue mobilization via cross friction massage and skin rolling  patellar mobs in all directions  Physiological knee flex/ext in sitting      Patient Education and Home Exercises     Home Exercises Provided and Patient Education Provided     Education provided:   · Symptom management and plan of care progressions.  · Home Exercise Program.  · Proper mechanics of ambulation and interventions    Written Home Exercises Provided: Patient instructed to cont prior HEP. Exercises were reviewed and Cristina was able to demonstrate them prior to the end of the session.  Cristina demonstrated good  understanding of the education provided. See EMR under Patient Instructions for exercises provided during therapy sessions    ASSESSMENT     Cristina presents to clinic with medial knee " pain, 4/10, with difficulty sleeping last night due to her pain. She did not have reports of medial thigh cramping. Patient was appropriate for trial of ambulating with medial post of RLE in order to improve weight bearing and decrease forces experienced by the R knee. She was educated on use of medial post and when/if she should remove the post. She was agreeable to using insert. Session was then focused on improving gluteal strengthening and improving proper WB through RLE. Monitor response to changes in treatment today and progress as appropriate.    Cristina Is progressing well towards her goals.   Pt prognosis is Good.   Pt will continue to benefit from skilled outpatient physical therapy to address the deficits listed in the problem list box on initial evaluation, provide pt/family education and to maximize pt's level of independence in the home and community environment.   Pt's spiritual, cultural and educational needs considered and pt agreeable to plan of care and goals.     Anticipated barriers to physical therapy: co-morbidities, lifestyle    Goals:  Short Term Goals:  1. Pt will be independent with HEP supplement PT in improving functional mobility. (Met - 1/13/2022)  2. Pt will improve R LE strength to at least 75% of L LE strength as measured via MicroFet handheld dynamometer in order to improve functional mobility (Ongoing, Not met)  3. Pt will improve R knee AROM to at least 100 deg in order to improve gait (Met - 1/13/2022)     Long Term Goals:  1. Pt will be independent with updated HEP supplement PT in improving functional mobility. (Ongoing, Not met)  2. Pt will improve R LE strength to at least 90% of L LE strength as measured via MicroFet handheld dynamometer in order to improve functional mobility (Ongoing, Not met)  3. Pt will improve R knee AROM to at least 115 deg in order to improve gait and ability to perform ADLs (Ongoing, Not met)  4. Pt will improve FOTO knee survey score to </= 28% limited  in order to demo improved functional mobility (MET - 2/2/2022)  5. Pt will improve Koos Jr. Score by 15 pts or better in order to demo improved functional mobility. (Ongoing, Not met)  6. Pt will perform TUG in < 10 seconds without AD in order to demo improved gait speed. (Ongoing, Not met)  7. Pt will perform at least 15 sit to stands without UE support on 30 second sit to stand test in order to demo improved ability to perform transfers. (Ongoing, Not met)    PLAN     Plan of care Certification: 12/14/2021 to 3/16/22  Ongoing Right lower extremity strength and proprioception training. She will benefit from skilled physical therapy for an additional 8 visits at 2 times per week for 4 weeks.    Holly Vazquez, SPT

## 2022-02-11 ENCOUNTER — CLINICAL SUPPORT (OUTPATIENT)
Dept: REHABILITATION | Facility: HOSPITAL | Age: 47
End: 2022-02-11
Payer: MEDICAID

## 2022-02-11 DIAGNOSIS — Z74.09 DECREASED STRENGTH, ENDURANCE, AND MOBILITY: ICD-10-CM

## 2022-02-11 DIAGNOSIS — R53.1 DECREASED STRENGTH, ENDURANCE, AND MOBILITY: ICD-10-CM

## 2022-02-11 DIAGNOSIS — R68.89 DECREASED STRENGTH, ENDURANCE, AND MOBILITY: ICD-10-CM

## 2022-02-11 DIAGNOSIS — M25.661 DECREASED RANGE OF MOTION OF RIGHT KNEE: ICD-10-CM

## 2022-02-11 PROCEDURE — 97110 THERAPEUTIC EXERCISES: CPT | Mod: PN

## 2022-02-11 NOTE — PROGRESS NOTES
[facility]   Physical Therapy Treatment Note     Name: Cristina MCKEON [facility] Number: [id number]    Therapy Diagnosis:   Encounter Diagnoses   Name Primary?    Decreased strength, endurance, and mobility     Decreased range of motion of right knee      Physician: Maryjane Harris PA-C    Visit Date: 2/11/2022    Physician Orders: PT Eval only and Treat   Medical Diagnosis from Referral: Z96.651 (ICD-10-CM) - S/P total knee arthroplasty, right  Evaluation Date: 12/14/2021  Authorization Period Expiration: 3/16/22  Plan of Care Expiration: 3/16/22  Visit # / Visits authorized: 5/8 (13 total)  FOTO: 1/10 (completed 2/2/2022)  PTA Visit #: 0/5    Time In: 8:35 AM  Time Out: 9:16 AM  Total Billable Time: 41 minutes (3 TE - Medicaid)     Precautions: Standard, hx of DVT and PE     SUBJECTIVE     Pt reports: her whole leg from ankle up was bothering her following last session. It was some of the worst pain she's had and considered calling the doctor. It was achey and heavy. She took naproxen last night with relief today. She did not like the medial post and felt like it was causing her to roll her ankle so took it out.   She was partially compliant with home exercise program.  Response to previous treatment: a lot of  soreness  Functional change: ongoing    Pain: 2/10 (described as tender and tight)  Location: right medial knee    OBJECTIVE     Objective Measures updated at progress report unless specified.    Treatment     Cristina received the treatments listed below:      therapeutic exercises to develop strength, endurance, ROM, flexibility, posture and core stabilization for 41 minutes including (bulleted exercises only):  · Bike L1 4 min full fwd revolutions  · Bridges 2 x 10 - with cueing for GS and WB through heels  · Leg Press DL 5 plates 2x10; SL 3 plates x 15   · DL weight shift with YTB (pulling in lateral direction) - 2 x 10   · cybex knee flexion 3 plates DL 2x10  · Lunges at 2nd  "step - x30   · Step ups - stair 2 2x10 B fwd/lat L2  · Standing TKE - Purple cord 3" x10     NOT TODAY:  STS from hi lo mat (20in) with yellow TB: 3x10      Therapeutic activities to improve functional performance for 00 minutes, including (bulleted only):  Insertion of medial post to R shoe for improved weight bearing with assessment pre and post    neuromuscular re-education activities to improve: Balance, Coordination, Kinesthetic, Sense and Proprioception for 00 minutes. The following activities were included (bulleted only):  Tandem stance 3 x 30" with intermittent UE support (RLE back)  SLS with YTB around thigh (pulling in lateral direction) for improved proprioception of WB through medial foot   Hurdles (6) Fwd/Lat x2 laps ea 1 UE support  Tandem walking on firm x2 laps intermittent UE support    manual therapy techniques: Joint mobilizations, Myofacial release and Soft tissue Mobilization were applied to the: R knee for 00 minutes, including (bulleted only):  · STM/MFR right adductor for pain relief and decreased muscle TrP and spasms  · PROM adductor stretch to end range    Scar tissue mobilization via cross friction massage and skin rolling  patellar mobs in all directions  Physiological knee flex/ext in sitting      Patient Education and Home Exercises     Home Exercises Provided and Patient Education Provided     Education provided:   · Symptom management and plan of care progressions.  · Home Exercise Program.  · Proper mechanics of ambulation and interventions    Written Home Exercises Provided: Patient instructed to cont prior HEP. Exercises were reviewed and Cristina was able to demonstrate them prior to the end of the session.  Cristina demonstrated good  understanding of the education provided. See EMR under Patient Instructions for exercises provided during therapy sessions    ASSESSMENT     Cristina presents to clinic following exacerbation of knee pain since previous visit which has since returned to " baseline. Pt uncomfortable with use of medial post and removed from her shoe. Pt with tenderness palpated at medial knee. Continued with treatment focused on knee and gluteal strengthening. Able to resume previous activities today and no pain with new exercises. Pt did report medial knee pain with TKE, cued to stop just prior to end range with resolution of sx while continuing to feel appropriate quad activation. Pt with 3 remaining visits and is interested in spacing visits out working towards transition to HEP over next 2 weeks. Scheduled to return to MD 3/11/22.     Cristina Is progressing well towards her goals.   Pt prognosis is Good.   Pt will continue to benefit from skilled outpatient physical therapy to address the deficits listed in the problem list box on initial evaluation, provide pt/family education and to maximize pt's level of independence in the home and community environment.   Pt's spiritual, cultural and educational needs considered and pt agreeable to plan of care and goals.     Anticipated barriers to physical therapy: co-morbidities, lifestyle    Goals:  Short Term Goals:  1. Pt will be independent with HEP supplement PT in improving functional mobility. (Met - 1/13/2022)  2. Pt will improve R LE strength to at least 75% of L LE strength as measured via MicroFet handheld dynamometer in order to improve functional mobility (Ongoing, Not met)  3. Pt will improve R knee AROM to at least 100 deg in order to improve gait (Met - 1/13/2022)     Long Term Goals:  1. Pt will be independent with updated HEP supplement PT in improving functional mobility. (Ongoing, Not met)  2. Pt will improve R LE strength to at least 90% of L LE strength as measured via MicroFet handheld dynamometer in order to improve functional mobility (Ongoing, Not met)  3. Pt will improve R knee AROM to at least 115 deg in order to improve gait and ability to perform ADLs (Ongoing, Not met)  4. Pt will improve FOTO knee survey score to  </= 28% limited in order to demo improved functional mobility (MET - 2/2/2022)  5. Pt will improve Koos Jr. Score by 15 pts or better in order to demo improved functional mobility. (Ongoing, Not met)  6. Pt will perform TUG in < 10 seconds without AD in order to demo improved gait speed. (Ongoing, Not met)  7. Pt will perform at least 15 sit to stands without UE support on 30 second sit to stand test in order to demo improved ability to perform transfers. (Ongoing, Not met)    PLAN     Plan of care Certification: 12/14/2021 to 3/16/22  Ongoing Right lower extremity strength and proprioception training.    Tentative d/c 2/25/22. Will reduce frequency to 1-2x/week for 2 remaining weeks of scheduled visits.    MICHAEL STANTON, PT

## 2022-02-14 ENCOUNTER — TELEPHONE (OUTPATIENT)
Dept: OBSTETRICS AND GYNECOLOGY | Facility: CLINIC | Age: 47
End: 2022-02-14
Payer: MEDICAID

## 2022-02-14 DIAGNOSIS — Z30.9 ENCOUNTER FOR CONTRACEPTIVE MANAGEMENT, UNSPECIFIED TYPE: Primary | ICD-10-CM

## 2022-02-14 NOTE — TELEPHONE ENCOUNTER
Placed order for patients mirena due to the referral being outdated and patient scheduled this week for insertion.

## 2022-02-18 ENCOUNTER — PROCEDURE VISIT (OUTPATIENT)
Dept: OBSTETRICS AND GYNECOLOGY | Facility: CLINIC | Age: 47
End: 2022-02-18
Payer: MEDICAID

## 2022-02-18 VITALS
HEIGHT: 65 IN | SYSTOLIC BLOOD PRESSURE: 104 MMHG | DIASTOLIC BLOOD PRESSURE: 72 MMHG | BODY MASS INDEX: 37.39 KG/M2 | WEIGHT: 224.44 LBS

## 2022-02-18 DIAGNOSIS — Z32.02 NEGATIVE PREGNANCY TEST: Primary | ICD-10-CM

## 2022-02-18 DIAGNOSIS — N76.0 ACUTE VAGINITIS: ICD-10-CM

## 2022-02-18 DIAGNOSIS — N93.9 ABNORMAL UTERINE BLEEDING (AUB): ICD-10-CM

## 2022-02-18 LAB
B-HCG UR QL: NEGATIVE
CTP QC/QA: YES

## 2022-02-18 PROCEDURE — 87491 CHLMYD TRACH DNA AMP PROBE: CPT | Performed by: OBSTETRICS & GYNECOLOGY

## 2022-02-18 PROCEDURE — 87481 CANDIDA DNA AMP PROBE: CPT | Mod: 59 | Performed by: OBSTETRICS & GYNECOLOGY

## 2022-02-18 PROCEDURE — 88305 TISSUE EXAM BY PATHOLOGIST: CPT | Performed by: PATHOLOGY

## 2022-02-18 PROCEDURE — 87591 N.GONORRHOEAE DNA AMP PROB: CPT | Mod: 59 | Performed by: OBSTETRICS & GYNECOLOGY

## 2022-02-18 PROCEDURE — 88305 TISSUE EXAM BY PATHOLOGIST: ICD-10-PCS | Mod: 26,,, | Performed by: PATHOLOGY

## 2022-02-18 PROCEDURE — 88305 TISSUE EXAM BY PATHOLOGIST: CPT | Mod: 26,,, | Performed by: PATHOLOGY

## 2022-02-18 PROCEDURE — 58100 BIOPSY OF UTERUS LINING: CPT | Mod: PBBFAC | Performed by: OBSTETRICS & GYNECOLOGY

## 2022-02-18 PROCEDURE — 81025 URINE PREGNANCY TEST: CPT | Mod: PBBFAC | Performed by: OBSTETRICS & GYNECOLOGY

## 2022-02-18 PROCEDURE — 58100 BIOPSY (GYNECOLOGICAL): ICD-10-PCS | Mod: S$PBB,,, | Performed by: OBSTETRICS & GYNECOLOGY

## 2022-02-18 PROCEDURE — 99499 NO LOS: ICD-10-PCS | Mod: S$PBB,,, | Performed by: OBSTETRICS & GYNECOLOGY

## 2022-02-18 PROCEDURE — 99499 UNLISTED E&M SERVICE: CPT | Mod: S$PBB,,, | Performed by: OBSTETRICS & GYNECOLOGY

## 2022-02-18 NOTE — PROCEDURES
"Biopsy (Gynecological)    Date/Time: 2022 11:00 AM  Performed by: Delma Roach MD  Authorized by: Delma Roach MD     Consent Done?:  Yes (Written)  Local anesthesia used?: No      Biopsy Location:  Uterus  Estimated blood loss (cc):  0   Patient tolerated the procedure well with no immediate complications.     Chief Complaint: Abnormal Uterine Bleeding    Subjective:     Cristina Sprague is a 46 y.o.  who presents today for endometrial biopsy.  Patient's last menstrual period was 2022..     The risks and benefits of endometrial biopsy were reviewed.  All of Ms. Sprague's questions were answered. She voiced her understanding and agreed to proceed with endometrial biopsy.  Informed consent was obtained.      Objective:     UPT is negative    ----------------------------------                   22                             1109            ----------------------------------   BP:              104/72            Weight: 101.8 kg (224 lb 6.9 oz)   Height:     5' 5" (1.651 m)        PainSc:        0-No pain          ----------------------------------  GENERAL: Well nourished, well developed, in no acute distress.    Endometrial Biopsy Procedure:     TIME OUT PERFORMED.     Speculum placed and betadine used to clean the cervix. A single tooth tenaculum was placed on the anterior lip of the cervix. EMB pipelle inserted and the uterus sounded to 6 cm. 2 passes were done. small amount of tissue was obtained. Suspect entry into cavity was obstructed by uterine fibroid. Single tooth removed and hemostasis was noted. The speculum was removed.  The patient tolerated the procedure well.    All collected specimens sent to pathology for histologic analysis.      Assessment/Plan:     Negative pregnancy test   POCT urine pregnancy     Abnormal uterine bleeding (AUB)   C. trachomatis/N. gonorrhoeae by AMP DNA [facility]; Cervix   US Pelvis Comp with Transvag NON-OB (xpd; Future; " Expected date: 2022    Specimen to Pathology, Ob/Gyn     Other orders   Biopsy (Gynecological)      Final plan and follow up to be based on biopsy results.    Counselin. The importance of keeping follow-up appointments was discussed.  2. For cramping NSAIDs or Tylenol were recommended.  3. Patient advised that she may resume normal activities, including tampon use and intercourse, as soon as she feels ready.   4. Patient informed that spotting to mild bleeding is to be expected following endometrial biopsy.  5. Patient instructed to call the office for heavy bleeding, significant pain, or a  foul-smelling vaginal discharge.      Use of the [facility] Patient Portal discussed and encouraged during today's visit.

## 2022-02-22 LAB
C TRACH DNA SPEC QL NAA+PROBE: NOT DETECTED
N GONORRHOEA DNA SPEC QL NAA+PROBE: NOT DETECTED

## 2022-02-23 LAB
BACTERIAL VAGINOSIS DNA: NEGATIVE
CANDIDA GLABRATA DNA: NEGATIVE
CANDIDA KRUSEI DNA: NEGATIVE
CANDIDA RRNA VAG QL PROBE: NEGATIVE
T VAGINALIS RRNA GENITAL QL PROBE: NEGATIVE

## 2022-02-28 LAB
FINAL PATHOLOGIC DIAGNOSIS: NORMAL
Lab: NORMAL

## 2022-03-04 ENCOUNTER — DOCUMENT SCAN (OUTPATIENT)
Dept: HOME HEALTH SERVICES | Facility: HOSPITAL | Age: 47
End: 2022-03-04
Payer: MEDICAID

## 2022-03-11 ENCOUNTER — OFFICE VISIT (OUTPATIENT)
Dept: ORTHOPEDICS | Facility: CLINIC | Age: 47
End: 2022-03-11
Payer: MEDICAID

## 2022-03-11 VITALS — BODY MASS INDEX: 36.65 KG/M2 | HEIGHT: 65 IN | WEIGHT: 220 LBS

## 2022-03-11 DIAGNOSIS — M17.11 PRIMARY OSTEOARTHRITIS OF RIGHT KNEE: Primary | ICD-10-CM

## 2022-03-11 DIAGNOSIS — Z96.651 S/P TOTAL KNEE ARTHROPLASTY, RIGHT: ICD-10-CM

## 2022-03-11 PROCEDURE — 1160F PR REVIEW ALL MEDS BY PRESCRIBER/CLIN PHARMACIST DOCUMENTED: ICD-10-PCS | Mod: CPTII,,, | Performed by: ORTHOPAEDIC SURGERY

## 2022-03-11 PROCEDURE — 3008F BODY MASS INDEX DOCD: CPT | Mod: CPTII,,, | Performed by: ORTHOPAEDIC SURGERY

## 2022-03-11 PROCEDURE — 1159F MED LIST DOCD IN RCRD: CPT | Mod: CPTII,,, | Performed by: ORTHOPAEDIC SURGERY

## 2022-03-11 PROCEDURE — 99212 OFFICE O/P EST SF 10 MIN: CPT | Mod: S$PBB,,, | Performed by: ORTHOPAEDIC SURGERY

## 2022-03-11 PROCEDURE — 1160F RVW MEDS BY RX/DR IN RCRD: CPT | Mod: CPTII,,, | Performed by: ORTHOPAEDIC SURGERY

## 2022-03-11 PROCEDURE — 99999 PR PBB SHADOW E&M-EST. PATIENT-LVL III: CPT | Mod: PBBFAC,,, | Performed by: ORTHOPAEDIC SURGERY

## 2022-03-11 PROCEDURE — 99213 OFFICE O/P EST LOW 20 MIN: CPT | Mod: PBBFAC,PN | Performed by: ORTHOPAEDIC SURGERY

## 2022-03-11 PROCEDURE — 3008F PR BODY MASS INDEX (BMI) DOCUMENTED: ICD-10-PCS | Mod: CPTII,,, | Performed by: ORTHOPAEDIC SURGERY

## 2022-03-11 PROCEDURE — 99212 PR OFFICE/OUTPT VISIT, EST, LEVL II, 10-19 MIN: ICD-10-PCS | Mod: S$PBB,,, | Performed by: ORTHOPAEDIC SURGERY

## 2022-03-11 PROCEDURE — 99999 PR PBB SHADOW E&M-EST. PATIENT-LVL III: ICD-10-PCS | Mod: PBBFAC,,, | Performed by: ORTHOPAEDIC SURGERY

## 2022-03-11 PROCEDURE — 1159F PR MEDICATION LIST DOCUMENTED IN MEDICAL RECORD: ICD-10-PCS | Mod: CPTII,,, | Performed by: ORTHOPAEDIC SURGERY

## 2022-03-11 NOTE — PROGRESS NOTES
"Subjective:      Patient ID: Cristina Sprague is a 46 y.o. female.    Chief Complaint: Follow-up of the Right Knee      HPI:  Three months postop  The patient is seen for postop follow-up of right  TKA.  Pain control has been satisfactory  They feel that they are ambulating easily  Postoperative complaints include:  Numbness around the incision      Current Outpatient Medications:     acetaminophen (TYLENOL ARTHRITIS ORAL), Take by mouth., Disp: , Rfl:     ELIQUIS 5 mg Tab, Take 5 mg by mouth 2 (two) times daily., Disp: , Rfl: 11    esomeprazole magnesium (NEXIUM ORAL), Nexium Take No date recorded No form recorded No frequency recorded No route recorded No set duration recorded No set duration amount recorded active No dosage strength recorded No dosage strength units of measure recorded, Disp: , Rfl:     fluticasone propionate (FLONASE) 50 mcg/actuation nasal spray, SPRAY 1 SPRAY IN EACH NOSTRIL ONCE DAILY, Disp: 16 mL, Rfl: 3    multivitamin capsule, Take 1 capsule by mouth once daily., Disp: , Rfl:     gabapentin (NEURONTIN) 300 MG capsule, TAKE 1 CAPSULE BY MOUTH EVERY DAY AT NIGHT, Disp: 30 capsule, Rfl: 1    oxyCODONE-acetaminophen (PERCOCET) 5-325 mg per tablet, Take 1 tablet by mouth every 6 (six) hours as needed for Pain. (Patient not taking: Reported on 1/13/2022), Disp: 45 tablet, Rfl: 0  Review of patient's allergies indicates:  No Known Allergies    Ht 5' 5" (1.651 m)   Wt 99.8 kg (220 lb)   LMP 02/11/2022   BMI 36.61 kg/m²     Review of Systems   Constitutional: Negative for fever and weight loss.   HENT: Negative for congestion.    Eyes: Negative for visual disturbance.   Cardiovascular: Negative for chest pain.   Respiratory: Negative for shortness of breath.    Hematologic/Lymphatic: Negative for bleeding problem. Does not bruise/bleed easily.   Skin: Negative for poor wound healing.   Gastrointestinal: Negative for abdominal pain.   Genitourinary: Negative for dysuria.   Neurological: " Negative for seizures.   Psychiatric/Behavioral: Negative for altered mental status.   Allergic/Immunologic: Negative for persistent infections.           Objective:    Ortho Exam          Alert, oriented, no acute distress  Sclera-Normal  Respiratory distress-none  Gait no limp  Incision:  Cleanly healed  Range of motion:  0-120  Valgus/varus stability- trace varus laxity  Swelling-moderate diffuse edema  Distal perfusion-intact  Distal neurologic-intact    Imaging:  None today    Assessment:             1. Primary osteoarthritis of right knee    2. S/P total knee arthroplasty, right        The patient has made excellent rehab progress.  Considering her preop deformity and alignment, she is likely at maximum improvement.  The lateral numbness is secondary to cutaneous nerve irritation around the incision.        Plan:          Follow up in about 3 months (around 6/11/2022).    I explained my assessment    I reviewed the natural history of recovery from the procedure    I also explained the implications of the baseline status of the patient's knee prior to surgery.    Appropriate progression of activity discussed

## 2022-03-16 NOTE — PROGRESS NOTES
Called to review EMBx results.  Benign but scant tissue.  Patient has repeat US scheduled for 3/18.

## 2022-03-18 ENCOUNTER — HOSPITAL ENCOUNTER (OUTPATIENT)
Dept: RADIOLOGY | Facility: HOSPITAL | Age: 47
Discharge: HOME | End: 2022-03-18
Attending: OBSTETRICS & GYNECOLOGY
Payer: MEDICAID

## 2022-03-18 DIAGNOSIS — N93.9 ABNORMAL UTERINE BLEEDING (AUB): ICD-10-CM

## 2022-03-18 PROCEDURE — 76830 US PELVIS COMP WITH TRANSVAG NON-OB (XPD): ICD-10-PCS | Mod: 26,,, | Performed by: RADIOLOGY

## 2022-03-18 PROCEDURE — 76830 TRANSVAGINAL US NON-OB: CPT | Mod: TC

## 2022-03-18 PROCEDURE — 76830 TRANSVAGINAL US NON-OB: CPT | Mod: 26,,, | Performed by: RADIOLOGY

## 2022-03-18 PROCEDURE — 76856 US EXAM PELVIC COMPLETE: CPT | Mod: 26,,, | Performed by: RADIOLOGY

## 2022-03-18 PROCEDURE — 76856 US PELVIS COMP WITH TRANSVAG NON-OB (XPD): ICD-10-PCS | Mod: 26,,, | Performed by: RADIOLOGY

## 2022-03-21 ENCOUNTER — DOCUMENTATION ONLY (OUTPATIENT)
Dept: REHABILITATION | Facility: HOSPITAL | Age: 47
End: 2022-03-21
Payer: MEDICAID

## 2022-03-21 NOTE — PROGRESS NOTES
Physical Therapy Discharge summary    Pt was evaluated on 21 and was seen 13 times for PT s/p right  TKA. Pt has not attended PT since 22, working towards transition to HEP. Patient given HEP. Plan of care and/or authorization . Pt to be discharged at this time.       Goals:  Short Term Goals:  1. Pt will be independent with HEP supplement PT in improving functional mobility. (Met - 2022)  2. Pt will improve R LE strength to at least 75% of L LE strength as measured via MicroFet handheld dynamometer in order to improve functional mobility (Ongoing, Not met)  3. Pt will improve R knee AROM to at least 100 deg in order to improve gait (Met - 2022)     Long Term Goals:  1. Pt will be independent with updated HEP supplement PT in improving functional mobility. (Ongoing, Not met)  2. Pt will improve R LE strength to at least 90% of L LE strength as measured via MicroFet handheld dynamometer in order to improve functional mobility (Ongoing, Not met)  3. Pt will improve R knee AROM to at least 115 deg in order to improve gait and ability to perform ADLs (Ongoing, Not met)  4. Pt will improve FOTO knee survey score to </= 28% limited in order to demo improved functional mobility (MET - 2022)  5. Pt will improve Koos Jr. Score by 15 pts or better in order to demo improved functional mobility. (Ongoing, Not met)  6. Pt will perform TUG in < 10 seconds without AD in order to demo improved gait speed. (Ongoing, Not met)  7. Pt will perform at least 15 sit to stands without UE support on 30 second sit to stand test in order to demo improved ability to perform transfers. (Ongoing, Not met)    Elba Lauren, PT, DPT

## 2022-03-24 ENCOUNTER — TELEPHONE (OUTPATIENT)
Dept: OBSTETRICS AND GYNECOLOGY | Facility: CLINIC | Age: 47
End: 2022-03-24
Payer: MEDICAID

## 2022-03-24 ENCOUNTER — PATIENT MESSAGE (OUTPATIENT)
Dept: OBSTETRICS AND GYNECOLOGY | Facility: CLINIC | Age: 47
End: 2022-03-24
Payer: MEDICAID

## 2022-03-24 DIAGNOSIS — N94.89 ADNEXAL MASS: Primary | ICD-10-CM

## 2022-03-24 NOTE — TELEPHONE ENCOUNTER
Called patient to review pelvic US.     Results for orders placed during the hospital encounter of 03/18/22    US Pelvis Comp with Transvag NON-OB (xpd    Narrative  EXAMINATION:  US PELVIS COMP WITH TRANSVAG NON-OB (XPD)    CLINICAL HISTORY:  Abnormal uterine and vaginal bleeding, unspecified    TECHNIQUE:  Transabdominal sonography of the pelvis was performed, followed by transvaginal sonography to better evaluate the uterus and ovaries.    COMPARISON:  Pelvic ultrasound dated 11/12/2021    FINDINGS:  Uterus:    Size: 10.0 x 4.2 x 6.1 cm    Masses: Heterogeneous area suggesting fibroid at the posterior body measuring 1.7 x 1.6 x 2.4 cm    Endometrium: Normal in this pre menopausal patient, measuring 6 mm.    Right ovary:    Normal ovarian stroma not well visualized.  Complex cystic structure containing vascular septation in the right adnexa measuring 8.8 x 12.1 x 11.3 cm.  Predominately cystic lesion at the right adnexa on prior exam measuring up to 7.9 cm.    Left ovary:    Size: 2.1 x 1.4 x 1.8 cm    Appearance: Normal    Vascular Flow: Present    Free Fluid:    None.    Impression  Complex right adnexal structure containing septation with otherwise nonvisualized normal ovarian stroma.  Allowing for variation in technique, this appears intervally larger from November 2021 exam.  Primary considerations to include ovarian cystic neoplasm with component of hydrosalpinx not excluded.  Recommend correlation with dedicated cross-sectional assessment such as contrast enhanced female pelvis MRI.    Uterine fibroid.    This report was flagged in Epic as abnormal.      Electronically signed by: Blaise Neely  Date:    03/18/2022  Time:    15:40      Reviewed  from 11/2021.     Recommend follow up with GYN ONC given interval changes to adnexal mass.     Patient aware.         Delma Roach MD  [facility]  03/24/2022

## 2022-04-06 ENCOUNTER — LAB VISIT (OUTPATIENT)
Dept: LAB | Facility: OTHER | Age: 47
End: 2022-04-06
Payer: MEDICAID

## 2022-04-06 ENCOUNTER — OFFICE VISIT (OUTPATIENT)
Dept: GYNECOLOGIC ONCOLOGY | Facility: CLINIC | Age: 47
End: 2022-04-06
Payer: MEDICAID

## 2022-04-06 VITALS
DIASTOLIC BLOOD PRESSURE: 65 MMHG | BODY MASS INDEX: 36.24 KG/M2 | HEART RATE: 56 BPM | SYSTOLIC BLOOD PRESSURE: 115 MMHG | WEIGHT: 217.81 LBS

## 2022-04-06 DIAGNOSIS — Z79.01 ANTICOAGULANT LONG-TERM USE: ICD-10-CM

## 2022-04-06 DIAGNOSIS — E66.9 CLASS 1 OBESITY IN ADULT, UNSPECIFIED BMI, UNSPECIFIED OBESITY TYPE, UNSPECIFIED WHETHER SERIOUS COMORBIDITY PRESENT: ICD-10-CM

## 2022-04-06 DIAGNOSIS — N94.89 ADNEXAL MASS: Primary | ICD-10-CM

## 2022-04-06 DIAGNOSIS — Z15.89 MTHFR MUTATION: ICD-10-CM

## 2022-04-06 DIAGNOSIS — Z86.711 HISTORY OF PULMONARY EMBOLISM: ICD-10-CM

## 2022-04-06 DIAGNOSIS — N94.89 ADNEXAL MASS: ICD-10-CM

## 2022-04-06 LAB
ALBUMIN SERPL BCP-MCNC: 3.9 G/DL (ref 3.5–5.2)
ALP SERPL-CCNC: 46 U/L (ref 55–135)
ALT SERPL W/O P-5'-P-CCNC: 11 U/L (ref 10–44)
ANION GAP SERPL CALC-SCNC: 10 MMOL/L (ref 8–16)
AST SERPL-CCNC: 13 U/L (ref 10–40)
BASOPHILS # BLD AUTO: 0.04 K/UL (ref 0–0.2)
BASOPHILS NFR BLD: 0.5 % (ref 0–1.9)
BILIRUB SERPL-MCNC: 0.4 MG/DL (ref 0.1–1)
BUN SERPL-MCNC: 19 MG/DL (ref 6–20)
CALCIUM SERPL-MCNC: 9.4 MG/DL (ref 8.7–10.5)
CHLORIDE SERPL-SCNC: 107 MMOL/L (ref 95–110)
CO2 SERPL-SCNC: 22 MMOL/L (ref 23–29)
CREAT SERPL-MCNC: 0.8 MG/DL (ref 0.5–1.4)
DIFFERENTIAL METHOD: ABNORMAL
EOSINOPHIL # BLD AUTO: 0 K/UL (ref 0–0.5)
EOSINOPHIL NFR BLD: 0.3 % (ref 0–8)
ERYTHROCYTE [DISTWIDTH] IN BLOOD BY AUTOMATED COUNT: 15.6 % (ref 11.5–14.5)
EST. GFR  (AFRICAN AMERICAN): >60 ML/MIN/1.73 M^2
EST. GFR  (NON AFRICAN AMERICAN): >60 ML/MIN/1.73 M^2
GLUCOSE SERPL-MCNC: 81 MG/DL (ref 70–110)
HCT VFR BLD AUTO: 37.3 % (ref 37–48.5)
HGB BLD-MCNC: 12.1 G/DL (ref 12–16)
IMM GRANULOCYTES # BLD AUTO: 0.02 K/UL (ref 0–0.04)
IMM GRANULOCYTES NFR BLD AUTO: 0.2 % (ref 0–0.5)
LYMPHOCYTES # BLD AUTO: 2.6 K/UL (ref 1–4.8)
LYMPHOCYTES NFR BLD: 29.7 % (ref 18–48)
MCH RBC QN AUTO: 27.9 PG (ref 27–31)
MCHC RBC AUTO-ENTMCNC: 32.4 G/DL (ref 32–36)
MCV RBC AUTO: 86 FL (ref 82–98)
MONOCYTES # BLD AUTO: 0.4 K/UL (ref 0.3–1)
MONOCYTES NFR BLD: 5 % (ref 4–15)
NEUTROPHILS # BLD AUTO: 5.6 K/UL (ref 1.8–7.7)
NEUTROPHILS NFR BLD: 64.3 % (ref 38–73)
NRBC BLD-RTO: 0 /100 WBC
PLATELET # BLD AUTO: 287 K/UL (ref 150–450)
PMV BLD AUTO: 9.6 FL (ref 9.2–12.9)
POTASSIUM SERPL-SCNC: 4.3 MMOL/L (ref 3.5–5.1)
PROT SERPL-MCNC: 7.5 G/DL (ref 6–8.4)
RBC # BLD AUTO: 4.33 M/UL (ref 4–5.4)
SODIUM SERPL-SCNC: 139 MMOL/L (ref 136–145)
WBC # BLD AUTO: 8.65 K/UL (ref 3.9–12.7)

## 2022-04-06 PROCEDURE — 1159F MED LIST DOCD IN RCRD: CPT | Mod: CPTII,,, | Performed by: OBSTETRICS & GYNECOLOGY

## 2022-04-06 PROCEDURE — 3074F PR MOST RECENT SYSTOLIC BLOOD PRESSURE < 130 MM HG: ICD-10-PCS | Mod: CPTII,,, | Performed by: OBSTETRICS & GYNECOLOGY

## 2022-04-06 PROCEDURE — 85025 COMPLETE CBC W/AUTO DIFF WBC: CPT | Performed by: PHYSICIAN ASSISTANT

## 2022-04-06 PROCEDURE — 99215 OFFICE O/P EST HI 40 MIN: CPT | Mod: PBBFAC | Performed by: OBSTETRICS & GYNECOLOGY

## 2022-04-06 PROCEDURE — 99999 PR PBB SHADOW E&M-EST. PATIENT-LVL V: CPT | Mod: PBBFAC,,, | Performed by: OBSTETRICS & GYNECOLOGY

## 2022-04-06 PROCEDURE — 99215 PR OFFICE/OUTPT VISIT, EST, LEVL V, 40-54 MIN: ICD-10-PCS | Mod: 57,S$PBB,, | Performed by: OBSTETRICS & GYNECOLOGY

## 2022-04-06 PROCEDURE — 3078F DIAST BP <80 MM HG: CPT | Mod: CPTII,,, | Performed by: OBSTETRICS & GYNECOLOGY

## 2022-04-06 PROCEDURE — 80053 COMPREHEN METABOLIC PANEL: CPT | Performed by: PHYSICIAN ASSISTANT

## 2022-04-06 PROCEDURE — 3008F BODY MASS INDEX DOCD: CPT | Mod: CPTII,,, | Performed by: OBSTETRICS & GYNECOLOGY

## 2022-04-06 PROCEDURE — 1159F PR MEDICATION LIST DOCUMENTED IN MEDICAL RECORD: ICD-10-PCS | Mod: CPTII,,, | Performed by: OBSTETRICS & GYNECOLOGY

## 2022-04-06 PROCEDURE — 36415 COLL VENOUS BLD VENIPUNCTURE: CPT | Performed by: PHYSICIAN ASSISTANT

## 2022-04-06 PROCEDURE — 3078F PR MOST RECENT DIASTOLIC BLOOD PRESSURE < 80 MM HG: ICD-10-PCS | Mod: CPTII,,, | Performed by: OBSTETRICS & GYNECOLOGY

## 2022-04-06 PROCEDURE — 99999 PR PBB SHADOW E&M-EST. PATIENT-LVL V: ICD-10-PCS | Mod: PBBFAC,,, | Performed by: OBSTETRICS & GYNECOLOGY

## 2022-04-06 PROCEDURE — 3008F PR BODY MASS INDEX (BMI) DOCUMENTED: ICD-10-PCS | Mod: CPTII,,, | Performed by: OBSTETRICS & GYNECOLOGY

## 2022-04-06 PROCEDURE — 3074F SYST BP LT 130 MM HG: CPT | Mod: CPTII,,, | Performed by: OBSTETRICS & GYNECOLOGY

## 2022-04-06 PROCEDURE — 99215 OFFICE O/P EST HI 40 MIN: CPT | Mod: 57,S$PBB,, | Performed by: OBSTETRICS & GYNECOLOGY

## 2022-04-06 RX ORDER — LIDOCAINE HYDROCHLORIDE 10 MG/ML
1 INJECTION, SOLUTION EPIDURAL; INFILTRATION; INTRACAUDAL; PERINEURAL ONCE
Status: CANCELLED | OUTPATIENT
Start: 2022-04-06 | End: 2022-04-06

## 2022-04-06 RX ORDER — GABAPENTIN 100 MG/1
300 CAPSULE ORAL ONCE
Status: CANCELLED | OUTPATIENT
Start: 2022-04-06 | End: 2022-04-06

## 2022-04-06 RX ORDER — ACETAMINOPHEN 325 MG/1
1000 TABLET ORAL ONCE
Status: CANCELLED | OUTPATIENT
Start: 2022-04-06 | End: 2022-04-06

## 2022-04-06 RX ORDER — CELECOXIB 100 MG/1
200 CAPSULE ORAL ONCE
Status: CANCELLED | OUTPATIENT
Start: 2022-04-06 | End: 2022-04-06

## 2022-04-06 NOTE — PROGRESS NOTES
REFERRING PROVIDER  Delma Roach MD     HISTORY OF PRESENT CONDITION  CC: Adnexal Mass  Cristina Sprague is a 46 y.o.  who presents in consultation at for an opinion regarding right adnexal mass.     Denies any fever, chills, chest pain, SOB, N/V/D, dysuria, urgency, changes in appetite, unintentional weight loss, or pelvic pain. She does report she feels as though she cannot empty her bladder completely.     REVIEW OF SYSTEMS  All systems reviewed and negative except as noted in HPI.    OBJECTIVE   Vitals:    22 1330   BP: 115/65   Pulse: (!) 56      Body mass index is 36.24 kg/m².      1. General: Well appearing, no apparent distress, alert and oriented.  2. Lymph: Neck symmetric without cervical or supraclavicular adenopathy or mass.  3. Lungs: Normal respiratory rate, no accessory muscle use.  4. Cardiac: Normal rate  5. Psych: Normal affect.  6. Abdomen:  non-distended, soft, non-tender,  no ascites, no hepatosplenomegaly.  7. Skin: Warm, dry, no rashes or lesions.   8. Extremities: Bilateral lower extremities without edema or tenderness.  9. Genitourinary               Pelvic Examination including:                a. External genitalia are normal in appearance. No lesions noted.               b. Urethral meatus is normal size, location, and appearance.               c. Urethra is negative.               d. Bladder is nontender. No masses noted.               e. Vagina has normal mucosa with physiologic discharge. No lesions noted.              f. Uterus with a midline cervix that is normal to palpation              g. Adnexa fullness in the left adnexa, smooth, mobile.       ECOG status: 1    LABORATORY DATA  Lab data reviewed.    RADIOLOGICAL DATA  Radiology data reviewed.    PATHOLOGY DATA  Pathology data reviewed.    ASSESSMENT / PLAN     1. Adnexal mass    2. History of pulmonary embolism    3. MTHFR mutation    4. Anticoagulant long-term use    5. Class 1 obesity in adult, unspecified BMI,  unspecified obesity type, unspecified whether serious comorbidity present       PSH: Gastric sleeve, Lap cholecystectomy, C/S x 2 (L-trans), IVC filter (still in place).  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     12/3/21: CA-125 = 11  4/6/21: Hb 12, Plt 287, Cr 0.8  2/18/21: Pelvic US: 10 cm uterus; L complex adnexal mass 8 cm  11/21/21: Pelvic US: L complex adnexal mass 8 cm    F/U CT A/P  F/U CXR  F/U EKG  F/U PCP clearance and Eliquis recommendations    I discussed with the patient that the primary treatment for an adnexal mass is observation versus surgical management.  Surgical management will depend on the use of frozen pathology.  Differential diagnoses includes a benign, borderline, or malignant mass.  I will plan to perform this in a robotic fashion.  I discussed with the patient the role of staging for borderline frozen pathology and debulking for ovarian cancer but that we would defer the later.  The procedure and its risks and benefits were discussed in detail.    23H OBSERVATION  NO NSAIDs    PATIENT EDUCATION  Ready to learn, no apparent learning barriers were identified; learning preferences include listening.  Explained diagnosis and treatment plan; patient expressed understanding of the content.    INFORMED CONSENT  Discussed the risks, benefits, and alternatives of the procedure and of possible blood transfusion.  Discussed the necessity of other members of the healthcare team participating in the procedure.  All questions answered and consent given.

## 2022-04-07 ENCOUNTER — PATIENT MESSAGE (OUTPATIENT)
Dept: SURGERY | Facility: HOSPITAL | Age: 47
End: 2022-04-07
Payer: MEDICAID

## 2022-04-08 ENCOUNTER — HOSPITAL ENCOUNTER (OUTPATIENT)
Dept: RADIOLOGY | Facility: HOSPITAL | Age: 47
Discharge: HOME | End: 2022-04-08
Attending: PHYSICIAN ASSISTANT
Payer: MEDICAID

## 2022-04-08 ENCOUNTER — CLINICAL SUPPORT (OUTPATIENT)
Dept: LAB | Facility: HOSPITAL | Age: 47
End: 2022-04-08
Attending: PHYSICIAN ASSISTANT
Payer: MEDICAID

## 2022-04-08 DIAGNOSIS — N94.89 ADNEXAL MASS: ICD-10-CM

## 2022-04-08 PROCEDURE — 93010 EKG 12-LEAD: ICD-10-PCS | Mod: ,,, | Performed by: INTERNAL MEDICINE

## 2022-04-08 PROCEDURE — 71046 X-RAY EXAM CHEST 2 VIEWS: CPT | Mod: 26,,, | Performed by: RADIOLOGY

## 2022-04-08 PROCEDURE — 93010 ELECTROCARDIOGRAM REPORT: CPT | Mod: ,,, | Performed by: INTERNAL MEDICINE

## 2022-04-08 PROCEDURE — 71046 X-RAY EXAM CHEST 2 VIEWS: CPT | Mod: TC,FY

## 2022-04-08 PROCEDURE — 93005 ELECTROCARDIOGRAM TRACING: CPT

## 2022-04-08 PROCEDURE — 71046 XR CHEST PA AND LATERAL: ICD-10-PCS | Mod: 26,,, | Performed by: RADIOLOGY

## 2022-04-11 NOTE — ANESTHESIA PAT ROS NOTE
04/11/2022  Cristina Sprague is a 46 y.o., female.      Pre-op Assessment          Review of Systems  Anesthesia Hx:  Hx of Anesthetic complications PONV-PATIENT STATES PATCH BEHIND EAR PRIOR TO ANESTHESIA RELIEVED NAUSEA History of prior surgery of interest to airway management or planning: gastric bypass. Previous anesthesia: Spinal  11/17/2021: ARTHROPLASTY, KNEE (Right Knee) with spinal.  Denies Family Hx of Anesthesia complications.  Personal Hx of Anesthesia complications, Post-Operative Nausea/Vomiting   Social:  Non-Smoker, Social Alcohol Use    Hematology/Oncology:     Oncology Normal   Hematology Comments: METHYLENETETRAHYDROFOLATE REDUCTASE DEFICIENCY (MTHFR)-GENE MUTATION   EENT/Dental:EENT/Dental Normal   Cardiovascular:    Denies Angina. HX DVT C/B PE X 2 (2006) ON BIRTH CONTROL-DIAGNOSED WITH METHYLENETETRAHYDROFOLATE REDUCTASE DEFICIENCY (MTHFR) GENE MUTATION-TREATED WITH ANTICOAGULANTS- CURRENT IVC FILTER PLACED PRIOR TO BACK SURGERY IN 2018  PE X 1 (2014) WITH ECTOPIC PREGNANCY    CURRENTLY ON ELIQUIS     Functional Capacity good / => 4 METS  Deep Venous Thrombosis (DVT), Hx of DVT    Pulmonary:   Denies Shortness of breath.  Denies Recent URI. HX DVT C/B PE X 2 (2006) ON BIRTH CONTROL-DIAGNOSED WITH METHYLENETETRAHYDROFOLATE REDUCTASE DEFICIENCY (MTHFR) GENE MUTATION-TREATED WITH ANTICOAGULANTS- CURRENT IVC FILTER PLACED PRIOR TO BACK SURGERY IN 2018  PE X 1 (2014) WITH ECTOPIC PREGNANCY Pulmonary Embolism, > 6 months ago    Renal/:  Renal/ Normal     Hepatic/GI:   GERD GASTRIC SLEEVE(2020)   Musculoskeletal:  Joint Disease:  Arthritis, Osteoarthritis RIGHT TKA (11/2021) Lumbar Spine Disorders, Lumbar Disc Disease, Radiculopathy, S/P Lumbar Fusion LUMBAR FUSION L4-5 (2018)  OB/GYN/PEDS:  ADNEXAL MASS   Neurological:  Neurology Normal  Osteoarthritis    Endocrine:  Obesity / BMI >  30  Dermatological:  Skin Normal    Psych:   anxiety depression          Physical Exam  General:  Obesity      Airway/Jaw/Neck:  Airway Findings: Mouth Opening: Normal   Tongue: Normal   Jaw/Neck Findings: Neck ROM: Normal ROM       Dental:  Dental Findings: In tact     Chest/Lungs:  Chest/Lungs Findings: Clear to auscultation      Heart/Vascular:  Heart Findings: Rate: Normal  Rhythm: Regular Rhythm  Sounds: Normal        Mental Status:  Mental Status Findings:  Cooperative, Alert and Oriented         Anesthesia Assessment: Preoperative EQUATION    Planned Procedure: Procedure(s) (LRB):  XI ROBOTIC LAPAROSCOPY,DIAGNOSTIC (N/A)  SALPINGO-OOPHORECTOMY (Bilateral)  Requested Anesthesia Type:General  Surgeon: Aurelio Inman MD  Service: Oncology  Known or anticipated Date of Surgery:5/12/2022    Surgeon notes: reviewed    Previous anesthesia records:Spinal Anesthesia  and Hx PONV   11/17/2021 Right Knee Arthroplasty  Airway/Jaw/Neck:  Airway Findings: Mouth Opening: Normal Tongue: Normal  General Airway Assessment: Adult  Mallampati: I  TM Distance: Normal, at least 6 cm       Last PCP note: within 1 month , outside [facility]   Subspecialty notes: Gyn/ONC, Ortho    Other important co-morbidities:Per EPIC Obesity and Hx PE with IVC filter, Gastric sleeve, Methylenetetrahydrofolate reductase (MTHFR) deficiency      Tests already available:  Available tests,  within 3 months , within [facility] .   4/8/2023 EKG  4/6/2022 CBC, CMP    Optimization:  Anesthesia Preop Clinic Assessment  Indicated.    Medical Opinion Indicated.           Plan:    Testing:  T&S   Pre-anesthesia  visit       Visit focus: concerns in complex and/or prolonged anesthesia, position other than supine, past history of problem with anesthesia     Consultation:Patient's PCP for a statement of optimization      Patient  has previously scheduled Medical Appointment: N/A    Navigation: Tests Scheduled.              Consults scheduled.             Results will be  tracked by Preop Clinic.     4/11/2022 PreOp Clearance per PCP Dr. Pratt: in [facility]  Preoperative clearance:  Patient cleared for this necessary surgery. Minimally increased preoperative cardiac risk as detailed in HPI. EKG, CXR and lab work within normal limits; Patient Instructed to pause Eliquis for 48 hours prior to surgery and resume her normal dosage the morning following surgery

## 2022-04-12 ENCOUNTER — HOSPITAL ENCOUNTER (OUTPATIENT)
Dept: RADIOLOGY | Facility: HOSPITAL | Age: 47
Discharge: HOME | End: 2022-04-12
Attending: PHYSICIAN ASSISTANT
Payer: MEDICAID

## 2022-04-12 DIAGNOSIS — N94.89 ADNEXAL MASS: ICD-10-CM

## 2022-04-12 PROCEDURE — 74178 CT ABDOMEN PELVIS W WO CONTRAST: ICD-10-PCS | Mod: 26,,, | Performed by: RADIOLOGY

## 2022-04-12 PROCEDURE — 74178 CT ABD&PLV WO CNTR FLWD CNTR: CPT | Mod: 26,,, | Performed by: RADIOLOGY

## 2022-04-12 PROCEDURE — 25500020 PHARM REV CODE 255: Performed by: PHYSICIAN ASSISTANT

## 2022-04-12 PROCEDURE — 74178 CT ABD&PLV WO CNTR FLWD CNTR: CPT | Mod: TC

## 2022-04-12 PROCEDURE — A9698 NON-RAD CONTRAST MATERIALNOC: HCPCS | Performed by: PHYSICIAN ASSISTANT

## 2022-04-12 RX ADMIN — IOHEXOL 1000 ML: 12 SOLUTION ORAL at 07:04

## 2022-04-12 RX ADMIN — IOHEXOL 100 ML: 350 INJECTION, SOLUTION INTRAVENOUS at 09:04

## 2022-04-13 ENCOUNTER — TELEPHONE (OUTPATIENT)
Dept: PREADMISSION TESTING | Facility: HOSPITAL | Age: 47
End: 2022-04-13
Payer: MEDICAID

## 2022-04-13 NOTE — TELEPHONE ENCOUNTER
----- Message from Marian Briseno RN sent at 4/11/2022  1:41 PM CDT -----  Surgery date: 5/12/2022  PreOp appt: N/A    Please call Pt and schedule the following preop appts:    POC  Lab    Thank you!  Marian

## 2022-04-14 ENCOUNTER — PATIENT MESSAGE (OUTPATIENT)
Dept: SURGERY | Facility: HOSPITAL | Age: 47
End: 2022-04-14
Payer: MEDICAID

## 2022-05-02 ENCOUNTER — ANESTHESIA EVENT (OUTPATIENT)
Dept: SURGERY | Facility: HOSPITAL | Age: 47
DRG: 742 | End: 2022-05-02
Payer: MEDICAID

## 2022-05-02 ENCOUNTER — HOSPITAL ENCOUNTER (OUTPATIENT)
Dept: PREADMISSION TESTING | Facility: HOSPITAL | Age: 47
Discharge: HOME | End: 2022-05-02
Attending: OBSTETRICS & GYNECOLOGY
Payer: MEDICAID

## 2022-05-02 NOTE — DISCHARGE INSTRUCTIONS
Your surgery has been scheduled for:________5/12/2022__________________________________    You should report to:  ____[facility], located on the [place] side of the first floor of the           [facility] ([phone])  __X__[facility], located on the [facility] side of the            Second floor of the [facility] ([phone])  ____3rd Floor [facility] located on the [facility] side of the [facility] [phone]  ____[facility]: located at [address] [facility] [place] [address]. [address]     Please Note   Tell your doctor if you take Aspirin, products containing Aspirin, herbal medications  or blood thinners, such as Coumadin, Ticlid, or Plavix.  (Consult your provider regarding holding or stopping before surgery).  Arrange for someone to drive you home following surgery.  You will not be allowed to leave the surgical facility alone or drive yourself home following sedation and anesthesia.    Before Surgery  Stop taking all herbal medications, vitamins, and supplements 7 days prior to surgery  No Motrin/Advil (Ibuprofen) 7 days before surgery  No Aleve (Naproxen) 7 days before surgery  Stop Taking Asprin, products containing Asprin _N/A____days before surgery  Stop taking blood thinners___2____days before surgery  No Goody's/BC  Powder 7 days before surgery  Refrain from drinking alcoholic beverages for 24hours before and after surgery  Stop or limit smoking ___7______days before surgery  You may take Tylenol for pain    Night before Surgery  Do not eat or drink after midnight  Take a shower or bath (shower is recommended).  Bathe with Hibiclens soap or an antibacterial soap from the neck down.  If not supplied by your surgeon, hibiclens soap will need to be purchased over the counter in pharmacy.  Rinse soap off thoroughly.  Shampoo your hair with your regular shampoo    The Day of  Surgery  Take another bath or shower with hibiclens or any antibacterial soap, to reduce the chance of infection.  Take heart and blood pressure medications with a small sip of water, as advised by the perioperative team.  Do not take fluid pills  You may brush your teeth and rinse your mouth, but do not swall any additional water.   Do not apply perfumes, powder, body lotions or deodorant on the day of surgery.  Nail polish should be removed.  Do not wear makeup or moisturizer  Wear comfortable clothes, such as a button front shirt and loose fitting pants.  Leave all jewelry, including body piercings, and valuables at home.    Bring any devices you will neeed after surgery such as crutches or canes.  If you have sleep apnea, please bring your CPAP machine  In the event that your physical condition changes including the onset of a cold or respiratory illness, or if you have to delay or cancel your surgery, please notify your surgeon.       Anesthesia: General Anesthesia     You are watched continuously during your procedure by your anesthesia provider.     Youre due to have surgery. During surgery, youll be given medicine called anesthesia or anesthetic. This will keep you comfortable and pain-free. Your anesthesia provider will use general anesthesia.  What is general anesthesia?  General anesthesia puts you into a state like deep sleep. It goes into the bloodstream (IV anesthetics), into the lungs (gas anesthetics), or both. You feel nothing during the procedure. You will not remember it. During the procedure, the anesthesia provider monitors you continuously. He or she checks your heart rate and rhythm, blood pressure, breathing, and blood oxygen.  IV anesthetics. IV anesthetics are given through an IV line in your arm. Theyre often given first. This is so you are asleep before a gas anesthetic is started. Some kinds of IV anesthetics relieve pain. Others relax you. Your doctor will decide which kind is best  in your case.  Gas anesthetics. Gas anesthetics are breathed into the lungs. They are often used to keep you asleep. They can be given through a facemask or a tube placed in your larynx or trachea (breathing tube).  If you have a facemask, your anesthesia provider will most likely place it over your nose and mouth while youre still awake. Youll breathe oxygen through the mask as your IV anesthetic is started. Gas anesthetic may be added through the mask.  If you have a tube in the larynx or trachea, it will be inserted into your throat after youre asleep.  Anesthesia tools and medicines  You will likely have:  IV anesthetics. These are put into an IV line into your bloodstream.  Gas anesthetics. You breathe these anesthetics into your lungs, where they pass into your bloodstream.  Pulse oximeter. This is a small clip that is attached to the end of your finger. This measures your blood oxygen level.  Electrocardiography leads (electrodes). These are small sticky pads that are placed on your chest. They record your heart rate and rhythm.  Blood pressure cuff. This reads your blood pressure.  Risks and possible complications  General anesthesia has some risks. These include:  Breathing problems  Nausea and vomiting  Sore throat or hoarseness (usually temporary)  Allergic reaction to the anesthetic  Irregular heartbeat (rare)  Cardiac arrest (rare)   Anesthesia safety  Follow all instructions you are given for how long not to eat or drink before your procedure.  Be sure your doctor knows what medicines and drugs you take. This includes over-the-counter medicines, herbs, supplements, alcohol or other drugs. You will be asked when those were last taken.  Have an adult family member or friend drive you home after the procedure.  For the first 24 hours after your surgery:  Do not drive or use heavy equipment.  Do not make important decisions or sign legal documents. If important decisions or signing legal documents is  necessary during the first 24 hours after surgery, have a trusted family member or spouse act on your behalf.  Avoid alcohol.  Have a responsible adult stay with you. He or she can watch for problems and help keep you safe.  Date Last Reviewed: 12/1/2016 © 2000-2017 [employer]. [address] [place] [address], [place] [address]. All rights reserved. This information is not intended as a substitute for professional medical care. Always follow your healthcare professional's instructions.

## 2022-05-11 ENCOUNTER — TELEPHONE (OUTPATIENT)
Dept: GYNECOLOGIC ONCOLOGY | Facility: CLINIC | Age: 47
End: 2022-05-11
Payer: MEDICAID

## 2022-05-11 ENCOUNTER — NURSE TRIAGE (OUTPATIENT)
Dept: ADMINISTRATIVE | Facility: CLINIC | Age: 47
End: 2022-05-11
Payer: MEDICAID

## 2022-05-11 NOTE — TELEPHONE ENCOUNTER
Pt reports she was recently exposed to her nephews (4 days) who have tested positive for Covid. Reports she did wake up with a cough this AM but otherwise sounds well. She is calling to see if her procedure should be rescheduled tomorrow AM. I have called and spoken with  in this regard. Reports if patient test negative tonight then procedure may take place. Patient is going to test and agrees to call back if results are positive.       Patient calling to notify that she and her children have all tested negative. She plans to be at her procedure in the AM.    Reason for Disposition   Nursing judgment or information in reference    Protocols used: NO GUIDELINE [id number]

## 2022-05-12 ENCOUNTER — TELEPHONE (OUTPATIENT)
Dept: GYNECOLOGIC ONCOLOGY | Facility: CLINIC | Age: 47
End: 2022-05-12
Payer: MEDICAID

## 2022-05-12 ENCOUNTER — ANESTHESIA (OUTPATIENT)
Dept: SURGERY | Facility: HOSPITAL | Age: 47
DRG: 742 | End: 2022-05-12
Payer: MEDICAID

## 2022-05-12 ENCOUNTER — HOSPITAL ENCOUNTER (INPATIENT)
Facility: HOSPITAL | Age: 47
LOS: 1 days | Discharge: HOME | DRG: 742 | End: 2022-05-13
Attending: OBSTETRICS & GYNECOLOGY | Admitting: OBSTETRICS & GYNECOLOGY
Payer: MEDICAID

## 2022-05-12 DIAGNOSIS — Z90.710 S/P LAPAROSCOPIC HYSTERECTOMY: ICD-10-CM

## 2022-05-12 DIAGNOSIS — Z86.711 HISTORY OF PULMONARY EMBOLISM: ICD-10-CM

## 2022-05-12 DIAGNOSIS — Z15.89 MTHFR MUTATION: ICD-10-CM

## 2022-05-12 DIAGNOSIS — Z01.818 PREOPERATIVE TESTING: Primary | ICD-10-CM

## 2022-05-12 DIAGNOSIS — N94.89 ADNEXAL MASS: ICD-10-CM

## 2022-05-12 LAB
B-HCG UR QL: NEGATIVE
BASOPHILS # BLD AUTO: 0.02 K/UL (ref 0–0.2)
BASOPHILS NFR BLD: 0.3 % (ref 0–1.9)
CTP QC/QA: YES
DIFFERENTIAL METHOD: ABNORMAL
EOSINOPHIL # BLD AUTO: 0 K/UL (ref 0–0.5)
EOSINOPHIL NFR BLD: 0 % (ref 0–8)
ERYTHROCYTE [DISTWIDTH] IN BLOOD BY AUTOMATED COUNT: 16.3 % (ref 11.5–14.5)
HCT VFR BLD AUTO: 34.1 % (ref 37–48.5)
HGB BLD-MCNC: 10.6 G/DL (ref 12–16)
IMM GRANULOCYTES # BLD AUTO: 0.02 K/UL (ref 0–0.04)
IMM GRANULOCYTES NFR BLD AUTO: 0.3 % (ref 0–0.5)
LYMPHOCYTES # BLD AUTO: 0.7 K/UL (ref 1–4.8)
LYMPHOCYTES NFR BLD: 12.2 % (ref 18–48)
MCH RBC QN AUTO: 27.9 PG (ref 27–31)
MCHC RBC AUTO-ENTMCNC: 31.1 G/DL (ref 32–36)
MCV RBC AUTO: 90 FL (ref 82–98)
MONOCYTES # BLD AUTO: 0.1 K/UL (ref 0.3–1)
MONOCYTES NFR BLD: 2.1 % (ref 4–15)
NEUTROPHILS # BLD AUTO: 5 K/UL (ref 1.8–7.7)
NEUTROPHILS NFR BLD: 85.1 % (ref 38–73)
NRBC BLD-RTO: 0 /100 WBC
PLATELET # BLD AUTO: 164 K/UL (ref 150–450)
PMV BLD AUTO: 10.2 FL (ref 9.2–12.9)
RBC # BLD AUTO: 3.8 M/UL (ref 4–5.4)
WBC # BLD AUTO: 5.83 K/UL (ref 3.9–12.7)

## 2022-05-12 PROCEDURE — 88342 IMHCHEM/IMCYTCHM 1ST ANTB: CPT | Mod: 26,,, | Performed by: PATHOLOGY

## 2022-05-12 PROCEDURE — 58571 PR LAPAROSCOPY W TOT HYSTERECTUTERUS <=250 GRAM  W TUBE/OVARY: ICD-10-PCS | Mod: 22,,, | Performed by: OBSTETRICS & GYNECOLOGY

## 2022-05-12 PROCEDURE — 88307 TISSUE EXAM BY PATHOLOGIST: CPT | Mod: 26,,, | Performed by: PATHOLOGY

## 2022-05-12 PROCEDURE — 37000009 HC ANESTHESIA EA ADD 15 MINS: Performed by: OBSTETRICS & GYNECOLOGY

## 2022-05-12 PROCEDURE — 88305 TISSUE EXAM BY PATHOLOGIST: ICD-10-PCS | Mod: 26,,, | Performed by: PATHOLOGY

## 2022-05-12 PROCEDURE — 71000015 HC POSTOP RECOV 1ST HR: Performed by: OBSTETRICS & GYNECOLOGY

## 2022-05-12 PROCEDURE — 63600175 PHARM REV CODE 636 W HCPCS: Performed by: STUDENT IN AN ORGANIZED HEALTH CARE EDUCATION/TRAINING PROGRAM

## 2022-05-12 PROCEDURE — 63600175 PHARM REV CODE 636 W HCPCS: Performed by: OBSTETRICS & GYNECOLOGY

## 2022-05-12 PROCEDURE — D9220A PRA ANESTHESIA: ICD-10-PCS | Mod: ANES,,, | Performed by: ANESTHESIOLOGY

## 2022-05-12 PROCEDURE — 53899 PR LYSIS OF RETROPERITONEAL FIBROSIS: ICD-10-PCS | Mod: ,,, | Performed by: OBSTETRICS & GYNECOLOGY

## 2022-05-12 PROCEDURE — 44970 LAPAROSCOPY APPENDECTOMY: CPT | Mod: 59,,, | Performed by: OBSTETRICS & GYNECOLOGY

## 2022-05-12 PROCEDURE — 88305 TISSUE EXAM BY PATHOLOGIST: CPT | Mod: 26,59,, | Performed by: PATHOLOGY

## 2022-05-12 PROCEDURE — 27201423 OPTIME MED/SURG SUP & DEVICES STERILE SUPPLY: Performed by: OBSTETRICS & GYNECOLOGY

## 2022-05-12 PROCEDURE — 88112 CYTOPATH CELL ENHANCE TECH: CPT | Mod: 26,,, | Performed by: PATHOLOGY

## 2022-05-12 PROCEDURE — 88341 IMHCHEM/IMCYTCHM EA ADD ANTB: CPT | Mod: 26,,, | Performed by: PATHOLOGY

## 2022-05-12 PROCEDURE — 88112 CYTOPATH CELL ENHANCE TECH: CPT | Performed by: PATHOLOGY

## 2022-05-12 PROCEDURE — 71000033 HC RECOVERY, INTIAL HOUR: Performed by: OBSTETRICS & GYNECOLOGY

## 2022-05-12 PROCEDURE — 58571 TLH W/T/O 250 G OR LESS: CPT | Mod: 22,,, | Performed by: OBSTETRICS & GYNECOLOGY

## 2022-05-12 PROCEDURE — 88307 TISSUE EXAM BY PATHOLOGIST: CPT | Performed by: PATHOLOGY

## 2022-05-12 PROCEDURE — D9220A PRA ANESTHESIA: Mod: CRNA,,, | Performed by: STUDENT IN AN ORGANIZED HEALTH CARE EDUCATION/TRAINING PROGRAM

## 2022-05-12 PROCEDURE — 88305 TISSUE EXAM BY PATHOLOGIST: CPT | Mod: 59 | Performed by: PATHOLOGY

## 2022-05-12 PROCEDURE — 82565 ASSAY OF CREATININE: CPT | Performed by: OBSTETRICS & GYNECOLOGY

## 2022-05-12 PROCEDURE — 88331 PR  PATH CONSULT IN SURG,W FRZ SEC: ICD-10-PCS | Mod: 26,,, | Performed by: PATHOLOGY

## 2022-05-12 PROCEDURE — 37000008 HC ANESTHESIA 1ST 15 MINUTES: Performed by: OBSTETRICS & GYNECOLOGY

## 2022-05-12 PROCEDURE — 58571 PR LAPAROSCOPY W TOT HYSTERECTUTERUS <=250 GRAM  W TUBE/OVARY: ICD-10-PCS | Mod: AS,22,, | Performed by: PHYSICIAN ASSISTANT

## 2022-05-12 PROCEDURE — 25000003 PHARM REV CODE 250: Performed by: STUDENT IN AN ORGANIZED HEALTH CARE EDUCATION/TRAINING PROGRAM

## 2022-05-12 PROCEDURE — D9220A PRA ANESTHESIA: Mod: ANES,,, | Performed by: ANESTHESIOLOGY

## 2022-05-12 PROCEDURE — 88304 TISSUE EXAM BY PATHOLOGIST: CPT | Mod: 26,,, | Performed by: PATHOLOGY

## 2022-05-12 PROCEDURE — 88307 PR  SURG PATH,LEVEL V: ICD-10-PCS | Mod: 26,,, | Performed by: PATHOLOGY

## 2022-05-12 PROCEDURE — 63600175 PHARM REV CODE 636 W HCPCS: Performed by: ANESTHESIOLOGY

## 2022-05-12 PROCEDURE — 20600001 HC STEP DOWN PRIVATE ROOM

## 2022-05-12 PROCEDURE — 63600175 PHARM REV CODE 636 W HCPCS

## 2022-05-12 PROCEDURE — 88341 PR IHC OR ICC EACH ADD'L SINGLE ANTIBODY  STAINPR: ICD-10-PCS | Mod: 26,,, | Performed by: PATHOLOGY

## 2022-05-12 PROCEDURE — 88341 IMHCHEM/IMCYTCHM EA ADD ANTB: CPT | Mod: 59 | Performed by: PATHOLOGY

## 2022-05-12 PROCEDURE — S0030 INJECTION, METRONIDAZOLE: HCPCS | Performed by: ANESTHESIOLOGY

## 2022-05-12 PROCEDURE — 88342 IMHCHEM/IMCYTCHM 1ST ANTB: CPT | Performed by: PATHOLOGY

## 2022-05-12 PROCEDURE — 25000003 PHARM REV CODE 250: Performed by: OBSTETRICS & GYNECOLOGY

## 2022-05-12 PROCEDURE — 36415 COLL VENOUS BLD VENIPUNCTURE: CPT | Performed by: OBSTETRICS & GYNECOLOGY

## 2022-05-12 PROCEDURE — 36000713 HC OR TIME LEV V EA ADD 15 MIN: Performed by: OBSTETRICS & GYNECOLOGY

## 2022-05-12 PROCEDURE — 58571 TLH W/T/O 250 G OR LESS: CPT | Mod: AS,22,, | Performed by: PHYSICIAN ASSISTANT

## 2022-05-12 PROCEDURE — 71000016 HC POSTOP RECOV ADDL HR: Performed by: OBSTETRICS & GYNECOLOGY

## 2022-05-12 PROCEDURE — 94761 N-INVAS EAR/PLS OXIMETRY MLT: CPT

## 2022-05-12 PROCEDURE — 88304 PR  SURG PATH,LEVEL III: ICD-10-PCS | Mod: 26,,, | Performed by: PATHOLOGY

## 2022-05-12 PROCEDURE — 25000003 PHARM REV CODE 250: Performed by: ANESTHESIOLOGY

## 2022-05-12 PROCEDURE — 85025 COMPLETE CBC W/AUTO DIFF WBC: CPT | Performed by: OBSTETRICS & GYNECOLOGY

## 2022-05-12 PROCEDURE — 44970 PR LAP,APPENDECTOMY: ICD-10-PCS | Mod: AS,59,, | Performed by: PHYSICIAN ASSISTANT

## 2022-05-12 PROCEDURE — D9220A PRA ANESTHESIA: ICD-10-PCS | Mod: CRNA,,, | Performed by: STUDENT IN AN ORGANIZED HEALTH CARE EDUCATION/TRAINING PROGRAM

## 2022-05-12 PROCEDURE — 88304 TISSUE EXAM BY PATHOLOGIST: CPT | Performed by: PATHOLOGY

## 2022-05-12 PROCEDURE — 88112 PR  CYTOPATH, CELL ENHANCE TECH: ICD-10-PCS | Mod: 26,,, | Performed by: PATHOLOGY

## 2022-05-12 PROCEDURE — 88305 TISSUE EXAM BY PATHOLOGIST: ICD-10-PCS | Mod: 26,59,, | Performed by: PATHOLOGY

## 2022-05-12 PROCEDURE — 88305 TISSUE EXAM BY PATHOLOGIST: CPT | Mod: 26,,, | Performed by: PATHOLOGY

## 2022-05-12 PROCEDURE — 44970 LAPAROSCOPY APPENDECTOMY: CPT | Mod: AS,59,, | Performed by: PHYSICIAN ASSISTANT

## 2022-05-12 PROCEDURE — 88331 PATH CONSLTJ SURG 1 BLK 1SPC: CPT | Mod: 59 | Performed by: PATHOLOGY

## 2022-05-12 PROCEDURE — 36000712 HC OR TIME LEV V 1ST 15 MIN: Performed by: OBSTETRICS & GYNECOLOGY

## 2022-05-12 PROCEDURE — 88331 PATH CONSLTJ SURG 1 BLK 1SPC: CPT | Mod: 26,,, | Performed by: PATHOLOGY

## 2022-05-12 PROCEDURE — 53899 UNLISTED PX URINARY SYSTEM: CPT | Mod: ,,, | Performed by: OBSTETRICS & GYNECOLOGY

## 2022-05-12 PROCEDURE — 81025 URINE PREGNANCY TEST: CPT | Performed by: OBSTETRICS & GYNECOLOGY

## 2022-05-12 PROCEDURE — 88342 CHG IMMUNOCYTOCHEMISTRY: ICD-10-PCS | Mod: 26,,, | Performed by: PATHOLOGY

## 2022-05-12 PROCEDURE — 44970 PR LAP,APPENDECTOMY: ICD-10-PCS | Mod: 59,,, | Performed by: OBSTETRICS & GYNECOLOGY

## 2022-05-12 RX ORDER — ZOLPIDEM TARTRATE 5 MG/1
5 TABLET ORAL NIGHTLY PRN
Status: DISCONTINUED | OUTPATIENT
Start: 2022-05-12 | End: 2022-05-13 | Stop reason: HOSPADM

## 2022-05-12 RX ORDER — ATROPINE SULFATE 0.1 MG/ML
INJECTION INTRAVENOUS
Status: COMPLETED
Start: 2022-05-12 | End: 2022-05-12

## 2022-05-12 RX ORDER — MIDAZOLAM HYDROCHLORIDE 1 MG/ML
INJECTION, SOLUTION INTRAMUSCULAR; INTRAVENOUS
Status: DISCONTINUED | OUTPATIENT
Start: 2022-05-12 | End: 2022-05-12

## 2022-05-12 RX ORDER — ONDANSETRON 2 MG/ML
INJECTION INTRAMUSCULAR; INTRAVENOUS
Status: DISCONTINUED | OUTPATIENT
Start: 2022-05-12 | End: 2022-05-12

## 2022-05-12 RX ORDER — HEPARIN SODIUM 5000 [USP'U]/ML
INJECTION, SOLUTION INTRAVENOUS; SUBCUTANEOUS
Status: DISCONTINUED | OUTPATIENT
Start: 2022-05-12 | End: 2022-05-12 | Stop reason: HOSPADM

## 2022-05-12 RX ORDER — ONDANSETRON 2 MG/ML
4 INJECTION INTRAMUSCULAR; INTRAVENOUS DAILY PRN
Status: DISCONTINUED | OUTPATIENT
Start: 2022-05-12 | End: 2022-05-12 | Stop reason: HOSPADM

## 2022-05-12 RX ORDER — PHENYLEPHRINE HCL IN 0.9% NACL 1 MG/10 ML
SYRINGE (ML) INTRAVENOUS
Status: DISCONTINUED | OUTPATIENT
Start: 2022-05-12 | End: 2022-05-12

## 2022-05-12 RX ORDER — METRONIDAZOLE 500 MG/100ML
INJECTION, SOLUTION INTRAVENOUS
Status: DISCONTINUED | OUTPATIENT
Start: 2022-05-12 | End: 2022-05-12

## 2022-05-12 RX ORDER — HALOPERIDOL 5 MG/ML
0.5 INJECTION INTRAMUSCULAR EVERY 10 MIN PRN
Status: DISCONTINUED | OUTPATIENT
Start: 2022-05-12 | End: 2022-05-12 | Stop reason: HOSPADM

## 2022-05-12 RX ORDER — ADHESIVE BANDAGE
30 BANDAGE TOPICAL 2 TIMES DAILY
Status: DISCONTINUED | OUTPATIENT
Start: 2022-05-12 | End: 2022-05-13 | Stop reason: HOSPADM

## 2022-05-12 RX ORDER — CEFAZOLIN SODIUM/WATER 2 G/20 ML
SYRINGE (ML) INTRAVENOUS
Status: DISCONTINUED | OUTPATIENT
Start: 2022-05-12 | End: 2022-05-12

## 2022-05-12 RX ORDER — SODIUM CHLORIDE 0.9 % (FLUSH) 0.9 %
10 SYRINGE (ML) INJECTION
Status: DISCONTINUED | OUTPATIENT
Start: 2022-05-12 | End: 2022-05-12

## 2022-05-12 RX ORDER — GABAPENTIN 300 MG/1
300 CAPSULE ORAL ONCE
Status: COMPLETED | OUTPATIENT
Start: 2022-05-12 | End: 2022-05-12

## 2022-05-12 RX ORDER — ATROPA BELLADONNA AND OPIUM 16.2; 6 MG/1; MG/1
60 SUPPOSITORY RECTAL EVERY 6 HOURS PRN
Status: DISCONTINUED | OUTPATIENT
Start: 2022-05-12 | End: 2022-05-13 | Stop reason: HOSPADM

## 2022-05-12 RX ORDER — LIDOCAINE HYDROCHLORIDE 20 MG/ML
INJECTION, SOLUTION EPIDURAL; INFILTRATION; INTRACAUDAL; PERINEURAL
Status: DISCONTINUED | OUTPATIENT
Start: 2022-05-12 | End: 2022-05-12

## 2022-05-12 RX ORDER — ONDANSETRON 2 MG/ML
INJECTION INTRAMUSCULAR; INTRAVENOUS
Status: DISPENSED
Start: 2022-05-12 | End: 2022-05-13

## 2022-05-12 RX ORDER — ACETAMINOPHEN 500 MG
1000 TABLET ORAL EVERY 6 HOURS
Status: DISCONTINUED | OUTPATIENT
Start: 2022-05-12 | End: 2022-05-13 | Stop reason: HOSPADM

## 2022-05-12 RX ORDER — HYDROMORPHONE HYDROCHLORIDE 1 MG/ML
0.4 INJECTION, SOLUTION INTRAMUSCULAR; INTRAVENOUS; SUBCUTANEOUS
Status: DISCONTINUED | OUTPATIENT
Start: 2022-05-12 | End: 2022-05-13 | Stop reason: HOSPADM

## 2022-05-12 RX ORDER — FLUTICASONE PROPIONATE 50 MCG
1 SPRAY, SUSPENSION (ML) NASAL DAILY PRN
Status: DISCONTINUED | OUTPATIENT
Start: 2022-05-12 | End: 2022-05-12

## 2022-05-12 RX ORDER — CELECOXIB 200 MG/1
200 CAPSULE ORAL ONCE
Status: COMPLETED | OUTPATIENT
Start: 2022-05-12 | End: 2022-05-12

## 2022-05-12 RX ORDER — HYDROMORPHONE HYDROCHLORIDE 1 MG/ML
0.2 INJECTION, SOLUTION INTRAMUSCULAR; INTRAVENOUS; SUBCUTANEOUS EVERY 5 MIN PRN
Status: DISCONTINUED | OUTPATIENT
Start: 2022-05-12 | End: 2022-05-12 | Stop reason: HOSPADM

## 2022-05-12 RX ORDER — ACETAMINOPHEN 10 MG/ML
1000 INJECTION, SOLUTION INTRAVENOUS ONCE
Status: COMPLETED | OUTPATIENT
Start: 2022-05-12 | End: 2022-05-12

## 2022-05-12 RX ORDER — ATROPINE SULFATE 0.1 MG/ML
INJECTION INTRAVENOUS
Status: DISCONTINUED | OUTPATIENT
Start: 2022-05-12 | End: 2022-05-12

## 2022-05-12 RX ORDER — NALOXONE HCL 0.4 MG/ML
0.02 VIAL (ML) INJECTION
Status: DISCONTINUED | OUTPATIENT
Start: 2022-05-12 | End: 2022-05-13 | Stop reason: HOSPADM

## 2022-05-12 RX ORDER — PROPOFOL 10 MG/ML
VIAL (ML) INTRAVENOUS
Status: DISCONTINUED | OUTPATIENT
Start: 2022-05-12 | End: 2022-05-12

## 2022-05-12 RX ORDER — SENNOSIDES 8.6 MG/1
8.6 TABLET ORAL 2 TIMES DAILY
Status: DISCONTINUED | OUTPATIENT
Start: 2022-05-12 | End: 2022-05-13 | Stop reason: HOSPADM

## 2022-05-12 RX ORDER — MUPIROCIN 20 MG/G
OINTMENT TOPICAL 2 TIMES DAILY
Status: DISCONTINUED | OUTPATIENT
Start: 2022-05-12 | End: 2022-05-13 | Stop reason: HOSPADM

## 2022-05-12 RX ORDER — BUPIVACAINE HYDROCHLORIDE 2.5 MG/ML
INJECTION, SOLUTION EPIDURAL; INFILTRATION; INTRACAUDAL
Status: DISCONTINUED | OUTPATIENT
Start: 2022-05-12 | End: 2022-05-12 | Stop reason: HOSPADM

## 2022-05-12 RX ORDER — ACETAMINOPHEN 10 MG/ML
INJECTION, SOLUTION INTRAVENOUS
Status: DISCONTINUED | OUTPATIENT
Start: 2022-05-12 | End: 2022-05-12

## 2022-05-12 RX ORDER — NEOSTIGMINE METHYLSULFATE 0.5 MG/ML
INJECTION, SOLUTION INTRAVENOUS
Status: DISCONTINUED | OUTPATIENT
Start: 2022-05-12 | End: 2022-05-12

## 2022-05-12 RX ORDER — LIDOCAINE HYDROCHLORIDE 10 MG/ML
1 INJECTION, SOLUTION EPIDURAL; INFILTRATION; INTRACAUDAL; PERINEURAL ONCE
Status: DISCONTINUED | OUTPATIENT
Start: 2022-05-12 | End: 2022-05-12

## 2022-05-12 RX ORDER — OXYCODONE HYDROCHLORIDE 10 MG/1
10 TABLET ORAL EVERY 4 HOURS PRN
Status: DISCONTINUED | OUTPATIENT
Start: 2022-05-12 | End: 2022-05-13 | Stop reason: HOSPADM

## 2022-05-12 RX ORDER — KETAMINE HCL IN 0.9 % NACL 50 MG/5 ML
SYRINGE (ML) INTRAVENOUS
Status: DISCONTINUED | OUTPATIENT
Start: 2022-05-12 | End: 2022-05-12

## 2022-05-12 RX ORDER — OXYCODONE HYDROCHLORIDE 5 MG/1
5 TABLET ORAL EVERY 4 HOURS PRN
Status: DISCONTINUED | OUTPATIENT
Start: 2022-05-12 | End: 2022-05-13 | Stop reason: HOSPADM

## 2022-05-12 RX ORDER — FENTANYL CITRATE 50 UG/ML
INJECTION, SOLUTION INTRAMUSCULAR; INTRAVENOUS
Status: DISCONTINUED | OUTPATIENT
Start: 2022-05-12 | End: 2022-05-12

## 2022-05-12 RX ORDER — DEXAMETHASONE SODIUM PHOSPHATE 4 MG/ML
INJECTION, SOLUTION INTRA-ARTICULAR; INTRALESIONAL; INTRAMUSCULAR; INTRAVENOUS; SOFT TISSUE
Status: DISCONTINUED | OUTPATIENT
Start: 2022-05-12 | End: 2022-05-12

## 2022-05-12 RX ORDER — SODIUM CHLORIDE, SODIUM LACTATE, POTASSIUM CHLORIDE, CALCIUM CHLORIDE 600; 310; 30; 20 MG/100ML; MG/100ML; MG/100ML; MG/100ML
INJECTION, SOLUTION INTRAVENOUS CONTINUOUS
Status: DISCONTINUED | OUTPATIENT
Start: 2022-05-12 | End: 2022-05-13 | Stop reason: HOSPADM

## 2022-05-12 RX ORDER — HEPARIN SODIUM 5000 [USP'U]/ML
5000 INJECTION, SOLUTION INTRAVENOUS; SUBCUTANEOUS EVERY 8 HOURS
Status: DISCONTINUED | OUTPATIENT
Start: 2022-05-12 | End: 2022-05-12

## 2022-05-12 RX ORDER — ROCURONIUM BROMIDE 10 MG/ML
INJECTION, SOLUTION INTRAVENOUS
Status: DISCONTINUED | OUTPATIENT
Start: 2022-05-12 | End: 2022-05-12

## 2022-05-12 RX ORDER — ONDANSETRON 2 MG/ML
4 INJECTION INTRAMUSCULAR; INTRAVENOUS EVERY 6 HOURS PRN
Status: DISCONTINUED | OUTPATIENT
Start: 2022-05-12 | End: 2022-05-13 | Stop reason: HOSPADM

## 2022-05-12 RX ORDER — ACETAMINOPHEN 500 MG
1000 TABLET ORAL ONCE
Status: COMPLETED | OUTPATIENT
Start: 2022-05-12 | End: 2022-05-12

## 2022-05-12 RX ORDER — PROCHLORPERAZINE EDISYLATE 5 MG/ML
5 INJECTION INTRAMUSCULAR; INTRAVENOUS EVERY 6 HOURS PRN
Status: DISCONTINUED | OUTPATIENT
Start: 2022-05-12 | End: 2022-05-13 | Stop reason: HOSPADM

## 2022-05-12 RX ORDER — HALOPERIDOL 5 MG/ML
INJECTION INTRAMUSCULAR
Status: COMPLETED
Start: 2022-05-12 | End: 2022-05-12

## 2022-05-12 RX ORDER — SCOLOPAMINE TRANSDERMAL SYSTEM 1 MG/1
1 PATCH, EXTENDED RELEASE TRANSDERMAL
Status: DISCONTINUED | OUTPATIENT
Start: 2022-05-12 | End: 2022-05-12

## 2022-05-12 RX ADMIN — ATROPINE SULFATE 0.1 MG: 0.1 INJECTION PARENTERAL at 04:05

## 2022-05-12 RX ADMIN — ROCURONIUM BROMIDE 60 MG: 10 INJECTION INTRAVENOUS at 12:05

## 2022-05-12 RX ADMIN — PROPOFOL 20 MG: 10 INJECTION, EMULSION INTRAVENOUS at 04:05

## 2022-05-12 RX ADMIN — GLYCOPYRROLATE 0.4 MG: 0.2 INJECTION INTRAMUSCULAR; INTRAVENOUS at 04:05

## 2022-05-12 RX ADMIN — SCOPALAMINE 1 PATCH: 1 PATCH, EXTENDED RELEASE TRANSDERMAL at 09:05

## 2022-05-12 RX ADMIN — Medication 2 G: at 12:05

## 2022-05-12 RX ADMIN — ROCURONIUM BROMIDE 20 MG: 10 INJECTION INTRAVENOUS at 02:05

## 2022-05-12 RX ADMIN — ACETAMINOPHEN 1000 MG: 10 INJECTION INTRAVENOUS at 03:05

## 2022-05-12 RX ADMIN — GLYCOPYRROLATE 0.2 MG: 0.2 INJECTION, SOLUTION INTRAMUSCULAR; INTRAVENOUS at 05:05

## 2022-05-12 RX ADMIN — ONDANSETRON 4 MG: 2 INJECTION INTRAMUSCULAR; INTRAVENOUS at 05:05

## 2022-05-12 RX ADMIN — DEXAMETHASONE SODIUM PHOSPHATE 4 MG: 4 INJECTION INTRA-ARTICULAR; INTRALESIONAL; INTRAMUSCULAR; INTRAVENOUS; SOFT TISSUE at 12:05

## 2022-05-12 RX ADMIN — Medication 100 MCG: at 03:05

## 2022-05-12 RX ADMIN — PROPOFOL 150 MG: 10 INJECTION, EMULSION INTRAVENOUS at 12:05

## 2022-05-12 RX ADMIN — ACETAMINOPHEN 1000 MG: 500 TABLET ORAL at 09:05

## 2022-05-12 RX ADMIN — Medication 10 MG: at 12:05

## 2022-05-12 RX ADMIN — ACETAMINOPHEN 1000 MG: 10 INJECTION INTRAVENOUS at 07:05

## 2022-05-12 RX ADMIN — NEOSTIGMINE METHYLSULFATE 5 MG: 0.5 INJECTION, SOLUTION INTRAVENOUS at 04:05

## 2022-05-12 RX ADMIN — HYDROMORPHONE HYDROCHLORIDE 0.2 MG: 1 INJECTION, SOLUTION INTRAMUSCULAR; INTRAVENOUS; SUBCUTANEOUS at 05:05

## 2022-05-12 RX ADMIN — SODIUM CHLORIDE, SODIUM GLUCONATE, SODIUM ACETATE, POTASSIUM CHLORIDE, MAGNESIUM CHLORIDE, SODIUM PHOSPHATE, DIBASIC, AND POTASSIUM PHOSPHATE: .53; .5; .37; .037; .03; .012; .00082 INJECTION, SOLUTION INTRAVENOUS at 12:05

## 2022-05-12 RX ADMIN — PROPOFOL 30 MG: 10 INJECTION, EMULSION INTRAVENOUS at 04:05

## 2022-05-12 RX ADMIN — ROCURONIUM BROMIDE 20 MG: 10 INJECTION INTRAVENOUS at 12:05

## 2022-05-12 RX ADMIN — METRONIDAZOLE 500 MG: 500 INJECTION, SOLUTION INTRAVENOUS at 01:05

## 2022-05-12 RX ADMIN — SODIUM CHLORIDE, SODIUM LACTATE, POTASSIUM CHLORIDE, AND CALCIUM CHLORIDE: .6; .31; .03; .02 INJECTION, SOLUTION INTRAVENOUS at 05:05

## 2022-05-12 RX ADMIN — Medication 30 MG: at 12:05

## 2022-05-12 RX ADMIN — SODIUM CHLORIDE: 0.9 INJECTION, SOLUTION INTRAVENOUS at 12:05

## 2022-05-12 RX ADMIN — ACETAMINOPHEN 1000 MG: 500 TABLET ORAL at 11:05

## 2022-05-12 RX ADMIN — GLYCOPYRROLATE 0.2 MG: 0.2 INJECTION INTRAMUSCULAR; INTRAVENOUS at 12:05

## 2022-05-12 RX ADMIN — MIDAZOLAM 2 MG: 1 INJECTION INTRAMUSCULAR; INTRAVENOUS at 11:05

## 2022-05-12 RX ADMIN — HALOPERIDOL LACTATE 0.5 MG: 5 INJECTION, SOLUTION INTRAMUSCULAR at 05:05

## 2022-05-12 RX ADMIN — ROCURONIUM BROMIDE 10 MG: 10 INJECTION INTRAVENOUS at 03:05

## 2022-05-12 RX ADMIN — ONDANSETRON 4 MG: 2 INJECTION INTRAMUSCULAR; INTRAVENOUS at 11:05

## 2022-05-12 RX ADMIN — LIDOCAINE HYDROCHLORIDE 100 MG: 20 INJECTION, SOLUTION EPIDURAL; INFILTRATION; INTRACAUDAL at 12:05

## 2022-05-12 RX ADMIN — HALOPERIDOL 0.5 MG: 5 INJECTION INTRAMUSCULAR at 05:05

## 2022-05-12 RX ADMIN — GABAPENTIN 300 MG: 300 CAPSULE ORAL at 09:05

## 2022-05-12 RX ADMIN — Medication 10 MG: at 02:05

## 2022-05-12 RX ADMIN — CELECOXIB 200 MG: 200 CAPSULE ORAL at 09:05

## 2022-05-12 RX ADMIN — Medication 100 MCG: at 12:05

## 2022-05-12 RX ADMIN — FENTANYL CITRATE 100 MCG: 50 INJECTION INTRAMUSCULAR; INTRAVENOUS at 12:05

## 2022-05-12 RX ADMIN — FENTANYL CITRATE 50 MCG: 50 INJECTION INTRAMUSCULAR; INTRAVENOUS at 04:05

## 2022-05-12 NOTE — ANESTHESIA PREPROCEDURE EVALUATION
2022  Pre-operative evaluation for Procedure(s) (LRB):  XI ROBOTIC LAPAROSCOPY,DIAGNOSTIC (N/A)  SALPINGO-OOPHORECTOMY (Bilateral)    Cristina Sprague is a 46 y.o. female     Patient Active Problem List   Diagnosis    Supervision of other normal pregnancy    History of     DVT (deep venous thrombosis)    Obesity    Rh negative status during pregnancy    Ectopic pregnancy    Anxiety disorder, unspecified    History of pulmonary embolism    History of deep venous thrombosis    Chronic instability of knee    Contact with and (suspected) exposure to other viral communicable diseases    Depressive disorder    Knee pain    Lumbar radiculopathy    Osteoarthritis of knee    Screening for other specific viral and chlamydial diseases    Sprain of MCL (medial collateral ligament) of knee    COVID-19    History of right knee joint replacement    Decreased strength, endurance, and mobility    Decreased range of motion (ROM) of knee       Review of patient's allergies indicates:  No Known Allergies    No current facility-administered medications on file prior to encounter.     Current Outpatient Medications on File Prior to Encounter   Medication Sig Dispense Refill    acetaminophen (TYLENOL ARTHRITIS ORAL) Take by mouth.      ELIQUIS 5 mg Tab Take 5 mg by mouth 2 (two) times daily.  11    esomeprazole magnesium (NEXIUM ORAL) Nexium Take No date recorded No form recorded No frequency recorded No route recorded No set duration recorded No set duration amount recorded active No dosage strength recorded No dosage strength units of measure recorded      fluticasone propionate (FLONASE) 50 mcg/actuation nasal spray SPRAY 1 SPRAY IN EACH NOSTRIL ONCE DAILY (Patient taking differently: 1 spray by Each Nostril route daily as needed.) 16 mL 3    multivitamin capsule Take 1 capsule by mouth  once daily.         Past Surgical History:   Procedure Laterality Date    BARIATRIC SURGERY       SECTION      x2    CHOLECYSTECTOMY      ASUNCION FILTER PLACEMENT  2018    KNEE ARTHROPLASTY Right 2021    Procedure: ARTHROPLASTY, KNEE;  Surgeon: Storm See MD;  Location: [facility];  Service: Orthopedics;  Laterality: Right;  depuy notified  CC  Depuy confirmed 21 AM    LUMBAR FUSION  2018    L4-L5    TUBAL LIGATION         Social History     Socioeconomic History    Marital status: Single   Tobacco Use    Smoking status: Never Smoker    Smokeless tobacco: Never Used   Substance and Sexual Activity    Alcohol use: No    Drug use: No    Sexual activity: Not Currently     Partners: Male     Birth control/protection: Surgical         CBC: No results for input(s): WBC, RBC, HGB, HCT, PLT, MCV, MCH, MCHC in the last 72 hours.    CMP: No results for input(s): NA, K, CL, CO2, BUN, CREATININE, GLU, MG, PHOS, CALCIUM, ALBUMIN, PROT, ALKPHOS, ALT, AST, BILITOT in the last 72 hours.    INR  No results for input(s): PT, INR, PROTIME, APTT in the last 72 hours.        Diagnostic Studies:      EKG:  Sinus bradycardia with sinus arrhythmia   Right bundle branch block   Abnormal ECG   When compared with ECG of 2021 13:18,   No significant change was found   Confirmed by Yesica JIMENEZ, [facility] ([id number]) on 2022 9:02:47    2D Echo:  No results found for this or any previous visit.        Pre-op Assessment    I have reviewed the Patient Summary Reports.     I have reviewed the Nursing Notes. I have reviewed the NPO Status.      Review of Systems  Anesthesia Hx:  Personal Hx of Anesthesia complications, Post-Operative Nausea/Vomiting, in the past, but not with recent anesthetics / prophylaxis   Hematology/Oncology:        Hematology Comments: MHTFR mutation w/ hx of PE, IVC filter in place, on eliquis   Cardiovascular:  Functional Capacity good / => 4 METS    Pulmonary:  Pulmonary Embolism,  > 6 months ago, saddle embolus, currently on enoxaparin tx    Renal/:  Neoplasm/Tumor (adnexal mass).    Hepatic/GI:   S/p gastric sleeve   Musculoskeletal:   Arthritis  S/p L4-5 fusion Spine Disorders: lumbar Disc disease    Endocrine:  Obesity / BMI > 30  Psych:   anxiety depression          Physical Exam  General: Well nourished, Cooperative and Alert    Airway:  Mallampati: II / I  Mouth Opening: Normal  Neck ROM: Normal ROM    Dental:  Intact    Heart:  Rate: Bradycardia        Anesthesia Plan  Type of Anesthesia, risks & benefits discussed:    Anesthesia Type: Gen ETT  Intra-op Monitoring Plan: Standard ASA Monitors  Post Op Pain Control Plan: multimodal analgesia and IV/PO Opioids PRN  Airway Plan: Direct, Post-Induction  Informed Consent: Informed consent signed with the Patient and all parties understand the risks and agree with anesthesia plan.  All questions answered.   ASA Score: 3    Ready For Surgery From Anesthesia Perspective.     .

## 2022-05-12 NOTE — TELEPHONE ENCOUNTER
----- Message from Luana Rain sent at 5/11/2022  4:41 PM CDT -----  Regarding: Requesting a call back  Contact: KELSEY RODNEY [[id number]]  Name of Who is Calling:KELSEY RODNEY [[id number]]          What is the request in detail:Pt states she may have been exposed to covid, the people she was with over the weekend has tested positive. She is requesting a call back to consult  . Please advise           Can the clinic reply by [facility]:No           What Number to Call Back if not in [facility]: [phone]

## 2022-05-12 NOTE — TRANSFER OF CARE
"Anesthesia Transfer of Care Note    Patient: Cristina MCKEON [place]    Procedure(s) Performed: Procedure(s) (LRB):  XI ROBOTIC LAPAROSCOPY,DIAGNOSTIC (N/A)  SALPINGO-OOPHORECTOMY (Right)  SALPINGECTOMY (Left)  CYSTECTOMY (Left)  XI ROBOTIC LYSIS, ADHESIONS  APPENDECTOMY, LAPAROSCOPIC (N/A)  XI ROBOTIC HYSTERECTOMY    Patient location: PACU    Anesthesia Type: general    Transport from OR: Transported from OR on 6-10 L/min O2 by face mask with adequate spontaneous ventilation    Post pain: adequate analgesia    Post assessment: no apparent anesthetic complications    Post vital signs: stable    Level of consciousness: sedated and responds to stimulation    Nausea/Vomiting: no nausea/vomiting    Complications: none    Transfer of care protocol was followed      Last vitals:   Visit Vitals  /77 (BP Location: Right arm, Patient Position: Lying)   Pulse (!) 42   Temp 36.3 °C (97.3 °F) (Temporal)   Resp 14   Ht 5' 5" (1.651 m)   Wt 96.9 kg (213 lb 9.6 oz)   SpO2 100%   Breastfeeding No   BMI 35.54 kg/m²     "

## 2022-05-12 NOTE — H&P
The H&P has been reviewed and the patient has been examined. No changes since the H&P was written.     Anesthesia/surgery risks and benefits were discussed and the patient voiced understanding.     Ying Henderson MD  OBGYN, PGY-4      REFERRING PROVIDER  Delma Roach MD      HISTORY OF PRESENT CONDITION  CC: Adnexal Mass  Cristina Sprague is a 46 y.o.  who presents in consultation at for an opinion regarding right adnexal mass.      Denies any fever, chills, chest pain, SOB, N/V/D, dysuria, urgency, changes in appetite, unintentional weight loss, or pelvic pain. She does report she feels as though she cannot empty her bladder completely.      REVIEW OF SYSTEMS  All systems reviewed and negative except as noted in HPI.     OBJECTIVE       Vitals:     22 1330   BP: 115/65   Pulse: (!) 56      Body mass index is 36.24 kg/m².      1. General: Well appearing, no apparent distress, alert and oriented.  2. Lymph: Neck symmetric without cervical or supraclavicular adenopathy or mass.  3. Lungs: Normal respiratory rate, no accessory muscle use.  4. Cardiac: Normal rate  5. Psych: Normal affect.  6. Abdomen:  non-distended, soft, non-tender,  no ascites, no hepatosplenomegaly.  7. Skin: Warm, dry, no rashes or lesions.   8. Extremities: Bilateral lower extremities without edema or tenderness.  9. Genitourinary               Pelvic Examination including:                a. External genitalia are normal in appearance. No lesions noted.               b. Urethral meatus is normal size, location, and appearance.               c. Urethra is negative.               d. Bladder is nontender. No masses noted.               e. Vagina has normal mucosa with physiologic discharge. No lesions noted.              f. Uterus with a midline cervix that is normal to palpation              g. Adnexa fullness in the left adnexa, smooth, mobile.         ECOG status: 1     LABORATORY DATA  Lab data reviewed.     RADIOLOGICAL  DATA  Radiology data reviewed.     PATHOLOGY DATA  Pathology data reviewed.     ASSESSMENT / PLAN      1. Adnexal mass    2. History of pulmonary embolism    3. MTHFR mutation    4. Anticoagulant long-term use    5. Class 1 obesity in adult, unspecified BMI, unspecified obesity type, unspecified whether serious comorbidity present       PSH: Gastric sleeve, Lap cholecystectomy, C/S x 2 (L-trans), IVC filter (still in place).  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      12/3/21: CA-125 = 11  4/6/21: Hb 12, Plt 287, Cr 0.8  2/18/21: Pelvic US: 10 cm uterus; L complex adnexal mass 8 cm  11/21/21: Pelvic US: L complex adnexal mass 8 cm     F/U CT A/P  F/U CXR  F/U EKG  F/U PCP clearance and Eliquis recommendations     I discussed with the patient that the primary treatment for an adnexal mass is observation versus surgical management.  Surgical management will depend on the use of frozen pathology.  Differential diagnoses includes a benign, borderline, or malignant mass.  I will plan to perform this in a robotic fashion.  I discussed with the patient the role of staging for borderline frozen pathology and debulking for ovarian cancer but that we would defer the later.  The procedure and its risks and benefits were discussed in detail.     23H OBSERVATION  NO NSAIDs     PATIENT EDUCATION  Ready to learn, no apparent learning barriers were identified; learning preferences include listening.  Explained diagnosis and treatment plan; patient expressed understanding of the content.     INFORMED CONSENT  Discussed the risks, benefits, and alternatives of the procedure and of possible blood transfusion.  Discussed the necessity of other members of the healthcare team participating in the procedure.  All questions answered and consent given.

## 2022-05-12 NOTE — OP NOTE
[facility] - [facility] (2nd Fl)    Procedure(s) (LRB):  XI ROBOTIC HYSTERECTOMY  URETEROLYSIS (Right)  SALPINGO-OOPHORECTOMY (Right)  SALPINGECTOMY (Left)  CYSTECTOMY (Left)  XI ROBOTIC LYSIS, ADHESIONS  APPENDECTOMY, LAPAROSCOPIC (N/A)    DATE OF SURGERY  5/12/2022     Surgeon(s) and Role  Primary: Aurelio Inman MD  Resident - Assisting: Ying Henderson MD; Consuelo Ervin MD     ANESTHESIA TYPE  General     PRE-OPERATIVE DIAGNOSIS  Adnexal mass [N94.89]  History of pulmonary embolism [Z86.711]  MTHFR mutation [Z15.89]    POST-OPERATIVE DIAGNOSIS  Post-Op Diagnosis Codes:     * Adnexal mass [N94.89]     * History of pulmonary embolism [Z86.711]     * MTHFR mutation [Z15.89]    FINDINGS  Normal diaphragms.  Normal liver capsule.  Normal omentum.  Filmy but extremely abundant adhesions between the sigmoid colon, right adnexa, and cul-de-sac.  Dense bladder adhesions.  In total, we spent approximately 45 minutes lysing adhesions with cautery and blunt dissection.  10 cm complex right adnexal mass that was mostly retroperitoneal.  It was very proximal to the ureter and the ureter had to be lysed in usual fashion past the ovary due to retroperitoneal fibrosis and inflammation.  The mass was multi-loculated with a thin capsule.  It ruptured with manipulation and had serous contents.  Enlarged and boggy uterus with minimal mobility.  Normal left tube.  Normal ovary with simple appearing cysts.  Normal cul-de-sac.  During lysis of adhesions, the appendix was cauterized and this was inherent to the procedure.  The decision was made to proceed with an indicated appendectomy.  The left para-median trocar was up sized to a 12 mm laparoscopic trocar and the appendix was removed in usual fashion with a 45 mm white stapler.  Hemoblast was applied to the vaginal cuff and left ovary.  All frozen pathology was benign.    Procedure in Detail: The patient was prepped and draped in synchronous position in [facility].  After sterile prep and drape the Leavitt catheter was inserted. Gloves and gowns were changed, and we began by directly entering the abdomen.  A small incision was made 2 cm above the umbilicus using a Hossan trocar, the peritoneal cavity was entered, and a pneumoperitoneum was established including trocar placement.  The laparoscope was inserted and examination of the peritoneal cavity revealed the operative findings above. The remaining 3 robotic ports and assistant port were placed under direct vision and the robot was docked.  We tediously lysed adhesions to restore her anatomy.  This involved lysing the sigmoid colon out the cul-de-sac and the cecum off the right pelvic sidewall.  It was performed with a combination of cautery and blunt dissection.  There was no obvious injury to the colon but there was concern for a cautery injury to the appendix which was inherent to the procedure.  This was addressed later in the procedure.  Once her anatomy was normalized, we turned our attention to the uterus and ovaries.  Beginning on the right side, the ureter was identified, and the ovarian vessels were sealed and transected using bipolar coagulation.  The mass was densely adhered to the right pelvic sidewall and due to proximity of the ureter and retroperitoneal fibrosis due to inflammation, the ureter was lysed in usual fashion to the level of the uterine vessels.  The left ovary was normal in appearance so the decision was made to conserve it.  The mass was the freed from the pelvic sidewall with a combination of cautery and blunt dissection.  There were 2 cysts which were removed in usual fashion with cautery.  The left ureter was identified and the uteroovarian ligament was sealed and transected using bipolar coagulation.  The bladder flap was then developed and this took longer than usual due to dense bladder adhesions.  Ultimately, we were able to reflect the bladder 2 cm past the external cervical os.  The  cardinal ligaments were skeletonized, the ureter visualized laterally, and the uterine vessels were ligated and divided using bipolar coagulation.  The bladder flap was developed and the uterosacral ligaments were transected.   The vagina was incised, and a circumferential incision was made.  The tubes, right ovariy, uterus, and cervix were removed through the vagina.  The vagina was closed in a double fashion using Stratafix suture, incorporating the uterosacral ligaments into the cuff closure. The ureters were followed from the pelvic brim to the vaginal cuff closure, and were not compromised.  Hemoblast was applied to the vaginal cuff and left ovary.  Hemostasis was satisfactory.  We then turned our attention to the appendix.  The mesoappendix was coagulated and cut with bipolar coagulation.  The robot was then undocked and the assistant port and left paramedian port was up sized to 12 mm.  An appendectomy was then performed in usual fashion with a 45 mm laparoscopic stapler with a white load.  The staple line was healthy and intact.  The trocars were removed, and the fascia of assistant port and left paramedian port was closed with a Rambo-Atkins using Vicryl suture. The camera port was closed with Vicryl suture. The skin of all sites was closed with 4-0 Monocryl.  The patient was taken to recovery in stable condition.    Salt, needle, sponge, and instrument count was correct.  I was present and scrubbed for the entirety of the case.     ASSISTANT SURGEONS  Radha Alcantara's presence was critical to the completion of this case due to a qualified resident not being available.    UNUSUAL CIRCUMSTANCES  Yes. Significant scaring or adhesions which distorted her anatomy and required extensive lysis in addition to making the procedure significantly more difficult to perform.    CONDITION  chronic    POST-OP COMPLICATION  No    DIABETIC  No    SPECIMENS  Specimens (From admission, onward)    None           DRAINS        Urethral Catheter 05/12/22 1225 Non-latex 16 Fr. (Active)        ESTIMATED BLOOD LOSS  300 cc            IMPLANTS  N

## 2022-05-12 NOTE — ANESTHESIA PROCEDURE NOTES
Intubation    Date/Time: 5/12/2022 12:05 PM  Performed by: Suleiman Melissa III, MD  Authorized by: Suleiman Melissa III, MD     Intubation:     Induction:  Intravenous    Intubated:  Postinduction    Mask Ventilation:  Easy mask    Attempts:  1    Attempted By:  Staff anesthesiologist    Method of Intubation:  Direct    Blade:  Lianne 3    Laryngeal View Grade: Grade I - full view of cords      Difficult Airway Encountered?: No      Complications:  None    Airway Device:  Oral endotracheal tube    Airway Device Size:  7.5    Style/Cuff Inflation:  Cuffed (inflated to minimal occlusive pressure)    Inflation Amount (mL):  5    Tube secured:  21    Secured at:  The lips    Placement Verified By:  Capnometry    Complicating Factors:  None    Findings Post-Intubation:  BS equal bilateral

## 2022-05-12 NOTE — PROGRESS NOTES
BP and heart rate noted to be low. Attempted to notify KAREN Melsisa MD. Dr Mendoza notified and will be in to see pt.

## 2022-05-13 ENCOUNTER — TELEPHONE (OUTPATIENT)
Dept: GYNECOLOGIC ONCOLOGY | Facility: CLINIC | Age: 47
End: 2022-05-13

## 2022-05-13 VITALS
HEART RATE: 53 BPM | DIASTOLIC BLOOD PRESSURE: 66 MMHG | WEIGHT: 218.06 LBS | TEMPERATURE: 99 F | SYSTOLIC BLOOD PRESSURE: 130 MMHG | HEIGHT: 65 IN | RESPIRATION RATE: 19 BRPM | OXYGEN SATURATION: 98 % | BODY MASS INDEX: 36.33 KG/M2

## 2022-05-13 PROCEDURE — 51798 US URINE CAPACITY MEASURE: CPT

## 2022-05-13 PROCEDURE — 99024 PR POST-OP FOLLOW-UP VISIT: ICD-10-PCS | Mod: ,,, | Performed by: OBSTETRICS & GYNECOLOGY

## 2022-05-13 PROCEDURE — 25000003 PHARM REV CODE 250: Performed by: STUDENT IN AN ORGANIZED HEALTH CARE EDUCATION/TRAINING PROGRAM

## 2022-05-13 PROCEDURE — 99024 POSTOP FOLLOW-UP VISIT: CPT | Mod: ,,, | Performed by: OBSTETRICS & GYNECOLOGY

## 2022-05-13 RX ORDER — ENOXAPARIN SODIUM 100 MG/ML
40 INJECTION SUBCUTANEOUS EVERY 24 HOURS
Status: DISCONTINUED | OUTPATIENT
Start: 2022-05-13 | End: 2022-05-13 | Stop reason: HOSPADM

## 2022-05-13 RX ORDER — ACETAMINOPHEN 325 MG/1
650 TABLET ORAL EVERY 6 HOURS PRN
Qty: 30 TABLET | Refills: 2 | Status: SHIPPED | OUTPATIENT
Start: 2022-05-13 | End: 2022-11-18

## 2022-05-13 RX ORDER — OXYCODONE HYDROCHLORIDE 5 MG/1
5 TABLET ORAL EVERY 4 HOURS PRN
Qty: 10 TABLET | Refills: 0 | Status: SHIPPED | OUTPATIENT
Start: 2022-05-13 | End: 2022-06-24 | Stop reason: ALTCHOICE

## 2022-05-13 RX ADMIN — MAGNESIUM HYDROXIDE 2400 MG: 400 SUSPENSION ORAL at 08:05

## 2022-05-13 RX ADMIN — OXYCODONE 5 MG: 5 TABLET ORAL at 05:05

## 2022-05-13 RX ADMIN — SENNOSIDES 8.6 MG: 8.6 TABLET ORAL at 08:05

## 2022-05-13 RX ADMIN — ACETAMINOPHEN 1000 MG: 500 TABLET ORAL at 05:05

## 2022-05-13 RX ADMIN — MUPIROCIN: 20 OINTMENT TOPICAL at 08:05

## 2022-05-13 NOTE — ANESTHESIA RELEASE NOTE
"Anesthesia Release from PACU Note    Patient: Cristian MCKEON Darcie    Procedure(s) Performed: Procedure(s) (LRB):  XI ROBOTIC LAPAROSCOPY,DIAGNOSTIC (N/A)  SALPINGO-OOPHORECTOMY (Right)  SALPINGECTOMY (Left)  CYSTECTOMY (Left)  XI ROBOTIC LYSIS, ADHESIONS  APPENDECTOMY, LAPAROSCOPIC (N/A)  XI ROBOTIC HYSTERECTOMY    Anesthesia type: general    Post pain: Adequate analgesia    Post assessment: no apparent anesthetic complications, tolerated procedure well and no evidence of recall    Last Vitals:   Visit Vitals  /60 (BP Location: Right arm, Patient Position: Lying)   Pulse (!) 43   Temp 36.5 °C (97.7 °F) (Oral)   Resp 18   Ht 5' 5" (1.651 m)   Wt 96.9 kg (213 lb 9.6 oz)   SpO2 99%   Breastfeeding No   BMI 35.54 kg/m²       Post vital signs: stable    Level of consciousness: awake, alert  and oriented    Nausea/Vomiting: no nausea/no vomiting    Complications: none    Airway Patency: patent    Respiratory: unassisted    Cardiovascular: stable and blood pressure at baseline    Hydration: euvolemic  "

## 2022-05-13 NOTE — PLAN OF CARE
Patient admitted from post-op last evening. Pain controlled with scheduled and PRN analgesics. Patient denied nausea throughout the night. Ambulated to the restroom twice and voided minimal amounts both times. Bladder scan performed and post-void residual was 57mL. No bowel movement overnight. Patient in no distress at this time.

## 2022-05-13 NOTE — PLAN OF CARE
Vital signs stable. Afebrile. Drowsy, oriented and following commands. Pain controlled with PRN and scheduled meds. Nausea controlled with PRN meds. IVF per MD order. Lap sites remain CDI with steristrips intact. ABD pad to vagina. Due to void. HR as low as 42 when asleep but immediately returns to baseline of low 50's when awake. Dr Herrera aware and okay with transfer to floor. MD to place release. POC reviewed and understanding verbalized.

## 2022-05-13 NOTE — DISCHARGE SUMMARY
[facility] - [facility] ([facility])  Gynecology Oncology   Discharge Summary    Patient Name: Cristina Sprague  MRN: [id number]  Admission Date: 5/12/2022  Hospital Length of Stay: 1 days  Discharge Date and Time:  05/13/2022 7:51 AM  Attending Physician: Aurelio Inman MD   Discharging Provider: Consuelo Ervin MD  Primary Care Provider: MIKE Grubbs MD    HPI:   Patient presented for scheduled procedure. Patient was passed back to OR for below scheduled procedure. Please see OP note for further details. Tolerated procedure well and patient was taken to recovery in a stable condition. Prior to discharge patient was able to void, ambulate, tolerate PO and pain was well controlled with PO meds. Patient was given routine post-op instructions for which patient voiced understanding. Patient was subsequently discharged home.      Hospital Course: Discharged home on POD#1 without complication    Procedure(s) (LRB):  XI ROBOTIC LAPAROSCOPY,DIAGNOSTIC (N/A)  SALPINGO-OOPHORECTOMY (Right)  SALPINGECTOMY (Left)  CYSTECTOMY (Left)  XI ROBOTIC LYSIS, ADHESIONS  APPENDECTOMY, LAPAROSCOPIC (N/A)  XI ROBOTIC HYSTERECTOMY     Consults:     Significant Diagnostic Studies: Labs:   CBC   Recent Labs   Lab 05/12/22  1657   WBC 5.83   HGB 10.6*   HCT 34.1*          Pending Diagnostic Studies:     Procedure Component Value Units Date/Time    Creatinine, serum [[id number]] Collected: 05/12/22 2206    Order Status: Sent Lab Status: In process Updated: 05/12/22 2210    Specimen: Blood     Cytology, Fluid/Wash/Brush [[id number]] Collected: 05/12/22 1302    Order Status: Sent Lab Status: In process Updated: 05/12/22 1432    Specimen: Body Fluid     Specimen to Pathology, Surgery Gynecology and Obstetrics [[id number]] Collected: 05/12/22 1618    Order Status: Sent Lab Status: In process Updated: 05/13/22 0121    Specimen: Tissue         Final Active Diagnoses:    Diagnosis Date Noted POA    PRINCIPAL PROBLEM:  s/p  RA-TLH/BS/RO/cystectomy/appendectomy/JOEY [Z90.710] 05/12/2022 No      Problems Resolved During this Admission:        Discharged Condition: good    Disposition: Home or Self Care    Follow Up:   Follow-up Information     Aurelio Inman MD Follow up in 6 week(s).    Specialty: Gynecologic Oncology  Why: post op visit  Contact information:  [address] [address]  [phone]                       Patient Instructions:      Pelvic Rest     Notify your health care provider if you experience any of the following:  temperature >100.4     Notify your health care provider if you experience any of the following:  persistent nausea and vomiting or diarrhea     Notify your health care provider if you experience any of the following:  severe uncontrolled pain     Notify your health care provider if you experience any of the following:  redness, tenderness, or signs of infection (pain, swelling, redness, odor or green/yellow discharge around incision site)     Notify your health care provider if you experience any of the following:  difficulty breathing or increased cough     Notify your health care provider if you experience any of the following:  severe persistent headache     Notify your health care provider if you experience any of the following:  persistent dizziness, light-headedness, or visual disturbances     Notify your health care provider if you experience any of the following:  increased confusion or weakness     Notify your health care provider if you experience any of the following:   Order Comments: Heavy vaginal bleeding >1 pad/hour for greater than 2 hours     Type & Screen   Standing Status: Future Number of Occurrences: 1 Standing Exp. Date: 06/10/23     Activity as tolerated     Medications:  Reconciled Home Medications:      Medication List      CHANGE how you take these medications    fluticasone propionate 50 mcg/actuation nasal spray  Commonly known as: FLONASE  SPRAY 1 SPRAY  IN EACH NOSTRIL ONCE DAILY  What changed: See the new instructions.     * acetaminophen 325 MG tablet  Commonly known as: TYLENOL  Take 2 tablets (650 mg total) by mouth every 6 (six) hours as needed for Pain.  What changed: You were already taking a medication with the same name, and this prescription was added. Make sure you understand how and when to take each.     * TYLENOL ARTHRITIS ORAL  Take by mouth.  What changed: Another medication with the same name was added. Make sure you understand how and when to take each.         * This list has 2 medication(s) that are the same as other medications prescribed for you. Read the directions carefully, and ask your doctor or other care provider to review them with you.            CONTINUE taking these medications    ELIQUIS 5 mg Tab  Generic drug: apixaban  Take 5 mg by mouth 2 (two) times daily.     multivitamin capsule  Take 1 capsule by mouth once daily.     NEXIUM ORAL  Nexium Take No date recorded No form recorded No frequency recorded No route recorded No set duration recorded No set duration amount recorded active No dosage strength recorded No dosage strength units of measure recorded            Consuelo Ervin MD  Obstetrics & Gynecology  [facility] - Oncology ([facility])

## 2022-05-13 NOTE — PLAN OF CARE
POC reviewed with patient, no questions at this time. No acute events. VSS on room air, afebrile. Electrolytes repleted as needed. Ambulates with SBA to the bathroom. Calls appropriately for assistance. Multiple abdominal incisions from GYN surgery, scant old drainage. Pt reports minimal pain.     Discharge education and medication regimen discussed with patient and partner, no questions at this time. Medications delivered from pharmacy. PIV removed prior to discharge. Patient d/c home in private car with no services.

## 2022-05-13 NOTE — PROGRESS NOTES
Progress Note  Gynecology Oncology    Admit Date: 2022  LOS: 1    Reason for Admission:  S/P laparoscopic hysterectomy    SUBJECTIVE:     Cristina Sprague is a 46 y.o.  who is POD#1 s/p RA-TLH/RSO/Left cystectomy/JOEY/appendectomy for the treatment of pelvic mass.  Pt doing well this morning. Pain is well controlled. She is tolerating a regular diet without N/V. Ambulating without difficulty. Voiding without difficulty. Not yet passing flatus. She is not yet having bowel movements.    OBJECTIVE:     Vital Signs   Temp:  [97.3 °F (36.3 °C)-98.2 °F (36.8 °C)] 98.2 °F (36.8 °C)  Pulse:  [41-65] 58  Resp:  [13-21] 17  SpO2:  [99 %-100 %] 99 %  BP: ()/(50-77) 111/61      Intake/Output Summary (Last 24 hours) at 2022 0724  Last data filed at 2022 0508  Gross per 24 hour   Intake 1750 ml   Output 100 ml   Net 1650 ml       Physical Exam:  Gen: A&Ox3, NAD  CV: RRR  Pulm: LCTAB, normal respiratory effort  Abd: soft, non-distended, non-tender to palpation without rebound or guarding  Inc: robotic port sites with steri strips overlying; clean, dry and intact   Ext: PPP, no peripheral edema,TEDs/SCDs in place      Laboratory:  Recent Results (from the past 24 hour(s))   POCT urine pregnancy    Collection Time: 22  9:37 AM   Result Value Ref Range    POC Preg Test, Ur Negative Negative     Acceptable Yes    CBC auto differential    Collection Time: 22  4:57 PM   Result Value Ref Range    WBC 5.83 3.90 - 12.70 K/uL    RBC 3.80 (L) 4.00 - 5.40 M/uL    Hemoglobin 10.6 (L) 12.0 - 16.0 g/dL    Hematocrit 34.1 (L) 37.0 - 48.5 %    MCV 90 82 - 98 fL    MCH 27.9 27.0 - 31.0 pg    MCHC 31.1 (L) 32.0 - 36.0 g/dL    RDW 16.3 (H) 11.5 - 14.5 %    Platelets 164 150 - 450 K/uL    MPV 10.2 9.2 - 12.9 fL    Immature Granulocytes 0.3 0.0 - 0.5 %    Gran # (ANC) 5.0 1.8 - 7.7 K/uL    Immature Grans (Abs) 0.02 0.00 - 0.04 K/uL    Lymph # 0.7 (L) 1.0 - 4.8 K/uL    Mono # 0.1 (L) 0.3 - 1.0 K/uL     Eos # 0.0 0.0 - 0.5 K/uL    Baso # 0.02 0.00 - 0.20 K/uL    nRBC 0 0 /100 WBC    Gran % 85.1 (H) 38.0 - 73.0 %    Lymph % 12.2 (L) 18.0 - 48.0 %    Mono % 2.1 (L) 4.0 - 15.0 %    Eosinophil % 0.0 0.0 - 8.0 %    Basophil % 0.3 0.0 - 1.9 %    Differential Method Automated        Diagnostic Results:  n/a    ASSESSMENT/PLAN:     Active Hospital Problems    Diagnosis  POA    *s/p RA-TLH/BS/RO/cystectomy/appendectomy/JOEY [Z90.710]  No      Resolved Hospital Problems   No resolved problems to display.       Assessment: 46 y.o.  POD#1 s/p RA procedure     Plan:   1. Post-op   - Routine post-op advances  - Continue PRN pain medication  - Encourage ambulation   - Encourage IS  - recorded  cc overnight but patient reports she has voided again since   - Continue IVF until tolerating regular diet.  - Antiemetics prn nausea/vomiting.    2. H/o PE   - on eliquis outpatient   - admitted post op for monitoring and on heparin   - transition back to home eliquis on discharge     Dispo: As patient meets appropriate post-op milestones, plan for discharge to home POD#1-2.      Consuelo Ervin MD   OBGYN  PGY-4

## 2022-05-13 NOTE — TELEPHONE ENCOUNTER
Spoke with our patient about her schedule appointment in gyn oncology she agreed she voiced understanding of the date, time and location. All questions answered appointment mail. MA/REGAN /Preceptor Shaka GIRON

## 2022-05-13 NOTE — NURSING TRANSFER
Nursing Transfer Note      5/12/2022     Reason patient is being transferred: per md order    Transfer to 811    Transfer via stretcher    Transfer with iv pump    Transported by pct x2    Medicines sent: ivf    Any special needs or follow-up needed: n/a    Chart send with patient: yes    Notified: significant other via text    Patient reassessed at: 5/12/22 @ 0293

## 2022-05-13 NOTE — OPERATIVE NOTE ADDENDUM
Certification of Assistant at Surgery       Surgery Date: 5/12/2022     Participating Surgeons:  Surgeon(s) and Role:     * Aurelio Inman MD - Primary     * Ying Henderson MD - Resident - Assisting     * Consuelo Ervin MD - Resident - Assisting    Procedures:  Procedure(s) (LRB):  XI ROBOTIC LAPAROSCOPY,DIAGNOSTIC (N/A)  SALPINGO-OOPHORECTOMY (Right)  SALPINGECTOMY (Left)  CYSTECTOMY (Left)  XI ROBOTIC LYSIS, ADHESIONS  APPENDECTOMY, LAPAROSCOPIC (N/A)  XI ROBOTIC HYSTERECTOMY    Assistant Surgeon's Certification of Necessity:  I understand that section 1842 (b) (6) (d) of the Social Security Act generally prohibits Medicare Part B reasonable charge payment for the services of assistants at surgery in teaching hospitals when qualified residents are available to furnish such services. I certify that the services for which payment is claimed were medically necessary, and that no qualified resident was available to perform the services. I further understand that these services are subject to post-payment review by the Medicare carrier.      ELISHA Lemus    05/13/2022  6:19 AM

## 2022-05-13 NOTE — ANESTHESIA POSTPROCEDURE EVALUATION
Anesthesia Post Evaluation    Patient: Cristina Sprague    Procedure(s) Performed: Procedure(s) (LRB):  XI ROBOTIC LAPAROSCOPY,DIAGNOSTIC (N/A)  SALPINGO-OOPHORECTOMY (Right)  SALPINGECTOMY (Left)  CYSTECTOMY (Left)  XI ROBOTIC LYSIS, ADHESIONS  APPENDECTOMY, LAPAROSCOPIC (N/A)  XI ROBOTIC HYSTERECTOMY    Final Anesthesia Type: general      Patient location during evaluation: PACU  Patient participation: Yes- Able to Participate  Level of consciousness: awake and alert  Post-procedure vital signs: reviewed and stable  Pain management: adequate  Airway patency: patent    PONV status at discharge: No PONV  Anesthetic complications: no      Cardiovascular status: blood pressure returned to baseline  Respiratory status: unassisted  Hydration status: euvolemic  Follow-up not needed.          Vitals Value Taken Time   /59 05/13/22 0335   Temp 36.5 °C (97.7 °F) 05/13/22 0335   Pulse 55 05/13/22 0335   Resp 18 05/13/22 0511   SpO2 100 % 05/13/22 0335         Event Time   Out of Recovery 17:30:00         Pain/Jonnie Score: Pain Rating Prior to Med Admin: 8 (5/13/2022  5:11 AM)  Pain Rating Post Med Admin: -- (asleep) (5/12/2022  6:00 PM)  Jonnie Score: 9 (5/12/2022  7:00 PM)

## 2022-05-14 LAB
CREAT SERPL-MCNC: 0.7 MG/DL (ref 0.5–1.4)
EST. GFR  (AFRICAN AMERICAN): >60 ML/MIN/1.73 M^2
EST. GFR  (NON AFRICAN AMERICAN): >60 ML/MIN/1.73 M^2

## 2022-05-17 ENCOUNTER — TELEPHONE (OUTPATIENT)
Dept: ORTHOPEDICS | Facility: CLINIC | Age: 47
End: 2022-05-17
Payer: MEDICAID

## 2022-05-17 NOTE — TELEPHONE ENCOUNTER
----- Message from Jinny Mccormick sent at 5/17/2022 11:37 AM CDT -----  Type:  Needs Medical Advice    Who Called: pt  Symptoms (please be specific): pt has a appt scheduled for 06/17/22 and the pt would like to reschedule this appt for a week later     Would the patient rather a call back or a response via [facility]? Please call  Best Call Back Number: [phone]  Additional Information:

## 2022-05-18 LAB
FINAL PATHOLOGIC DIAGNOSIS: NORMAL
FROZEN SECTION DIAGNOSIS: NORMAL
FROZEN SECTION FOOTNOTE: NORMAL
GROSS: NORMAL
Lab: NORMAL

## 2022-05-19 LAB
FINAL PATHOLOGIC DIAGNOSIS: NORMAL
Lab: NORMAL
MICROSCOPIC EXAM: NORMAL

## 2022-05-20 NOTE — PHYSICIAN QUERY
PT Name: Cristina Sprague  MR #: [id number]    DOCUMENTATION CLARIFICATION     CDS/: ANIA Montgomery,RNC-MNN         Contact information:[email or web address]  This form is a permanent document in the medical record.     Query Date: May 20, 2022    By submitting this query, we are merely seeking further clarification of documentation.  Please utilize your independent clinical judgment when addressing the question(s) below.    The medical record contains the following:  Pathology Findings Location in Medical Record   1. SPECIMEN LABELED LEFT OVARIAN MASS SHOWS A HEMORRHAGIC CORPUS LUTEUM CYST  (1.8 CM).  2. SPECIMEN LABELED LEFT OVARIAN CYST SHOWS MEMBRANOUS FRAGMENTS OF OVARIAN  PARENCHYMA WITH A HEMORRHAGIC CORPUS LUTEUM CYST  3. FIMBRIATED SEGMENT OF LEFT FALLOPIAN TUBE WITH BENIGN PARATUBAL CYSTS  4. THE RIGHT OVARY SHOWS A BENIGN SIMPLE CYST, A BENIGN FOLLICULAR CYST AND A BENIGN  HEMORRHAGIC CORPUS LUTEUM.   5. UTERUS AND CERVIX WEIGHING 152.8 G SHOWING:  SECRETORY ENDOMETRIUM WITH ADENOMYOSIS  CERVIX WITH NABOTHIAN CYSTS  AN INTRAMURAL LEIOMYOMA PRESENT    Procedure(s) (LRB):  XI ROBOTIC HYSTERECTOMY  URETEROLYSIS (Right)  SALPINGO-OOPHORECTOMY (Right)  SALPINGECTOMY (Left)  CYSTECTOMY (Left)  XI ROBOTIC LYSIS, ADHESIONS  APPENDECTOMY, LAPAROSCOPIC (N/A)    FINDINGS  Normal diaphragms.  Normal liver capsule.  Normal omentum.  Filmy but extremely abundant adhesions between the sigmoid colon, right adnexa, and cul-de-sac.  Dense bladder adhesions.  In total, we spent approximately 45 minutes lysing adhesions with cautery and blunt dissection.  10 cm complex right adnexal mass that was mostly retroperitoneal.  It was very proximal to the ureter and the ureter had to be lysed in usual fashion past the ovary due to retroperitoneal fibrosis and inflammation.  The mass was multi-loculated with a thin capsule.  It ruptured with manipulation and had serous contents.  Enlarged and boggy uterus with minimal  mobility.  Normal left tube.  Normal ovary with simple appearing cysts.  Normal cul-de-sac.  During lysis of adhesions, the appendix was cauterized and this was inherent to the procedure.  The decision was made to proceed with an indicated appendectomy.  The left para-median trocar was up sized to a 12 mm laparoscopic trocar and the appendix was removed in usual fashion with a 45 mm white stapler.  Hemoblast was applied to the vaginal cuff and left ovary.  All frozen pathology was benign.     Pathology report 5/12                                  Op note 5/13       Please clarify:  [  X] Pathology findings noted above are ruled in/confirmed as diagnoses   [  ] Pathology findings noted above are not confirmed as diagnoses   [  ] Pathology findings noted above are incidental   [  ] Other diagnosis (please specify): ___________   [  ] Clinically Undetermined       Please document in your progress notes daily for the duration of treatment until resolved and include in your discharge summary.    Form No. [id number]
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
## 2022-06-07 ENCOUNTER — OFFICE VISIT (OUTPATIENT)
Dept: GYNECOLOGIC ONCOLOGY | Facility: CLINIC | Age: 47
End: 2022-06-07
Payer: MEDICAID

## 2022-06-07 VITALS
BODY MASS INDEX: 34.89 KG/M2 | DIASTOLIC BLOOD PRESSURE: 54 MMHG | HEIGHT: 65 IN | HEART RATE: 61 BPM | WEIGHT: 209.44 LBS | SYSTOLIC BLOOD PRESSURE: 106 MMHG

## 2022-06-07 DIAGNOSIS — E66.9 CLASS 1 OBESITY IN ADULT, UNSPECIFIED BMI, UNSPECIFIED OBESITY TYPE, UNSPECIFIED WHETHER SERIOUS COMORBIDITY PRESENT: ICD-10-CM

## 2022-06-07 DIAGNOSIS — N94.89 ADNEXAL MASS: Primary | ICD-10-CM

## 2022-06-07 DIAGNOSIS — Z15.89 MTHFR MUTATION: ICD-10-CM

## 2022-06-07 DIAGNOSIS — Z86.711 HISTORY OF PULMONARY EMBOLISM: ICD-10-CM

## 2022-06-07 DIAGNOSIS — Z79.01 ANTICOAGULANT LONG-TERM USE: ICD-10-CM

## 2022-06-07 PROCEDURE — 3008F PR BODY MASS INDEX (BMI) DOCUMENTED: ICD-10-PCS | Mod: CPTII,,, | Performed by: OBSTETRICS & GYNECOLOGY

## 2022-06-07 PROCEDURE — 3074F PR MOST RECENT SYSTOLIC BLOOD PRESSURE < 130 MM HG: ICD-10-PCS | Mod: CPTII,,, | Performed by: OBSTETRICS & GYNECOLOGY

## 2022-06-07 PROCEDURE — 3078F PR MOST RECENT DIASTOLIC BLOOD PRESSURE < 80 MM HG: ICD-10-PCS | Mod: CPTII,,, | Performed by: OBSTETRICS & GYNECOLOGY

## 2022-06-07 PROCEDURE — 1159F MED LIST DOCD IN RCRD: CPT | Mod: CPTII,,, | Performed by: OBSTETRICS & GYNECOLOGY

## 2022-06-07 PROCEDURE — 3074F SYST BP LT 130 MM HG: CPT | Mod: CPTII,,, | Performed by: OBSTETRICS & GYNECOLOGY

## 2022-06-07 PROCEDURE — 1159F PR MEDICATION LIST DOCUMENTED IN MEDICAL RECORD: ICD-10-PCS | Mod: CPTII,,, | Performed by: OBSTETRICS & GYNECOLOGY

## 2022-06-07 PROCEDURE — 99024 POSTOP FOLLOW-UP VISIT: CPT | Mod: ,,, | Performed by: OBSTETRICS & GYNECOLOGY

## 2022-06-07 PROCEDURE — 99213 OFFICE O/P EST LOW 20 MIN: CPT | Mod: PBBFAC | Performed by: OBSTETRICS & GYNECOLOGY

## 2022-06-07 PROCEDURE — 99999 PR PBB SHADOW E&M-EST. PATIENT-LVL III: CPT | Mod: PBBFAC,,, | Performed by: OBSTETRICS & GYNECOLOGY

## 2022-06-07 PROCEDURE — 99024 PR POST-OP FOLLOW-UP VISIT: ICD-10-PCS | Mod: ,,, | Performed by: OBSTETRICS & GYNECOLOGY

## 2022-06-07 PROCEDURE — 3008F BODY MASS INDEX DOCD: CPT | Mod: CPTII,,, | Performed by: OBSTETRICS & GYNECOLOGY

## 2022-06-07 PROCEDURE — 99999 PR PBB SHADOW E&M-EST. PATIENT-LVL III: ICD-10-PCS | Mod: PBBFAC,,, | Performed by: OBSTETRICS & GYNECOLOGY

## 2022-06-07 PROCEDURE — 3078F DIAST BP <80 MM HG: CPT | Mod: CPTII,,, | Performed by: OBSTETRICS & GYNECOLOGY

## 2022-06-24 ENCOUNTER — OFFICE VISIT (OUTPATIENT)
Dept: ORTHOPEDICS | Facility: CLINIC | Age: 47
End: 2022-06-24
Payer: MEDICAID

## 2022-06-24 VITALS — BODY MASS INDEX: 34.89 KG/M2 | HEIGHT: 65 IN | WEIGHT: 209.44 LBS

## 2022-06-24 DIAGNOSIS — Z96.651 S/P TOTAL KNEE ARTHROPLASTY, RIGHT: ICD-10-CM

## 2022-06-24 DIAGNOSIS — M17.11 PRIMARY OSTEOARTHRITIS OF RIGHT KNEE: Primary | ICD-10-CM

## 2022-06-24 PROCEDURE — 99999 PR PBB SHADOW E&M-EST. PATIENT-LVL III: ICD-10-PCS | Mod: PBBFAC,,, | Performed by: ORTHOPAEDIC SURGERY

## 2022-06-24 PROCEDURE — 3008F BODY MASS INDEX DOCD: CPT | Mod: CPTII,,, | Performed by: ORTHOPAEDIC SURGERY

## 2022-06-24 PROCEDURE — 99212 PR OFFICE/OUTPT VISIT, EST, LEVL II, 10-19 MIN: ICD-10-PCS | Mod: S$PBB,,, | Performed by: ORTHOPAEDIC SURGERY

## 2022-06-24 PROCEDURE — 99212 OFFICE O/P EST SF 10 MIN: CPT | Mod: S$PBB,,, | Performed by: ORTHOPAEDIC SURGERY

## 2022-06-24 PROCEDURE — 99999 PR PBB SHADOW E&M-EST. PATIENT-LVL III: CPT | Mod: PBBFAC,,, | Performed by: ORTHOPAEDIC SURGERY

## 2022-06-24 PROCEDURE — 1160F PR REVIEW ALL MEDS BY PRESCRIBER/CLIN PHARMACIST DOCUMENTED: ICD-10-PCS | Mod: CPTII,,, | Performed by: ORTHOPAEDIC SURGERY

## 2022-06-24 PROCEDURE — 99213 OFFICE O/P EST LOW 20 MIN: CPT | Mod: PBBFAC,PN | Performed by: ORTHOPAEDIC SURGERY

## 2022-06-24 PROCEDURE — 1159F PR MEDICATION LIST DOCUMENTED IN MEDICAL RECORD: ICD-10-PCS | Mod: CPTII,,, | Performed by: ORTHOPAEDIC SURGERY

## 2022-06-24 PROCEDURE — 1159F MED LIST DOCD IN RCRD: CPT | Mod: CPTII,,, | Performed by: ORTHOPAEDIC SURGERY

## 2022-06-24 PROCEDURE — 1160F RVW MEDS BY RX/DR IN RCRD: CPT | Mod: CPTII,,, | Performed by: ORTHOPAEDIC SURGERY

## 2022-06-24 PROCEDURE — 3008F PR BODY MASS INDEX (BMI) DOCUMENTED: ICD-10-PCS | Mod: CPTII,,, | Performed by: ORTHOPAEDIC SURGERY

## 2022-06-24 RX ORDER — PHENTERMINE HYDROCHLORIDE 37.5 MG/1
37.5 TABLET ORAL DAILY
COMMUNITY
Start: 2022-06-09 | End: 2022-11-18

## 2022-06-24 RX ORDER — NAPROXEN 500 MG/1
500 TABLET ORAL
COMMUNITY
End: 2022-11-18

## 2022-06-24 NOTE — PROGRESS NOTES
"Subjective:      Patient ID: Cristina Sprague is a 46 y.o. female.    Chief Complaint: Follow-up (7 month p/o- right TKA )      HPI:  About 0.5 years postop  The patient is seen for postop follow-up of right  TKA.  Pain control has been satisfactory  They feel that they are ambulating easily  Postoperative complaints include:  None at this time      Current Outpatient Medications:     acetaminophen (TYLENOL ARTHRITIS ORAL), Take by mouth., Disp: , Rfl:     acetaminophen (TYLENOL) 325 MG tablet, Take 2 tablets (650 mg total) by mouth every 6 (six) hours as needed for Pain., Disp: 30 tablet, Rfl: 2    ELIQUIS 5 mg Tab, Take 5 mg by mouth 2 (two) times daily., Disp: , Rfl: 11    esomeprazole magnesium (NEXIUM ORAL), Nexium Take No date recorded No form recorded No frequency recorded No route recorded No set duration recorded No set duration amount recorded active No dosage strength recorded No dosage strength units of measure recorded, Disp: , Rfl:     multivitamin capsule, Take 1 capsule by mouth once daily., Disp: , Rfl:     naproxen (NAPROSYN) 500 MG tablet, Take 500 mg by mouth., Disp: , Rfl:     phentermine (ADIPEX-P) 37.5 mg tablet, Take 37.5 mg by mouth once daily., Disp: , Rfl:     fluticasone propionate (FLONASE) 50 mcg/actuation nasal spray, SPRAY 1 SPRAY IN EACH NOSTRIL ONCE DAILY (Patient not taking: Reported on 6/24/2022), Disp: 16 mL, Rfl: 3    oxyCODONE (ROXICODONE) 5 MG immediate release tablet, Take 1 tablet (5 mg total) by mouth every 4 (four) hours as needed for Pain. (Patient not taking: Reported on 6/24/2022), Disp: 10 tablet, Rfl: 0  Review of patient's allergies indicates:  No Known Allergies    Ht 5' 5" (1.651 m)   Wt 95 kg (209 lb 7 oz)   LMP 02/11/2022   BMI 34.85 kg/m²     Review of Systems   Constitutional: Negative for fever and weight loss.   HENT: Negative for congestion.    Eyes: Negative for visual disturbance.   Cardiovascular: Negative for chest pain.   Respiratory: Negative " for shortness of breath.    Hematologic/Lymphatic: Negative for bleeding problem. Does not bruise/bleed easily.   Skin: Negative for poor wound healing.   Gastrointestinal: Negative for abdominal pain.   Genitourinary: Negative for dysuria.   Neurological: Negative for seizures.   Psychiatric/Behavioral: Negative for altered mental status.   Allergic/Immunologic: Negative for persistent infections.           Objective:    Ortho Exam          Alert, oriented, no acute distress  Sclera-Normal  Respiratory distress-none  Gait no limp  Incision:  Normally healed  Range of motion:  0-140  Valgus/varus stability- stable  Swelling-none  Distal perfusion-intact  Distal neurologic-intact    Imaging:  None today    Assessment:             1. Primary osteoarthritis of right knee    2. S/P total knee arthroplasty, right        The patient has rehabilitated well after her surgery.  There is no evidence of complication at the surgical site.        Plan:          No follow-ups on file.  Explained my assessment and reviewed the natural history of recovery from the procedure    Appropriate levels of activity were discussed    X-ray 1 year postop

## 2022-08-24 ENCOUNTER — HOSPITAL ENCOUNTER (EMERGENCY)
Facility: HOSPITAL | Age: 47
Discharge: HOME | End: 2022-08-24
Attending: EMERGENCY MEDICINE
Payer: MEDICAID

## 2022-08-24 VITALS
TEMPERATURE: 98 F | OXYGEN SATURATION: 98 % | RESPIRATION RATE: 18 BRPM | BODY MASS INDEX: 34.99 KG/M2 | HEIGHT: 65 IN | DIASTOLIC BLOOD PRESSURE: 78 MMHG | SYSTOLIC BLOOD PRESSURE: 131 MMHG | HEART RATE: 48 BPM | WEIGHT: 210 LBS
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
## 2022-08-24 DIAGNOSIS — S16.1XXA STRAIN OF NECK MUSCLE, INITIAL ENCOUNTER: ICD-10-CM

## 2022-08-24 DIAGNOSIS — S80.11XA CONTUSION OF RIGHT LEG, INITIAL ENCOUNTER: Primary | ICD-10-CM

## 2022-08-24 PROCEDURE — 99284 EMERGENCY DEPT VISIT MOD MDM: CPT | Mod: 25

## 2022-08-24 PROCEDURE — 99284 PR EMERGENCY DEPT VISIT,LEVEL IV: ICD-10-PCS | Mod: ,,, | Performed by: EMERGENCY MEDICINE

## 2022-08-24 PROCEDURE — 25000003 PHARM REV CODE 250: Performed by: EMERGENCY MEDICINE

## 2022-08-24 PROCEDURE — 99284 EMERGENCY DEPT VISIT MOD MDM: CPT | Mod: ,,, | Performed by: EMERGENCY MEDICINE

## 2022-08-24 RX ORDER — ACETAMINOPHEN 500 MG
1000 TABLET ORAL
Status: COMPLETED | OUTPATIENT
Start: 2022-08-24 | End: 2022-08-24

## 2022-08-24 RX ADMIN — ACETAMINOPHEN 1000 MG: 500 TABLET ORAL at 12:08
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
## 2022-08-28 ENCOUNTER — PATIENT MESSAGE (OUTPATIENT)
Dept: ORTHOPEDICS | Facility: CLINIC | Age: 47
End: 2022-08-28
Payer: MEDICAID

## 2022-09-06 DIAGNOSIS — M25.512 LEFT SHOULDER PAIN, UNSPECIFIED CHRONICITY: Primary | ICD-10-CM

## 2022-09-07 ENCOUNTER — HOSPITAL ENCOUNTER (OUTPATIENT)
Dept: RADIOLOGY | Facility: HOSPITAL | Age: 47
Discharge: HOME | End: 2022-09-07
Attending: ORTHOPAEDIC SURGERY
Payer: MEDICAID

## 2022-09-07 ENCOUNTER — OFFICE VISIT (OUTPATIENT)
Dept: ORTHOPEDICS | Facility: CLINIC | Age: 47
End: 2022-09-07
Payer: MEDICAID

## 2022-09-07 VITALS
HEART RATE: 48 BPM | WEIGHT: 210.13 LBS | DIASTOLIC BLOOD PRESSURE: 78 MMHG | HEIGHT: 65 IN | SYSTOLIC BLOOD PRESSURE: 131 MMHG | BODY MASS INDEX: 35.01 KG/M2

## 2022-09-07 DIAGNOSIS — M25.512 LEFT SHOULDER PAIN, UNSPECIFIED CHRONICITY: ICD-10-CM

## 2022-09-07 DIAGNOSIS — M19.012 ARTHRITIS OF LEFT ACROMIOCLAVICULAR JOINT: Primary | ICD-10-CM

## 2022-09-07 DIAGNOSIS — M75.42 ROTATOR CUFF IMPINGEMENT SYNDROME OF LEFT SHOULDER: ICD-10-CM

## 2022-09-07 PROCEDURE — 73030 X-RAY EXAM OF SHOULDER: CPT | Mod: TC,PN,LT

## 2022-09-07 PROCEDURE — 99214 OFFICE O/P EST MOD 30 MIN: CPT | Mod: S$PBB,,, | Performed by: ORTHOPAEDIC SURGERY

## 2022-09-07 PROCEDURE — 73030 XR SHOULDER COMPLETE 2 OR MORE VIEWS LEFT: ICD-10-PCS | Mod: 26,LT,, | Performed by: RADIOLOGY

## 2022-09-07 PROCEDURE — 99999 PR PBB SHADOW E&M-EST. PATIENT-LVL III: CPT | Mod: PBBFAC,,, | Performed by: ORTHOPAEDIC SURGERY

## 2022-09-07 PROCEDURE — 99999 PR PBB SHADOW E&M-EST. PATIENT-LVL III: ICD-10-PCS | Mod: PBBFAC,,, | Performed by: ORTHOPAEDIC SURGERY

## 2022-09-07 PROCEDURE — 99214 PR OFFICE/OUTPT VISIT, EST, LEVL IV, 30-39 MIN: ICD-10-PCS | Mod: S$PBB,,, | Performed by: ORTHOPAEDIC SURGERY

## 2022-09-07 PROCEDURE — 73030 X-RAY EXAM OF SHOULDER: CPT | Mod: 26,LT,, | Performed by: RADIOLOGY

## 2022-09-07 PROCEDURE — 99213 OFFICE O/P EST LOW 20 MIN: CPT | Mod: PBBFAC,PN | Performed by: ORTHOPAEDIC SURGERY
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
## 2022-09-12 ENCOUNTER — CLINICAL SUPPORT (OUTPATIENT)
Dept: REHABILITATION | Facility: HOSPITAL | Age: 47
End: 2022-09-12
Payer: MEDICAID

## 2022-09-12 DIAGNOSIS — M75.42 ROTATOR CUFF IMPINGEMENT SYNDROME OF LEFT SHOULDER: ICD-10-CM

## 2022-09-12 DIAGNOSIS — M25.60 STIFFNESS DUE TO IMMOBILITY: ICD-10-CM

## 2022-09-12 DIAGNOSIS — M79.602 PAIN OF LEFT UPPER EXTREMITY: ICD-10-CM

## 2022-09-12 DIAGNOSIS — R53.1 WEAKNESS: ICD-10-CM

## 2022-09-12 DIAGNOSIS — M19.012 ARTHRITIS OF LEFT ACROMIOCLAVICULAR JOINT: ICD-10-CM

## 2022-09-12 DIAGNOSIS — Z74.09 STIFFNESS DUE TO IMMOBILITY: ICD-10-CM

## 2022-09-12 PROCEDURE — 97165 OT EVAL LOW COMPLEX 30 MIN: CPT | Mod: PN
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
## 2022-09-23 ENCOUNTER — CLINICAL SUPPORT (OUTPATIENT)
Dept: REHABILITATION | Facility: HOSPITAL | Age: 47
End: 2022-09-23
Payer: MEDICAID

## 2022-09-23 DIAGNOSIS — M25.60 STIFFNESS DUE TO IMMOBILITY: ICD-10-CM

## 2022-09-23 DIAGNOSIS — Z74.09 STIFFNESS DUE TO IMMOBILITY: ICD-10-CM

## 2022-09-23 DIAGNOSIS — R53.1 WEAKNESS: ICD-10-CM

## 2022-09-23 DIAGNOSIS — M79.602 PAIN OF LEFT UPPER EXTREMITY: Primary | ICD-10-CM

## 2022-09-23 PROCEDURE — 97110 THERAPEUTIC EXERCISES: CPT | Mod: PN

## 2022-09-23 PROCEDURE — 97530 THERAPEUTIC ACTIVITIES: CPT | Mod: PN

## 2022-09-23 NOTE — PROGRESS NOTES
"Occupational Therapy Daily Treatment Note     Name: Cristina Laceyphill  Clinic Number: [id number]    Therapy Diagnosis:   Encounter Diagnoses   Name Primary?    Pain of left upper extremity Yes    Weakness     Stiffness due to immobility      Physician: Tacos Nielson*    Visit Date: 9/23/2022    Physician Orders: OT evaluate and treat  Medical Diagnosis:   M19.012 (ICD-10-CM) - Arthritis of left acromioclavicular joint   M75.42 (ICD-10-CM) - Rotator cuff impingement syndrome of left shoulder      Evaluation Date: 9/12/2022  Insurance Authorization period Expiration: 9/7/2024  Plan of Care Expiration Period: 11/11/2022  Next MD appointment:10/26/22     Visit # / Visits Authorized: 1 / 20  Time In: 10:55  Time Out: 11:40  Total Billable Time: 45 minutes     Subjective     Pt reports: "I have a lot of pain when I lay on that left side. Its like a 9/10. When I move it, the pain is a pulling/burning sensation. No pain when I'm just sitting here."  she was compliant with home exercise program given last session.   Response to previous treatment:good  Functional change: compliance with home exercise program, decreased pain with movement     Pain: 3/10  Location: left shoulder  with movement - pulling/burning sensation     Objective     Range of Motion/Strength:   Shoulder   Left     Right   Pain/Dysfunction with Movement     AROM PROM MMT AROM PROM MMT     Flexion 145 WNL 4/5 WNL WNL WNL     Extension 60 WNL 4/5 WNL WNL WNL     Abduction 135 WNL 4/5 WNL WNL WNL     HorizAdduction 20 WNL 4/5 WNL WNL WNL     Internal rotation L4 WNL 4/5 WNL WNL WNL     ER at 90° abd 80 WNL 4/5 WNL WNL WNL     ER at 0° abd 80 WNL 4/5 WNL WNL WNL     NT=Not tested  ROM Comments: Pain at end range    Cristina received the following manual therapy techniques for 10 minutes:   -passive range of motion, transverse friction massage, gentle joint mobilizations with pectoralis stretch left upper extremity     Cristina received therapeutic exercises for 35 " "minutes including:  Exercises     Passive range of motion  X10 minutes    Supine chest press/flexion  2/12  1#   Sidelying shoulder abduction/adduction  2/12  0   Sidelying external rotation 2/12  1   Corner stretch  3/30"   Wall slides  x10   Theraband Shoulder extension  2/15  red   Theraband row 2/15  Red        Home Exercises and Education Provided     Education provided:   - continue with home exercise program issued, importance of postural stability and preventing impingement  - Progress towards goals     Written Home Exercises Provided: Patient instructed to cont prior HEP.  Exercises were reviewed and Cristina was able to demonstrate them prior to the end of the session.  Cristina demonstrated good  understanding of the HEP provided.   .   See EMR under Patient Instructions for exercises provided prior visit.        Assessment     Cristina tolerated today's session well. Noted soft tissue restrictions in left pec muscle with manual therapy. Cues provided for postural and joint stability due to noted instability with shoulder flexion supine and abduction when sidelying. Improvement with range of motion after she implemented skilled instruction. She was able to complete all movements with increased weight and no pain in left shoulder. Overall good response and Cristina is motivated.     Cristina is progressing well towards her goals and there are no updates to goals at this time. Pt prognosis is Excellent.     Pt will continue to benefit from skilled outpatient occupational therapy to address the deficits listed in the problem list on initial evaluation provide pt/family education and to maximize pt's level of independence in the home and community environment.     Anticipated barriers to occupational therapy: none    Pt's spiritual, cultural and educational needs considered and pt agreeable to plan of care and goals.    Goals:      Short Term Goals to be met in 4 weeks: (10/12/2022)  1) Initiate Hep Not met, progressing  2) Pt will " increase Left shoulder AROM by 10 degrees grossly for improved performance with overhead ADL's Not met, progressing  3) Pt will report 6/10 pain in (Left)shoulder at worst Not met, progressing  4) Pt will demonstrate increased MMT to 4+/5 grossly Left shoulder Not met, progressing  5) Patient will be able to achieve less than or equal to 30% on FOTO shoulder survey demonstrating overall improved functional ability with upper extremity. Not met, progressing     Long Term Goals to be met by discharge:  1) Independent with home exercise program Not met, progressing  2) Pt will demonstrate (Left) shoulder AROM WNL grossly for [place] with ADL's Not met, progressing  3) Pt will demonstrate (Left) shoulder MMT WNL grossly for [place] with functional activities Not met, progressing  4) Independent and pain free with ADL's and IADL's Not met, progressing  5) Patient will be able to achieve less than or equal to 25% on FOTO shoulder survey demonstrating overall improved functional ability with upper extremity.  Not met, progressing    Plan   Certification Period/Plan of care expiration: 9/12/2022 to 11/11/2022.     Outpatient Occupational Therapy 1 times weekly for 8 weeks to include the following interventions: Manual therapy/joint mobilizations, Modalities for pain management, Therapeutic exercises/activities., Strengthening, Joint Protection, and Energy Conservation.     Updates/Grading for next session: continue with POC Corinne Rapier, OT

## 2022-09-27 NOTE — PROGRESS NOTES
"Occupational Therapy Daily Treatment Note     Name: Cristina MCKEON [place]  Clinic Number: [id number]    Therapy Diagnosis:   Encounter Diagnoses   Name Primary?    Pain of left upper extremity Yes    Weakness     Stiffness due to immobility        Physician: Tacos Nielson*    Visit Date: 9/28/2022    Physician Orders: OT evaluate and treat  Medical Diagnosis:   M19.012 (ICD-10-CM) - Arthritis of left acromioclavicular joint   M75.42 (ICD-10-CM) - Rotator cuff impingement syndrome of left shoulder      Evaluation Date: 9/12/2022  Insurance Authorization period Expiration: 9/7/2024  Plan of Care Expiration Period: 11/11/2022  Next MD appointment:10/26/22     Visit # / Visits Authorized: 3 / 20  Time In: 11:30  Time Out: 12:15  Total Billable Time: 45 minutes     Subjective     Pt reports: "I've been doing my exercises."  she was compliant with home exercise program given last session.   Response to previous treatment:good  Functional change: compliance with home exercise program, decreased pain with movement     Pain: 3-4/10  Location: left shoulder  with certain movements    Objective     Range of Motion/Strength:   Shoulder   Left       AROM PROM MMT   Flexion 145 WNL 4/5   Extension 60 WNL 4/5   Abduction 135 WNL 4/5   HorizAdduction 20 WNL 4/5   Internal rotation L4 WNL 4/5   ER at 90° abd 80 WNL 4/5   ER at 0° abd 80 WNL 4/5   NT=Not tested  ROM Comments: Pain at end range    Cristina received the following manual therapy techniques for 10 minutes:   - consisting of patient supine for Left shoulder lateral telescoping, upper trapezius soft tissue mobilization, pectoralis lift, subscapularis myofascial release and stretch, PROM with endrange stretching, glenohumeral joint inferior anterior posterior glides grade I-III, gentle shoulder oscillations    Cristina received therapeutic exercises for 35 minutes including:  Exercises    Scifit UBE forward/reverse 3minutes each direction  Level 1   PROM (Left) Shoulder " "Flexion/Abduction/Internal rotation/External Rotation 10x     Supine dowel Flexion 2  2/15   Sidelying Abduction  1  2/15   Sidelying External Rotation 1  2/15   Theraband Extension pull downs Green  2/15   Theraband Rows Green  2/15   Theraband Bilateral horizontal abduction Green  2/15   Theraband Bilateral External Rotation Green  2/15   Corner pectoralis stretch 3/30"     Home Exercises and Education Provided     Education provided:   - continue with home exercise program issued, importance of postural stability and preventing impingement  - Progress towards goals     Written Home Exercises Provided: Patient instructed to cont prior HEP.  Exercises were reviewed and Cristina was able to demonstrate them prior to the end of the session.  Cristina demonstrated good  understanding of the HEP provided.   .   See EMR under Patient Instructions for exercises provided prior visit.        Assessment     Pt with good participation this date. Pain report low throughout session. Soft tissue restrictions noted in biceps and pectoralis however responded well to treatment. Pt with good endurance with exercises. She was able to progress with weight and resistance without c/o and good technique noted. Pt motivated.     Cristina is progressing well towards her goals and there are no updates to goals at this time. Pt prognosis is Excellent.     Pt will continue to benefit from skilled outpatient occupational therapy to address the deficits listed in the problem list on initial evaluation provide pt/family education and to maximize pt's level of independence in the home and community environment.     Anticipated barriers to occupational therapy: none    Pt's spiritual, cultural and educational needs considered and pt agreeable to plan of care and goals.    Goals:      Short Term Goals to be met in 4 weeks: (10/12/2022)  1) Initiate Hep Not met, progressing  2) Pt will increase Left shoulder AROM by 10 degrees grossly for improved performance " with overhead ADL's Not met, progressing  3) Pt will report 6/10 pain in (Left)shoulder at worst Not met, progressing  4) Pt will demonstrate increased MMT to 4+/5 grossly Left shoulder Not met, progressing  5) Patient will be able to achieve less than or equal to 30% on FOTO shoulder survey demonstrating overall improved functional ability with upper extremity. Not met, progressing     Long Term Goals to be met by discharge:  1) Independent with home exercise program Not met, progressing  2) Pt will demonstrate (Left) shoulder AROM WNL grossly for [place] with ADL's Not met, progressing  3) Pt will demonstrate (Left) shoulder MMT WNL grossly for [place] with functional activities Not met, progressing  4) Independent and pain free with ADL's and IADL's Not met, progressing  5) Patient will be able to achieve less than or equal to 25% on FOTO shoulder survey demonstrating overall improved functional ability with upper extremity.  Not met, progressing    Plan   Certification Period/Plan of care expiration: 9/12/2022 to 11/11/2022.     Outpatient Occupational Therapy 1 times weekly for 8 weeks to include the following interventions: Manual therapy/joint mobilizations, Modalities for pain management, Therapeutic exercises/activities., Strengthening, Joint Protection, and Energy Conservation.     Updates/Grading for next session: continue with POC       SUE Adams

## 2022-09-28 ENCOUNTER — PATIENT MESSAGE (OUTPATIENT)
Dept: SPORTS MEDICINE | Facility: CLINIC | Age: 47
End: 2022-09-28
Payer: MEDICAID

## 2022-09-28 ENCOUNTER — TELEPHONE (OUTPATIENT)
Dept: SPORTS MEDICINE | Facility: CLINIC | Age: 47
End: 2022-09-28
Payer: MEDICAID

## 2022-09-28 ENCOUNTER — CLINICAL SUPPORT (OUTPATIENT)
Dept: REHABILITATION | Facility: HOSPITAL | Age: 47
End: 2022-09-28
Payer: MEDICAID

## 2022-09-28 DIAGNOSIS — M79.602 PAIN OF LEFT UPPER EXTREMITY: Primary | ICD-10-CM

## 2022-09-28 DIAGNOSIS — R53.1 WEAKNESS: ICD-10-CM

## 2022-09-28 DIAGNOSIS — Z74.09 STIFFNESS DUE TO IMMOBILITY: ICD-10-CM

## 2022-09-28 DIAGNOSIS — M25.60 STIFFNESS DUE TO IMMOBILITY: ICD-10-CM

## 2022-09-28 PROCEDURE — 97530 THERAPEUTIC ACTIVITIES: CPT | Mod: PN

## 2022-09-28 NOTE — TELEPHONE ENCOUNTER
Tried to call patient to reschedule her appointment on 10/26. No answer, left message. Reached out via patient portal as well.

## 2022-10-04 NOTE — PROGRESS NOTES
"Occupational Therapy Daily Treatment Note     Name: Cristina Gironll  Clinic Number: [id number]    Therapy Diagnosis:   Encounter Diagnoses   Name Primary?    Pain of left upper extremity Yes    Weakness     Stiffness due to immobility        Physician: Tacos Nielson*    Visit Date: 10/5/2022    Physician Orders: OT evaluate and treat  Medical Diagnosis:   M19.012 (ICD-10-CM) - Arthritis of left acromioclavicular joint   M75.42 (ICD-10-CM) - Rotator cuff impingement syndrome of left shoulder      Evaluation Date: 9/12/2022  Insurance Authorization period Expiration: 9/7/2024  Plan of Care Expiration Period: 11/11/2022  Next MD appointment:10/26/22     Visit # / Visits Authorized: 3 (+1/ 20  Time In: 11:30  Time Out: 12:15  Total Billable Time: 45 minutes     Subjective     Pt reports: "I am really sore today, I'm not sure if I slept on this shoulder wrong or what caused it."  she was compliant with home exercise program given last session.   Response to previous treatment:good  Functional change: compliance with home exercise program, decreased pain with movement     Pain: 5-6/10  Location: left shoulder  with certain movements    Objective     Range of Motion/Strength:   Shoulder   Left       AROM PROM MMT   Flexion 155(+10) WNL 4/5   Extension 60(=) WNL 4/5   Abduction 145(+10) WNL 4/5   HorizAdduction 25(+5) WNL 4/5   Internal rotation L4(=) WNL 4/5   ER at 90° abd 80(=) WNL 4/5   ER at 0° abd 90(+10) WNL 4/5   NT=Not tested  ROM Comments: Pain at end range    Cristina received the following manual therapy techniques for 10 minutes:   - consisting of patient supine for Left shoulder lateral telescoping, upper trapezius soft tissue mobilization, pectoralis lift, subscapularis myofascial release and stretch, PROM with endrange stretching, glenohumeral joint inferior anterior posterior glides grade I-III, gentle shoulder oscillations    Cristina received therapeutic exercises for 35 minutes including:  Exercises  " "  Scifit UBE forward/reverse 3minutes each direction  Level 1   PROM (Left) Shoulder Flexion/Abduction/Internal rotation/External Rotation 10x     Supine dowel Flexion 2  2/15   Sidelying Abduction  1  2/15   Sidelying External Rotation 1  2/15   Theraband Extension pull downs Green  2/15   Theraband Rows Green  2/15   Theraband Bilateral horizontal abduction Green  2/15   Theraband Bilateral External Rotation Green  2/15   Corner pectoralis stretch 3/30"     Home Exercises and Education Provided     Education provided:   - continue with home exercise program issued, importance of postural stability and preventing impingement  - Progress towards goals     Written Home Exercises Provided: Patient instructed to cont prior HEP.  Exercises were reviewed and Cristina was able to demonstrate them prior to the end of the session.  Cristina demonstrated good  understanding of the HEP provided.   .   See EMR under Patient Instructions for exercises provided prior visit.        Assessment     Pt with increased pain today however did not limit him in today's session. Pt with good endurance with exercises completing all in a pain free range of motion and good technique.  Soft tissue restrictions noted in biceps and pectoralis however responded well to treatment. Pt's range of motion steadily improving. Pt motivated.     Cristina is progressing well towards her goals and there are no updates to goals at this time. Pt prognosis is Excellent.     Pt will continue to benefit from skilled outpatient occupational therapy to address the deficits listed in the problem list on initial evaluation provide pt/family education and to maximize pt's level of independence in the home and community environment.     Anticipated barriers to occupational therapy: none    Pt's spiritual, cultural and educational needs considered and pt agreeable to plan of care and goals.    Goals:      Short Term Goals to be met in 4 weeks: (10/12/2022)  1) Initiate Hep met, " ongoing  2) Pt will increase Left shoulder AROM by 10 degrees grossly for improved performance with overhead ADL's met  3) Pt will report 6/10 pain in (Left)shoulder at worst met  4) Pt will demonstrate increased MMT to 4+/5 grossly Left shoulder Not met, progressing  5) Patient will be able to achieve less than or equal to 30% on FOTO shoulder survey demonstrating overall improved functional ability with upper extremity. Not met, progressing     Long Term Goals to be met by discharge:  1) Independent with home exercise program Not met, progressing  2) Pt will demonstrate (Left) shoulder AROM WNL grossly for [place] with ADL's Not met, progressing  3) Pt will demonstrate (Left) shoulder MMT WNL grossly for [place] with functional activities Not met, progressing  4) Independent and pain free with ADL's and IADL's Not met, progressing  5) Patient will be able to achieve less than or equal to 25% on FOTO shoulder survey demonstrating overall improved functional ability with upper extremity.  Not met, progressing    Plan   Certification Period/Plan of care expiration: 9/12/2022 to 11/11/2022.     Outpatient Occupational Therapy 1 times weekly for 8 weeks to include the following interventions: Manual therapy/joint mobilizations, Modalities for pain management, Therapeutic exercises/activities., Strengthening, Joint Protection, and Energy Conservation.     Updates/Grading for next session: continue with POC       SUE Adams

## 2022-10-05 ENCOUNTER — CLINICAL SUPPORT (OUTPATIENT)
Dept: REHABILITATION | Facility: HOSPITAL | Age: 47
End: 2022-10-05
Payer: MEDICAID

## 2022-10-05 DIAGNOSIS — R53.1 WEAKNESS: ICD-10-CM

## 2022-10-05 DIAGNOSIS — M25.60 STIFFNESS DUE TO IMMOBILITY: ICD-10-CM

## 2022-10-05 DIAGNOSIS — Z74.09 STIFFNESS DUE TO IMMOBILITY: ICD-10-CM

## 2022-10-05 DIAGNOSIS — M79.602 PAIN OF LEFT UPPER EXTREMITY: Primary | ICD-10-CM

## 2022-10-05 PROCEDURE — 97530 THERAPEUTIC ACTIVITIES: CPT | Mod: PN

## 2022-10-13 ENCOUNTER — TELEPHONE (OUTPATIENT)
Dept: SPORTS MEDICINE | Facility: CLINIC | Age: 47
End: 2022-10-13
Payer: MEDICAID

## 2022-10-13 NOTE — TELEPHONE ENCOUNTER
Spoke with patient in regards to rescheduling appointment due to provider being in surgeries on Wednesdays. Patient confirmed new appointment date and time, but if anything changes to where she will not make appointment she will give me a call once she looks at her schedule.

## 2022-10-13 NOTE — TELEPHONE ENCOUNTER
----- Message from Chantale Valadez sent at 10/13/2022  2:05 PM CDT -----  Contact: pt  Pt is returning call to office       Confirmed patient's contact info below:  Contact Name: Cristina Sprague  Phone Number: [phone]

## 2022-10-18 NOTE — PROGRESS NOTES
"Occupational Therapy Daily Treatment Note     Name: Cristina Laceyphill  Clinic Number: [id number]    Therapy Diagnosis:   Encounter Diagnoses   Name Primary?    Pain of left upper extremity Yes    Weakness     Stiffness due to immobility      Physician: Tacos Nielson*    Visit Date: 10/19/2022    Physician Orders: OT evaluate and treat  Medical Diagnosis:   M19.012 (ICD-10-CM) - Arthritis of left acromioclavicular joint   M75.42 (ICD-10-CM) - Rotator cuff impingement syndrome of left shoulder      Evaluation Date: 9/12/2022  Insurance Authorization period Expiration: 9/7/2024  Plan of Care Expiration Period: 11/11/2022  Next MD appointment:10/28/22     Visit # / Visits Authorized: 4 (+1)/ 20 FOTO:42%  Time In: 11:30  Time Out: 12:15  Total Billable Time: 45 minutes     Subjective     Pt reports: "I am really sore today, but I was helping my brother move all weekend."  she was compliant with home exercise program given last session.   Response to previous treatment: decreased pain  Functional change: compliance with home exercise program, decreased pain with movement     Pain: 1-2/10  Location: left shoulder  with certain movements    Objective     Range of Motion/Strength:   Shoulder   Left       AROM PROM MMT   Flexion 155(=) WNL 4/5   Extension 60(=) WNL 4/5   Abduction 150(+5) WNL 4/5   HorizAdduction 25(=) WNL 4/5   Internal rotation L4(=) WNL 4/5   ER at 90° abd 90(+10) WNL 4/5   ER at 0° abd 90(=) WNL 4/5   NT=Not tested  ROM Comments: Pain at end range    Cristina received the following manual therapy techniques for 10 minutes:   - consisting of patient supine for Left shoulder lateral telescoping, upper trapezius soft tissue mobilization, pectoralis lift, subscapularis myofascial release and stretch, PROM with endrange stretching, glenohumeral joint inferior anterior posterior glides grade I-III, gentle shoulder oscillations    Cristina received therapeutic exercises for 35 minutes including:  Exercises    Scifit " "UBE forward/reverse 3minutes each direction  Level 1   PROM (Left) Shoulder Flexion/Abduction/Internal rotation/External Rotation 10x     Supine dowel Flexion 3  2/15   Sidelying Abduction  2  2/15   Sidelying External Rotation 2  2/15   Theraband Extension pull downs Green  2/15   Theraband Rows Green  2/15   Theraband Bilateral horizontal abduction Green  2/15   Theraband Bilateral External Rotation Green  2/15   Corner pectoralis stretch 3/30"     Home Exercises and Education Provided     Education provided:   - continue with home exercise program issued, importance of postural stability and preventing impingement  - Progress towards goals     Written Home Exercises Provided: Patient instructed to cont prior HEP.  Exercises were reviewed and Cristina was able to demonstrate them prior to the end of the session.  Cristina demonstrated good  understanding of the HEP provided.     See EMR under Patient Instructions for exercises provided prior visit.     Assessment     Pt with decreased pain today as compared to last session. She was able to increase weight with exercises demonstrating all with good technique and without c/o.  Soft tissue restrictions noted in biceps and pectoralis however responded well to treatment. Pt's range of motion continues to improve and is approaching within normal limits grossly. Pt with an improved FOTO score indicating increased functional use left shoulder with self care tasks. Pt motivated.     Cristina is progressing well towards her goals and there are no updates to goals at this time. Pt prognosis is Excellent.     Pt will continue to benefit from skilled outpatient occupational therapy to address the deficits listed in the problem list on initial evaluation provide pt/family education and to maximize pt's level of independence in the home and community environment.     Anticipated barriers to occupational therapy: none    Pt's spiritual, cultural and educational needs considered and pt agreeable " to plan of care and goals.    Goals:   Short Term Goals to be met in 4 weeks: (10/12/2022)  1) Initiate Hep met, ongoing  2) Pt will increase Left shoulder AROM by 10 degrees grossly for improved performance with overhead ADL's met  3) Pt will report 6/10 pain in (Left)shoulder at worst met  4) Pt will demonstrate increased MMT to 4+/5 grossly Left shoulder Not met, progressing  5) Patient will be able to achieve less than or equal to 30% on FOTO shoulder survey demonstrating overall improved functional ability with upper extremity. Not met, progressing     Long Term Goals to be met by discharge:  1) Independent with home exercise program Not met, progressing  2) Pt will demonstrate (Left) shoulder AROM WNL grossly for [place] with ADL's Not met, progressing  3) Pt will demonstrate (Left) shoulder MMT WNL grossly for [place] with functional activities Not met, progressing  4) Independent and pain free with ADL's and IADL's Not met, progressing  5) Patient will be able to achieve less than or equal to 25% on FOTO shoulder survey demonstrating overall improved functional ability with upper extremity.  Not met, progressing    Plan   Certification Period/Plan of care expiration: 9/12/2022 to 11/11/2022.     Outpatient Occupational Therapy 1 times weekly for 8 weeks to include the following interventions: Manual therapy/joint mobilizations, Modalities for pain management, Therapeutic exercises/activities., Strengthening, Joint Protection, and Energy Conservation.     Updates/Grading for next session: continue with POC       SUE Adams

## 2022-10-19 ENCOUNTER — PATIENT MESSAGE (OUTPATIENT)
Dept: SPORTS MEDICINE | Facility: CLINIC | Age: 47
End: 2022-10-19
Payer: MEDICAID

## 2022-10-19 ENCOUNTER — CLINICAL SUPPORT (OUTPATIENT)
Dept: REHABILITATION | Facility: HOSPITAL | Age: 47
End: 2022-10-19
Payer: MEDICAID

## 2022-10-19 DIAGNOSIS — M79.602 PAIN OF LEFT UPPER EXTREMITY: Primary | ICD-10-CM

## 2022-10-19 DIAGNOSIS — Z74.09 STIFFNESS DUE TO IMMOBILITY: ICD-10-CM

## 2022-10-19 DIAGNOSIS — R53.1 WEAKNESS: ICD-10-CM

## 2022-10-19 DIAGNOSIS — M25.60 STIFFNESS DUE TO IMMOBILITY: ICD-10-CM

## 2022-10-19 PROCEDURE — 97530 THERAPEUTIC ACTIVITIES: CPT | Mod: PN

## 2022-10-28 ENCOUNTER — OFFICE VISIT (OUTPATIENT)
Dept: ORTHOPEDICS | Facility: CLINIC | Age: 47
End: 2022-10-28
Payer: MEDICAID

## 2022-10-28 DIAGNOSIS — M75.42 ROTATOR CUFF IMPINGEMENT SYNDROME OF LEFT SHOULDER: Primary | ICD-10-CM

## 2022-10-28 PROCEDURE — 1160F RVW MEDS BY RX/DR IN RCRD: CPT | Mod: CPTII,,, | Performed by: ORTHOPAEDIC SURGERY

## 2022-10-28 PROCEDURE — 99213 OFFICE O/P EST LOW 20 MIN: CPT | Mod: PBBFAC,PN | Performed by: ORTHOPAEDIC SURGERY

## 2022-10-28 PROCEDURE — 1159F PR MEDICATION LIST DOCUMENTED IN MEDICAL RECORD: ICD-10-PCS | Mod: CPTII,,, | Performed by: ORTHOPAEDIC SURGERY

## 2022-10-28 PROCEDURE — 99999 PR PBB SHADOW E&M-EST. PATIENT-LVL III: ICD-10-PCS | Mod: PBBFAC,,, | Performed by: ORTHOPAEDIC SURGERY

## 2022-10-28 PROCEDURE — 99212 PR OFFICE/OUTPT VISIT, EST, LEVL II, 10-19 MIN: ICD-10-PCS | Mod: S$PBB,,, | Performed by: ORTHOPAEDIC SURGERY

## 2022-10-28 PROCEDURE — 1160F PR REVIEW ALL MEDS BY PRESCRIBER/CLIN PHARMACIST DOCUMENTED: ICD-10-PCS | Mod: CPTII,,, | Performed by: ORTHOPAEDIC SURGERY

## 2022-10-28 PROCEDURE — 99999 PR PBB SHADOW E&M-EST. PATIENT-LVL III: CPT | Mod: PBBFAC,,, | Performed by: ORTHOPAEDIC SURGERY

## 2022-10-28 PROCEDURE — 1159F MED LIST DOCD IN RCRD: CPT | Mod: CPTII,,, | Performed by: ORTHOPAEDIC SURGERY

## 2022-10-28 PROCEDURE — 99212 OFFICE O/P EST SF 10 MIN: CPT | Mod: S$PBB,,, | Performed by: ORTHOPAEDIC SURGERY
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
## 2022-11-18 ENCOUNTER — OFFICE VISIT (OUTPATIENT)
Dept: ORTHOPEDICS | Facility: CLINIC | Age: 47
End: 2022-11-18
Payer: MEDICAID

## 2022-11-18 VITALS — BODY MASS INDEX: 35.01 KG/M2 | HEIGHT: 65 IN | WEIGHT: 210.13 LBS

## 2022-11-18 DIAGNOSIS — M17.12 PRIMARY OSTEOARTHRITIS OF LEFT KNEE: Primary | ICD-10-CM

## 2022-11-18 DIAGNOSIS — Z96.651 S/P TOTAL KNEE ARTHROPLASTY, RIGHT: ICD-10-CM

## 2022-11-18 PROCEDURE — 99213 OFFICE O/P EST LOW 20 MIN: CPT | Mod: S$PBB,25,, | Performed by: ORTHOPAEDIC SURGERY

## 2022-11-18 PROCEDURE — 99999 PR PBB SHADOW E&M-EST. PATIENT-LVL III: CPT | Mod: PBBFAC,,, | Performed by: ORTHOPAEDIC SURGERY

## 2022-11-18 PROCEDURE — 1160F PR REVIEW ALL MEDS BY PRESCRIBER/CLIN PHARMACIST DOCUMENTED: ICD-10-PCS | Mod: CPTII,,, | Performed by: ORTHOPAEDIC SURGERY

## 2022-11-18 PROCEDURE — 20610 DRAIN/INJ JOINT/BURSA W/O US: CPT | Mod: PBBFAC,PN | Performed by: ORTHOPAEDIC SURGERY

## 2022-11-18 PROCEDURE — 3008F BODY MASS INDEX DOCD: CPT | Mod: CPTII,,, | Performed by: ORTHOPAEDIC SURGERY

## 2022-11-18 PROCEDURE — 99213 PR OFFICE/OUTPT VISIT, EST, LEVL III, 20-29 MIN: ICD-10-PCS | Mod: S$PBB,25,, | Performed by: ORTHOPAEDIC SURGERY

## 2022-11-18 PROCEDURE — 99999 PR PBB SHADOW E&M-EST. PATIENT-LVL III: ICD-10-PCS | Mod: PBBFAC,,, | Performed by: ORTHOPAEDIC SURGERY

## 2022-11-18 PROCEDURE — 1159F MED LIST DOCD IN RCRD: CPT | Mod: CPTII,,, | Performed by: ORTHOPAEDIC SURGERY

## 2022-11-18 PROCEDURE — 3008F PR BODY MASS INDEX (BMI) DOCUMENTED: ICD-10-PCS | Mod: CPTII,,, | Performed by: ORTHOPAEDIC SURGERY

## 2022-11-18 PROCEDURE — 1159F PR MEDICATION LIST DOCUMENTED IN MEDICAL RECORD: ICD-10-PCS | Mod: CPTII,,, | Performed by: ORTHOPAEDIC SURGERY

## 2022-11-18 PROCEDURE — 1160F RVW MEDS BY RX/DR IN RCRD: CPT | Mod: CPTII,,, | Performed by: ORTHOPAEDIC SURGERY

## 2022-11-18 PROCEDURE — 99213 OFFICE O/P EST LOW 20 MIN: CPT | Mod: PBBFAC,PN,25 | Performed by: ORTHOPAEDIC SURGERY

## 2022-11-18 PROCEDURE — 20610 LARGE JOINT ASPIRATION/INJECTION: L KNEE: ICD-10-PCS | Mod: S$PBB,LT,, | Performed by: ORTHOPAEDIC SURGERY

## 2022-11-18 RX ORDER — FUROSEMIDE 20 MG/1
20 TABLET ORAL
COMMUNITY
Start: 2022-11-10 | End: 2023-07-11

## 2022-11-18 RX ORDER — FERROUS SULFATE 325(65) MG
325 TABLET ORAL
COMMUNITY
Start: 2022-11-17 | End: 2023-02-15

## 2022-11-18 RX ORDER — TRIAMCINOLONE ACETONIDE 40 MG/ML
40 INJECTION, SUSPENSION INTRA-ARTICULAR; INTRAMUSCULAR
Status: DISCONTINUED | OUTPATIENT
Start: 2022-11-18 | End: 2022-11-18 | Stop reason: HOSPADM

## 2022-11-18 RX ORDER — VENLAFAXINE HYDROCHLORIDE 75 MG/1
75 CAPSULE, EXTENDED RELEASE ORAL
COMMUNITY
Start: 2022-10-07 | End: 2023-02-14 | Stop reason: SDUPTHER

## 2022-11-18 RX ADMIN — TRIAMCINOLONE ACETONIDE 40 MG: 40 INJECTION, SUSPENSION INTRA-ARTICULAR; INTRAMUSCULAR at 09:11

## 2022-11-18 NOTE — PROCEDURES
Large Joint Aspiration/Injection: L knee    Date/Time: 11/18/2022 9:15 AM  Performed by: Storm See MD  Authorized by: Storm See MD     Location:  Knee  Site:  L knee  Medications:  40 mg triamcinolone acetonide 40 mg/mL     After obtaining verbal informed consent the patient's left knee was prepped aseptically and injected through an inferior lateral approach using 40 mg of triamcinolone and 1 cc of 1% plain Xylocaine.  The patient was warned about postinjection flare and how to manage it with ice, rest and over-the-counter analgesics.  They're advised to contact me for any severe, uncontrolled pain.
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
## 2022-11-22 NOTE — TELEPHONE ENCOUNTER
----- Message from Cori Moran sent at 6/25/2018  2:31 PM CDT -----  Contact: Angelina  with Dr Black Office/[phone] ext [phone]  Angelina is waiting on clearance form for patient to be off ELIQUIS 5 mg . Please call her cell [phone] Please advise , Thanks    
Angelina stated that she spoke to Dr. Simental personally after Hours   Please advise pt has never seen Dr. Simental   
Detail message on her voice mail with information and Dr. Simental never spoke to her   
No, that was not me.  I did not speak to her after hours.   I have never spoken to her.  I would have left a note in the chart if I had spoken to her after hours.  Whoever prescribed Eliquis needs to determine whether she can hold it for surgery.    Dr. Jones Rowley wrote the rx  
Warm

## 2022-12-09 ENCOUNTER — PATIENT MESSAGE (OUTPATIENT)
Dept: ORTHOPEDICS | Facility: CLINIC | Age: 47
End: 2022-12-09
Payer: MEDICAID

## 2023-01-10 ENCOUNTER — HOSPITAL ENCOUNTER (OUTPATIENT)
Dept: RADIOLOGY | Facility: HOSPITAL | Age: 48
Discharge: HOME | End: 2023-01-10
Attending: ORTHOPAEDIC SURGERY
Payer: MEDICAID

## 2023-01-10 DIAGNOSIS — M17.11 PRIMARY OSTEOARTHRITIS OF RIGHT KNEE: ICD-10-CM

## 2023-01-10 DIAGNOSIS — Z96.651 S/P TOTAL KNEE ARTHROPLASTY, RIGHT: ICD-10-CM

## 2023-01-10 PROCEDURE — 73564 XR KNEE ORTHO BILAT WITH FLEXION: ICD-10-PCS | Mod: 26,50,, | Performed by: RADIOLOGY

## 2023-01-10 PROCEDURE — 73564 X-RAY EXAM KNEE 4 OR MORE: CPT | Mod: TC,50,FY

## 2023-01-10 PROCEDURE — 73564 X-RAY EXAM KNEE 4 OR MORE: CPT | Mod: 26,50,, | Performed by: RADIOLOGY

## 2023-01-11 ENCOUNTER — TELEPHONE (OUTPATIENT)
Dept: ORTHOPEDICS | Facility: CLINIC | Age: 48
End: 2023-01-11
Payer: MEDICAID

## 2023-01-11 NOTE — TELEPHONE ENCOUNTER
----- Message from Karol Fleming sent at 2023  1:03 PM CST -----  Regardinnd xrays ordered  Contact: pt  .Type:  Needs Medical Advice    Who Called: pt    Would the patient rather a call back or a response via [facility]? call  Best Call Back Number: [phone]  Additional Information: pt just took xrays yesterday 1/10/2023 and is scheduled for more on 2023 at 8 am. Pt does not think insurance will cover the service twice.

## 2023-01-12 ENCOUNTER — OFFICE VISIT (OUTPATIENT)
Dept: ORTHOPEDICS | Facility: CLINIC | Age: 48
End: 2023-01-12
Payer: MEDICAID

## 2023-01-12 VITALS — WEIGHT: 204 LBS | BODY MASS INDEX: 33.99 KG/M2 | HEIGHT: 65 IN

## 2023-01-12 DIAGNOSIS — M17.12 PRIMARY OSTEOARTHRITIS OF LEFT KNEE: Primary | ICD-10-CM

## 2023-01-12 PROCEDURE — 1159F PR MEDICATION LIST DOCUMENTED IN MEDICAL RECORD: ICD-10-PCS | Mod: CPTII,,, | Performed by: ORTHOPAEDIC SURGERY

## 2023-01-12 PROCEDURE — 1160F PR REVIEW ALL MEDS BY PRESCRIBER/CLIN PHARMACIST DOCUMENTED: ICD-10-PCS | Mod: CPTII,,, | Performed by: ORTHOPAEDIC SURGERY

## 2023-01-12 PROCEDURE — 99213 OFFICE O/P EST LOW 20 MIN: CPT | Mod: PBBFAC,PN,25 | Performed by: ORTHOPAEDIC SURGERY

## 2023-01-12 PROCEDURE — 20610 DRAIN/INJ JOINT/BURSA W/O US: CPT | Mod: PBBFAC,PN | Performed by: ORTHOPAEDIC SURGERY

## 2023-01-12 PROCEDURE — 20610 LARGE JOINT ASPIRATION/INJECTION: L KNEE: ICD-10-PCS | Mod: S$PBB,LT,, | Performed by: ORTHOPAEDIC SURGERY

## 2023-01-12 PROCEDURE — 99214 OFFICE O/P EST MOD 30 MIN: CPT | Mod: S$PBB,25,, | Performed by: ORTHOPAEDIC SURGERY

## 2023-01-12 PROCEDURE — 3008F PR BODY MASS INDEX (BMI) DOCUMENTED: ICD-10-PCS | Mod: CPTII,,, | Performed by: ORTHOPAEDIC SURGERY

## 2023-01-12 PROCEDURE — 1160F RVW MEDS BY RX/DR IN RCRD: CPT | Mod: CPTII,,, | Performed by: ORTHOPAEDIC SURGERY

## 2023-01-12 PROCEDURE — 99214 PR OFFICE/OUTPT VISIT, EST, LEVL IV, 30-39 MIN: ICD-10-PCS | Mod: S$PBB,25,, | Performed by: ORTHOPAEDIC SURGERY

## 2023-01-12 PROCEDURE — 99999 PR PBB SHADOW E&M-EST. PATIENT-LVL III: CPT | Mod: PBBFAC,,, | Performed by: ORTHOPAEDIC SURGERY

## 2023-01-12 PROCEDURE — 1159F MED LIST DOCD IN RCRD: CPT | Mod: CPTII,,, | Performed by: ORTHOPAEDIC SURGERY

## 2023-01-12 PROCEDURE — 3008F BODY MASS INDEX DOCD: CPT | Mod: CPTII,,, | Performed by: ORTHOPAEDIC SURGERY

## 2023-01-12 PROCEDURE — 99999 PR PBB SHADOW E&M-EST. PATIENT-LVL III: ICD-10-PCS | Mod: PBBFAC,,, | Performed by: ORTHOPAEDIC SURGERY

## 2023-01-12 RX ORDER — TRIAMCINOLONE ACETONIDE 40 MG/ML
40 INJECTION, SUSPENSION INTRA-ARTICULAR; INTRAMUSCULAR
Status: DISCONTINUED | OUTPATIENT
Start: 2023-01-12 | End: 2023-01-12 | Stop reason: HOSPADM

## 2023-01-12 RX ADMIN — TRIAMCINOLONE ACETONIDE 40 MG: 40 INJECTION, SUSPENSION INTRA-ARTICULAR; INTRAMUSCULAR at 09:01

## 2023-01-12 NOTE — PROCEDURES
Large Joint Aspiration/Injection: L knee    Date/Time: 1/12/2023 9:15 AM  Performed by: Storm See MD  Authorized by: Storm See MD     Location:  Knee  Site:  L knee  Medications:  40 mg triamcinolone acetonide 40 mg/mL     After obtaining verbal informed consent the patient's left knee was prepped aseptically and injected through an inferior lateral approach using 40 mg of triamcinolone and 1 cc of 1% plain Xylocaine.  The patient was warned about postinjection flare and how to manage it with ice, rest and over-the-counter analgesics.  They're advised to contact me for any severe, uncontrolled pain.

## 2023-01-12 NOTE — PROGRESS NOTES
Subjective:      Patient ID: Cristina Sprague is a 47 y.o. female.    Chief Complaint:  Left knee pain  HPI    Follow-up for osteoarthritis.  The patient had a flare of symptoms a few weeks ago.  She feels much better now.  She feels injections have been helpful.  She has extensive travel plans reported in the next couple of months.    The patient continues to be very comfortable with her right total knee.        Review of Systems   Constitutional: Negative for fever and weight loss.   HENT:  Negative for congestion.    Eyes:  Negative for visual disturbance.   Cardiovascular:  Negative for chest pain.   Respiratory:  Negative for shortness of breath.    Hematologic/Lymphatic: Negative for bleeding problem. Does not bruise/bleed easily.   Skin:  Negative for poor wound healing.   Musculoskeletal:  Positive for joint pain.   Gastrointestinal:  Negative for abdominal pain.   Genitourinary:  Negative for dysuria.   Neurological:  Negative for seizures.   Psychiatric/Behavioral:  Negative for altered mental status.    Allergic/Immunologic: Negative for persistent infections.       Objective:      Ortho/SPM Exam      Left knee     The patient is not in acute distress.   Body habitus is normal.   Sclera appear normal  No respiratory distress  The patient walks without a limp.  Resisted SLR negative.   The skin over the knee is intact.  Knee effusion 0  Tendernes is located absent.  Range of motion- Flexion full, Extension full.   Ligament exam:              MCL intact              Lachman intact              Post sag intact              LCL 1+ laxity  Patellar apprehension negative.  Popliteal cyst negative  Patellar crepitation absent.  Flexion/pinch negative.  Pulses DP present, PT present.  Motor normal 5/5 strength in all tested muscle groups.   Sensory normal.    I reviewed the relevant imaging for the patient's condition:  Left knee films show 50% lateral narrowing with osteophytes and sclerosis            Assessment:        Encounter Diagnosis   Name Primary?    Primary osteoarthritis of left knee Yes        The condition is radiographically moderate to advanced.  The patient does not have disabling symptoms at this time.  Gradual progression is likely.          Plan:       Cristina was seen today for post-op evaluation.    Diagnoses and all orders for this visit:    Primary osteoarthritis of left knee          I explained my diagnostic impression and the reasoning behind it in detail, using layman's terms.  Models and/or pictures were used to help in the explanation.    Because the patient has travel plans I recommend that we inject her left knee a little early, as she had it injected about 6 weeks ago.  I reassured her that I do not expect any negative consequences other than the possibility that the injection may not be as dramatically beneficial as previously.  She gave informed consent for this.    I explained the potential role of surgery in the treatment of this condition to the patient.  They understand that if nonsurgical measures do not adequately control symptoms, surgery will be considered in the future.

## 2023-02-14 ENCOUNTER — OFFICE VISIT (OUTPATIENT)
Dept: OBSTETRICS AND GYNECOLOGY | Facility: CLINIC | Age: 48
End: 2023-02-14
Payer: MEDICAID

## 2023-02-14 VITALS — HEIGHT: 65 IN | BODY MASS INDEX: 35.71 KG/M2 | WEIGHT: 214.31 LBS

## 2023-02-14 DIAGNOSIS — N95.1 VASOMOTOR SYMPTOMS DUE TO MENOPAUSE: Primary | ICD-10-CM

## 2023-02-14 DIAGNOSIS — Z12.31 SCREENING MAMMOGRAM FOR BREAST CANCER: ICD-10-CM

## 2023-02-14 DIAGNOSIS — Z12.11 COLON CANCER SCREENING: ICD-10-CM

## 2023-02-14 PROBLEM — R60.0 EDEMA OF BOTH LEGS: Status: ACTIVE | Noted: 2022-11-10

## 2023-02-14 PROBLEM — D50.9 IDA (IRON DEFICIENCY ANEMIA): Status: ACTIVE | Noted: 2023-01-04

## 2023-02-14 PROBLEM — K21.9 GASTROESOPHAGEAL REFLUX DISEASE WITHOUT ESOPHAGITIS: Status: ACTIVE | Noted: 2022-11-10

## 2023-02-14 PROBLEM — Z79.01 LONG TERM CURRENT USE OF ANTICOAGULANT THERAPY: Status: ACTIVE | Noted: 2022-04-11

## 2023-02-14 PROBLEM — Z98.84 HISTORY OF GASTRIC BYPASS: Status: ACTIVE | Noted: 2022-04-11

## 2023-02-14 PROBLEM — E72.12 METHYLENETETRAHYDROFOLATE REDUCTASE (MTHFR) DEFICIENCY: Status: ACTIVE | Noted: 2022-04-11

## 2023-02-14 PROCEDURE — 99215 OFFICE O/P EST HI 40 MIN: CPT | Mod: S$GLB,,, | Performed by: OBSTETRICS & GYNECOLOGY

## 2023-02-14 PROCEDURE — 3008F PR BODY MASS INDEX (BMI) DOCUMENTED: ICD-10-PCS | Mod: CPTII,S$GLB,, | Performed by: OBSTETRICS & GYNECOLOGY

## 2023-02-14 PROCEDURE — 1159F MED LIST DOCD IN RCRD: CPT | Mod: CPTII,S$GLB,, | Performed by: OBSTETRICS & GYNECOLOGY

## 2023-02-14 PROCEDURE — 1160F RVW MEDS BY RX/DR IN RCRD: CPT | Mod: CPTII,S$GLB,, | Performed by: OBSTETRICS & GYNECOLOGY

## 2023-02-14 PROCEDURE — 1160F PR REVIEW ALL MEDS BY PRESCRIBER/CLIN PHARMACIST DOCUMENTED: ICD-10-PCS | Mod: CPTII,S$GLB,, | Performed by: OBSTETRICS & GYNECOLOGY

## 2023-02-14 PROCEDURE — 99215 PR OFFICE/OUTPT VISIT, EST, LEVL V, 40-54 MIN: ICD-10-PCS | Mod: S$GLB,,, | Performed by: OBSTETRICS & GYNECOLOGY

## 2023-02-14 PROCEDURE — 1159F PR MEDICATION LIST DOCUMENTED IN MEDICAL RECORD: ICD-10-PCS | Mod: CPTII,S$GLB,, | Performed by: OBSTETRICS & GYNECOLOGY

## 2023-02-14 PROCEDURE — 3008F BODY MASS INDEX DOCD: CPT | Mod: CPTII,S$GLB,, | Performed by: OBSTETRICS & GYNECOLOGY

## 2023-02-14 RX ORDER — VENLAFAXINE HYDROCHLORIDE 150 MG/1
150 CAPSULE, EXTENDED RELEASE ORAL DAILY
Qty: 90 CAPSULE | Refills: 3 | Status: SHIPPED | OUTPATIENT
Start: 2023-02-14 | End: 2024-01-17
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
## 2023-02-16 ENCOUNTER — HOSPITAL ENCOUNTER (OUTPATIENT)
Dept: RADIOLOGY | Facility: HOSPITAL | Age: 48
Discharge: HOME | End: 2023-02-16
Attending: OBSTETRICS & GYNECOLOGY
Payer: MEDICAID

## 2023-02-16 DIAGNOSIS — Z12.31 SCREENING MAMMOGRAM FOR BREAST CANCER: ICD-10-CM

## 2023-02-16 PROCEDURE — 77063 BREAST TOMOSYNTHESIS BI: CPT | Mod: 26,,, | Performed by: RADIOLOGY

## 2023-02-16 PROCEDURE — 77067 SCR MAMMO BI INCL CAD: CPT | Mod: TC

## 2023-02-16 PROCEDURE — 77067 SCR MAMMO BI INCL CAD: CPT | Mod: 26,,, | Performed by: RADIOLOGY

## 2023-02-16 PROCEDURE — 77067 MAMMO DIGITAL SCREENING BILAT WITH TOMO: ICD-10-PCS | Mod: 26,,, | Performed by: RADIOLOGY

## 2023-02-16 PROCEDURE — 77063 MAMMO DIGITAL SCREENING BILAT WITH TOMO: ICD-10-PCS | Mod: 26,,, | Performed by: RADIOLOGY

## 2023-05-11 ENCOUNTER — PATIENT MESSAGE (OUTPATIENT)
Dept: ORTHOPEDICS | Facility: CLINIC | Age: 48
End: 2023-05-11
Payer: MEDICAID

## 2023-05-17 ENCOUNTER — OFFICE VISIT (OUTPATIENT)
Dept: OBSTETRICS AND GYNECOLOGY | Facility: CLINIC | Age: 48
End: 2023-05-17
Payer: MEDICAID

## 2023-05-17 VITALS
SYSTOLIC BLOOD PRESSURE: 110 MMHG | WEIGHT: 209.69 LBS | DIASTOLIC BLOOD PRESSURE: 70 MMHG | BODY MASS INDEX: 34.93 KG/M2 | HEIGHT: 65 IN

## 2023-05-17 DIAGNOSIS — Z01.419 ENCOUNTER FOR ANNUAL ROUTINE GYNECOLOGICAL EXAMINATION: Primary | ICD-10-CM

## 2023-05-17 DIAGNOSIS — R30.0 DYSURIA: ICD-10-CM

## 2023-05-17 DIAGNOSIS — N95.1 VASOMOTOR SYMPTOMS DUE TO MENOPAUSE: ICD-10-CM

## 2023-05-17 DIAGNOSIS — N95.1 INSOMNIA ASSOCIATED WITH MENOPAUSE: ICD-10-CM

## 2023-05-17 DIAGNOSIS — Z12.31 BREAST CANCER SCREENING BY MAMMOGRAM: ICD-10-CM

## 2023-05-17 PROCEDURE — 3074F SYST BP LT 130 MM HG: CPT | Mod: CPTII,S$GLB,, | Performed by: OBSTETRICS & GYNECOLOGY

## 2023-05-17 PROCEDURE — 1159F MED LIST DOCD IN RCRD: CPT | Mod: CPTII,S$GLB,, | Performed by: OBSTETRICS & GYNECOLOGY

## 2023-05-17 PROCEDURE — 3074F PR MOST RECENT SYSTOLIC BLOOD PRESSURE < 130 MM HG: ICD-10-PCS | Mod: CPTII,S$GLB,, | Performed by: OBSTETRICS & GYNECOLOGY

## 2023-05-17 PROCEDURE — 3008F PR BODY MASS INDEX (BMI) DOCUMENTED: ICD-10-PCS | Mod: CPTII,S$GLB,, | Performed by: OBSTETRICS & GYNECOLOGY

## 2023-05-17 PROCEDURE — 3078F PR MOST RECENT DIASTOLIC BLOOD PRESSURE < 80 MM HG: ICD-10-PCS | Mod: CPTII,S$GLB,, | Performed by: OBSTETRICS & GYNECOLOGY

## 2023-05-17 PROCEDURE — 3008F BODY MASS INDEX DOCD: CPT | Mod: CPTII,S$GLB,, | Performed by: OBSTETRICS & GYNECOLOGY

## 2023-05-17 PROCEDURE — 3078F DIAST BP <80 MM HG: CPT | Mod: CPTII,S$GLB,, | Performed by: OBSTETRICS & GYNECOLOGY

## 2023-05-17 PROCEDURE — 99396 PREV VISIT EST AGE 40-64: CPT | Mod: S$GLB,,, | Performed by: OBSTETRICS & GYNECOLOGY

## 2023-05-17 PROCEDURE — 1159F PR MEDICATION LIST DOCUMENTED IN MEDICAL RECORD: ICD-10-PCS | Mod: CPTII,S$GLB,, | Performed by: OBSTETRICS & GYNECOLOGY

## 2023-05-17 PROCEDURE — 81003 URINALYSIS AUTO W/O SCOPE: CPT | Performed by: OBSTETRICS & GYNECOLOGY

## 2023-05-17 PROCEDURE — 99396 PR PREVENTIVE VISIT,EST,40-64: ICD-10-PCS | Mod: S$GLB,,, | Performed by: OBSTETRICS & GYNECOLOGY

## 2023-05-17 NOTE — PATIENT INSTRUCTIONS
Please check out the [employer] PATIENT WEBSITE.  The site has education materials, patient stories, expert views, and a portal for you to ask questions.       [email or web address]      As always, please let me know if you have any questions.     Dr. Delma Roach

## 2023-05-17 NOTE — PROGRESS NOTES
Chief Complaint: Well Woman Exam     HPI:      Cristina Sprague is a 47 y.o.  s/p RALH/BSO for benign adnexal mass in 2022 who presents for annual exam. She is currently complaining of  vasomotor symptoms that are improved since increasing Effexor to 150 mg daily and persistent insomnia and decreased libido . HRT contraindicated due to history of VTEs.     Additionally, notes mild dysuria and vulvar itching that began this morning. Denies vaginal discharge.     Ms. Sprague is currently sexually active with a single male partner. She declines STD screening today. Patient does not have regular monthly menses. Patient's last menstrual period was 2022. She is currently using no method for contraception.    Previous Pap:  no abnormalities (2021)  Previous Mammogram:   Results for orders placed during the hospital encounter of 23    Mammo Digital Screening Bilat w/ Marco Antonio    Narrative  Result:  Mammo Digital Screening Bilat w/ Marco Antonio    History:  Patient is 47 y.o. and is seen for a screening mammogram.      Films Compared:  Prior images (if available) were compared.    Findings:  This procedure was performed using tomosynthesis.  Computer-aided detection was utilized in the interpretation of this examination.    The breasts have scattered areas of fibroglandular density. There is no evidence of suspicious masses, microcalcifications or architectural distortion.    Impression  No mammographic evidence of malignancy.    BI-RADS Category 1: Negative    Recommendation:  Routine screening mammogram in 1 year is recommended.    Your estimated lifetime risk of breast cancer (to age 85) based on Tyrer-Cuzick risk assessment model is 5.74 %.  According to the American Cancer Society, patients with a lifetime breast cancer risk of 20% or higher might benefit from supplemental screening tests. ??     Most Recent Dexa: not indicated  Colonoscopy: ordered by PCP    COVID vaccine: completed series  Gardasil:Has  never had     Patient Active Problem List   Diagnosis    Supervision of other normal pregnancy    History of     DVT (deep venous thrombosis)    Obesity    Rh negative status during pregnancy    Ectopic pregnancy    Anxiety disorder, unspecified    History of pulmonary embolism    History of deep venous thrombosis    Chronic instability of knee    Contact with and (suspected) exposure to other viral communicable diseases    Depressive disorder    Knee pain    Lumbar radiculopathy    Osteoarthritis of knee    Screening for other specific viral and chlamydial diseases    Sprain of MCL (medial collateral ligament) of knee    COVID-19    History of right knee joint replacement    Decreased strength, endurance, and mobility    Decreased range of motion (ROM) of knee    s/p RA-TLH/BS/RO/cystectomy/appendectomy/JOEY    Pain of left upper extremity    Weakness    Stiffness due to immobility    Edema of both legs    Gastroesophageal reflux disease without esophagitis    History of gastric bypass    YECENIA (iron deficiency anemia)    Long term current use of anticoagulant therapy    Methylenetetrahydrofolate reductase (MTHFR) deficiency       Past Medical History:   Diagnosis Date    Ectopic pregnancy     History of blood clots     x 4-5?, all while pregnant    Morbid obesity     MTHFR mutation     Pulmonary embolism 2006    Obesity, OCPs       Past Surgical History:   Procedure Laterality Date    BARIATRIC SURGERY  2020     SECTION      x2    CHOLECYSTECTOMY      ASUNCION FILTER PLACEMENT  2018    KNEE ARTHROPLASTY Right 2021    Procedure: ARTHROPLASTY, KNEE;  Surgeon: Storm See MD;  Location: [facility] OR;  Service: Orthopedics;  Laterality: Right;  depuy notified  CC  Depuy confirmed 21 AM    LAPAROSCOPIC APPENDECTOMY N/A 2022    Procedure: APPENDECTOMY, LAPAROSCOPIC;  Surgeon: Aurelio Inman MD;  Location: [facility] OR [address] [place] [address];  Service: Oncology;  Laterality: N/A;    LUMBAR FUSION  " 2018    L4-L5    ROBOT-ASSISTED LAPAROSCOPIC ABDOMINAL HYSTERECTOMY USING DA FLORIAN XI  2022    Procedure: XI ROBOTIC HYSTERECTOMY;  Surgeon: Aurelio Inman MD;  [facility];  Service: Oncology;;    ROBOT-ASSISTED LAPAROSCOPIC LYSIS OF ADHESIONS USING DA FLORIAN XI  2022    Procedure: XI ROBOTIC LYSIS, ADHESIONS;  Surgeon: Aurelio Inman MD;  [facility];  Service: Oncology;;    SALPINGECTOMY Left 2022    Procedure: SALPINGECTOMY;  Surgeon: Aurelio Inman MD;  [facility];  Service: Oncology;  Laterality: Left;    SALPINGOOPHORECTOMY Right 2022    Procedure: SALPINGO-OOPHORECTOMY;  Surgeon: Aurelio Inman MD;  Location: [facility] OR [address] [place] [address];  Service: Oncology;  Laterality: Right;  PROCEED AS INDICATED    TUBAL LIGATION         OB History          3    Para   2    Term   2            AB   1    Living   2         SAB        IAB        Ectopic   1    Multiple        Live Births   2           Obstetric Comments   14/q 26-31 days/up to 14 days  No abnormal pap  No STDs  CD x 2, Ectopic s/p MTX  Multiple VTE on OCPs with pregnancy (IVC filter in place)               ROS:     Review of Systems   Constitutional:  Negative for activity change, fatigue and unexpected weight change.   Respiratory:  Negative for shortness of breath.    Cardiovascular:  Negative for chest pain.   Gastrointestinal:  Negative for abdominal pain, blood in stool, constipation and diarrhea.   Endocrine: Negative for cold intolerance and heat intolerance.   Genitourinary:  Positive for hot flashes. Negative for bladder incontinence, dyspareunia, dysuria, frequency, vaginal bleeding and vaginal dryness.   Integumentary:  Negative for breast mass, breast discharge and breast tenderness.   Psychiatric/Behavioral:  Negative for dysphoric mood. The patient is not nervous/anxious.    Breast: Negative for mass and tenderness    Physical Exam:      PHYSICAL EXAM:  /70   Ht 5' 5" " (1.651 m)   Wt 95.1 kg (209 lb 10.5 oz)   LMP 02/11/2022   BMI 34.89 kg/m²   Body mass index is 34.89 kg/m².     APPEARANCE: Well nourished, well developed, in no acute distress.  PSYCH: Appropriate mood and affect.  SKIN: No acne or hirsutism  NECK: Neck symmetric without masses or thyromegaly  NODES: No inguinal, axillary, or supraclavicular lymph node enlargement  CHEST: Normal respiratory effort.  ABDOMEN: Soft.  No tenderness or masses.   BREASTS: Symmetrical, no skin changes or visible lesions.  No palpable masses or nipple discharge bilaterally.  PELVIC: Normal external genitalia without lesions.  Normal hair distribution.  Adequate perineal body, normal urethral meatus.  Vagina moist and well rugated without lesions or discharge.  Uterus and cervix surgically absent.  EXTREMITIES: No edema.    Assessment:     1. Encounter for annual routine gynecological examination        2. Breast cancer screening by mammogram  Mammo Digital Screening Bilat w/ Marco Antonio      3. Vasomotor symptoms due to menopause        4. Insomnia associated with menopause  Ambulatory referral/consult to Psychology      5. Dysuria  Urinalysis, Reflex to Urine Culture Urine, Clean Catch            Plan:     Clinical breast exam performed.  Pap not indicated.  Mammogram UTD, ordered for 2024.  DEXA at 67 or as needed.  Colonoscopy per PCP.  Contraception: s/p hysterectomy.  Menopause: continue Effexor, recommend CBT-I for insomnia.  Follow up in about 1 year (around 5/17/2024) for annual well woman exam or as needed.    Counseling:     Patient was counseled today on current ASCCP pap guidelines, the recommendation for yearly pelvic exams, healthy diet and exercise routines, breast self awareness and annual mammograms. She is to see her PCP for other health maintenance.       Use of the [facility] Patient Portal discussed and encouraged during today's visit.         Delma Roach MD  [facility]  05/17/2023

## 2023-05-18 LAB
BILIRUB UR QL STRIP: NEGATIVE
CLARITY UR REFRACT.AUTO: CLEAR
COLOR UR AUTO: YELLOW
GLUCOSE UR QL STRIP: NEGATIVE
HGB UR QL STRIP: NEGATIVE
KETONES UR QL STRIP: NEGATIVE
LEUKOCYTE ESTERASE UR QL STRIP: NEGATIVE
NITRITE UR QL STRIP: NEGATIVE
PH UR STRIP: 7 [PH] (ref 5–8)
PROT UR QL STRIP: NEGATIVE
SP GR UR STRIP: 1.03 (ref 1–1.03)
URN SPEC COLLECT METH UR: NORMAL

## 2023-06-08 ENCOUNTER — TELEPHONE (OUTPATIENT)
Dept: SPORTS MEDICINE | Facility: CLINIC | Age: 48
End: 2023-06-08
Payer: MEDICAID

## 2023-06-08 NOTE — TELEPHONE ENCOUNTER
Patient is scheduled for 06/09, and was informed that we are no longer in [place], and we will see her in [place]. Patient has all appointment details.

## 2023-06-08 NOTE — TELEPHONE ENCOUNTER
----- Message from Godwin Mendoza sent at 6/8/2023 11:22 AM CDT -----  Contact: Patient  Type:  Patient Call          Who Called: patient         Does the patient know what this is regarding?: requesting a call back from a missed call ;please advise           Would the patient rather a call back or a response via [facility]? Call           Best Call Back Number:[phone] (home)              Additional Information:

## 2023-06-09 ENCOUNTER — OFFICE VISIT (OUTPATIENT)
Dept: SPORTS MEDICINE | Facility: CLINIC | Age: 48
End: 2023-06-09
Payer: MEDICAID

## 2023-06-09 VITALS — WEIGHT: 216.94 LBS | BODY MASS INDEX: 36.14 KG/M2 | HEIGHT: 65 IN

## 2023-06-09 DIAGNOSIS — M25.512 ACUTE PAIN OF LEFT SHOULDER: Primary | ICD-10-CM

## 2023-06-09 PROCEDURE — 1159F PR MEDICATION LIST DOCUMENTED IN MEDICAL RECORD: ICD-10-PCS | Mod: CPTII,,, | Performed by: ORTHOPAEDIC SURGERY

## 2023-06-09 PROCEDURE — 3008F BODY MASS INDEX DOCD: CPT | Mod: CPTII,,, | Performed by: ORTHOPAEDIC SURGERY

## 2023-06-09 PROCEDURE — 1159F MED LIST DOCD IN RCRD: CPT | Mod: CPTII,,, | Performed by: ORTHOPAEDIC SURGERY

## 2023-06-09 PROCEDURE — 1160F RVW MEDS BY RX/DR IN RCRD: CPT | Mod: CPTII,,, | Performed by: ORTHOPAEDIC SURGERY

## 2023-06-09 PROCEDURE — 1160F PR REVIEW ALL MEDS BY PRESCRIBER/CLIN PHARMACIST DOCUMENTED: ICD-10-PCS | Mod: CPTII,,, | Performed by: ORTHOPAEDIC SURGERY

## 2023-06-09 PROCEDURE — 99213 OFFICE O/P EST LOW 20 MIN: CPT | Mod: PBBFAC,PN | Performed by: ORTHOPAEDIC SURGERY

## 2023-06-09 PROCEDURE — 99214 OFFICE O/P EST MOD 30 MIN: CPT | Mod: S$PBB,,, | Performed by: ORTHOPAEDIC SURGERY

## 2023-06-09 PROCEDURE — 99999 PR PBB SHADOW E&M-EST. PATIENT-LVL III: ICD-10-PCS | Mod: PBBFAC,,, | Performed by: ORTHOPAEDIC SURGERY

## 2023-06-09 PROCEDURE — 99999 PR PBB SHADOW E&M-EST. PATIENT-LVL III: CPT | Mod: PBBFAC,,, | Performed by: ORTHOPAEDIC SURGERY

## 2023-06-09 PROCEDURE — 3008F PR BODY MASS INDEX (BMI) DOCUMENTED: ICD-10-PCS | Mod: CPTII,,, | Performed by: ORTHOPAEDIC SURGERY

## 2023-06-09 PROCEDURE — 99214 PR OFFICE/OUTPT VISIT, EST, LEVL IV, 30-39 MIN: ICD-10-PCS | Mod: S$PBB,,, | Performed by: ORTHOPAEDIC SURGERY
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
## 2023-06-16 ENCOUNTER — TELEPHONE (OUTPATIENT)
Dept: ENDOSCOPY | Facility: HOSPITAL | Age: 48
End: 2023-06-16

## 2023-06-16 ENCOUNTER — CLINICAL SUPPORT (OUTPATIENT)
Dept: ENDOSCOPY | Facility: HOSPITAL | Age: 48
End: 2023-06-16
Attending: OBSTETRICS & GYNECOLOGY
Payer: MEDICAID

## 2023-06-16 ENCOUNTER — PATIENT MESSAGE (OUTPATIENT)
Dept: PODIATRY | Facility: CLINIC | Age: 48
End: 2023-06-16
Payer: MEDICAID

## 2023-06-16 ENCOUNTER — PATIENT MESSAGE (OUTPATIENT)
Dept: ENDOSCOPY | Facility: HOSPITAL | Age: 48
End: 2023-06-16

## 2023-06-16 VITALS — WEIGHT: 216 LBS | BODY MASS INDEX: 35.99 KG/M2 | HEIGHT: 65 IN

## 2023-06-16 DIAGNOSIS — Z12.11 COLON CANCER SCREENING: ICD-10-CM

## 2023-06-16 RX ORDER — SODIUM, POTASSIUM,MAG SULFATES 17.5-3.13G
1 SOLUTION, RECONSTITUTED, ORAL ORAL DAILY
Qty: 1 KIT | Refills: 0 | Status: SHIPPED | OUTPATIENT
Start: 2023-06-16 | End: 2023-06-18

## 2023-06-16 RX ORDER — SEMAGLUTIDE 0.68 MG/ML
INJECTION, SOLUTION SUBCUTANEOUS
COMMUNITY
Start: 2023-05-18

## 2023-06-16 RX ORDER — FERROUS SULFATE 325(65) MG
TABLET ORAL
COMMUNITY
Start: 2023-06-14

## 2023-06-16 NOTE — TELEPHONE ENCOUNTER
Message sent to Endoscopy clearance nurse per protocol to submit Eliquis hold:   he patient is currently under an external PCP Dr. Flavio Cohen care and requires a blood thinner Eliquis (apixaban) for their upcoming scheduled Colonoscopy on 7/11/23.       External provider information:    Physician name: Dr. Flavio Cohen  Facility/Location: [facility]  Phone number: # [phone]    Notes: Eliquis - Hx blood clots        Spoke to Pt to schedule procedure(s) Colonoscopy       Physician to perform procedure(s) Dr. VASQUEZ Poe  Date of Procedure (s) 7/11/23  Arrival Time 7:45 AM  Time of Procedure(s) 8:45 AM   Location of Procedure(s) [place] 2nd Floor  Type of Rx Prep sent to patient: Suprep  Instructions provided to patient via [facility]    Patient was informed on the following information and verbalized understanding. Screening questionnaire reviewed with patient and complete. If procedure requires anesthesia, a responsible adult needs to be present to accompany the patient home, patient cannot drive after receiving anesthesia. Appointment details are tentative, especially check-in time. Patient will receive a prep-op call 4 days prior to confirm check-in time for procedure. If applicable the patient should contact their pharmacy to verify Rx for procedure prep is ready for pick-up. Patient was advised to call the scheduling department at [phone] if pharmacy states no Rx is available. Patient was advised to call the endoscopy scheduling department if any questions or concerns arise.      [facility] Scheduling Department

## 2023-06-26 ENCOUNTER — TELEPHONE (OUTPATIENT)
Dept: ENDOSCOPY | Facility: HOSPITAL | Age: 48
End: 2023-06-26
Payer: MEDICAID

## 2023-06-26 ENCOUNTER — PATIENT MESSAGE (OUTPATIENT)
Dept: ENDOSCOPY | Facility: HOSPITAL | Age: 48
End: 2023-06-26
Payer: MEDICAID

## 2023-06-26 NOTE — TELEPHONE ENCOUNTER
----- Message from Anne Nelson RN sent at 2023  3:17 PM CDT -----  Regardin/11 BT  The patient is currently under an external PCP Dr. Flavio Cohen care and requires a blood thinner Eliquis (apixaban) for their upcoming scheduled Colonoscopy on 23.       External provider information:    Physician name: Dr. Flavio Cohen  Facility/Location: [facility]  Phone number: # [phone]    Notes: Eliquis - Hx blood clots

## 2023-07-10 ENCOUNTER — ANESTHESIA EVENT (OUTPATIENT)
Dept: ENDOSCOPY | Facility: HOSPITAL | Age: 48
End: 2023-07-10
Payer: MEDICAID

## 2023-07-10 NOTE — ANESTHESIA PREPROCEDURE EVALUATION
07/10/2023  Cristina Sprague is a 47 y.o., female.  [facility]  Anesthesia Pre-Operative Evaluation       Patient Name: Cristina Sprague  YOB: 1975  MRN: [id number]  CSN: [id number]      Code Status: Prior   Date of Procedure: 7/11/2023  Anesthesia: Choice Procedure: Procedure(s) (LRB):  COLONOSCOPY (N/A)  Pre-Operative Diagnosis: Colon cancer screening [Z12.11]  Proceduralist: Surgeon(s) and Role:     * Ryan Poe MD - Primary Nurse: (Unknown)      SUBJECTIVE:   Cristina Sprague is a 47 y.o. female who  has a past medical history of Ectopic pregnancy (2014), History of blood clots, Morbid obesity, MTHFR mutation, and Pulmonary embolism (2006)..     she has a current medication list which includes the following long-term medication(s): furosemide and venlafaxine.     ALLERGIES:   Review of patient's allergies indicates:  No Known Allergies  LDA:          Lines/Drains/Airways     None                Anesthesia Evaluation      Airway   Mallampati: III  Dental    (+) Intact    Pulmonary    Cardiovascular     Neuro/Psych    (+) neuromuscular disease, psychiatric history    GI/Hepatic/Renal    (+) GERD,     Endo/Other    (+) arthritis  Abdominal                 MEDICATIONS:     Antibiotics (From admission, onward)    None        VTE Risk Mitigation (From admission, onward)    None            No current facility-administered medications for this encounter.     Current Outpatient Medications   Medication Sig Dispense Refill    ELIQUIS 5 mg Tab Take 5 mg by mouth 2 (two) times daily.  11    esomeprazole magnesium (NEXIUM ORAL) Nexium Take No date recorded No form recorded No frequency recorded No route recorded No set duration recorded No set duration amount recorded active No dosage strength recorded No dosage strength units of measure recorded      ferrous sulfate (FEOSOL) 325  mg (65 mg iron) Tab tablet       furosemide (LASIX) 20 MG tablet Take 20 mg by mouth.      multivitamin capsule Take 1 capsule by mouth once daily.      OZEMPIC 0.25 mg or 0.5 mg (2 mg/3 mL) pen injector SMARTSI.2 Milligram(s) SUB-Q Once a Week      venlafaxine (EFFEXOR-XR) 150 MG Cp24 Take 1 capsule (150 mg total) by mouth once daily. 90 capsule 3          History:   There are no hospital problems to display for this patient.    Surgical History:    has a past surgical history that includes Cholecystectomy;  section; Tubal ligation; Bariatric Surgery (2020); edil filter placement (); Lumbar fusion (); Knee Arthroplasty (Right, 2021); Salpingoophorectomy (Right, 2022); Salpingectomy (Left, 2022); Robot-assisted laparoscopic lysis of adhesions using da Jean Paul Xi (2022); Laparoscopic appendectomy (N/A, 2022); and Robot-assisted laparoscopic abdominal hysterectomy using da Jean Paul Xi (2022).   Social History:    reports that she is not currently sexually active and has had partner(s) who are male. She reports using the following method of birth control/protection: Surgical.  reports that she has never smoked. She has never used smokeless tobacco. She reports that she does not drink alcohol and does not use drugs.     OBJECTIVE:     Vital Signs (Most Recent):    Vital Signs Range (Last 24H):          There is no height or weight on file to calculate BMI.   Wt Readings from Last 4 Encounters:   23 98 kg (216 lb)   23 98.4 kg (216 lb 14.9 oz)   23 95.1 kg (209 lb 10.5 oz)   23 97.2 kg (214 lb 4.6 oz)       Significant Labs:  Lab Results   Component Value Date    WBC 5.83 2022    HGB 10.6 (L) 2022    HCT 34.1 (L) 2022     2022     2022    K 4.3 2022     2022    CREATININE 0.7 2022    BUN 19 2022    CO2 22 (L) 2022    GLU 81 2022    CALCIUM 9.4  04/06/2022    ALKPHOS 46 (L) 04/06/2022    ALT 11 04/06/2022    AST 13 04/06/2022    ALBUMIN 3.9 04/06/2022    HCGQUANT 1.9 04/08/2014     Patient's last menstrual period was 02/11/2022.  No results found for this or any previous visit (from the past 72 hour(s)).    EKG:   Results for orders placed or performed in visit on 04/08/22   EKG 12-lead    Collection Time: 04/08/22  8:28 AM    Narrative    Test Reason : N94.89,    Vent. Rate : 050 BPM     Atrial Rate : 050 BPM     P-R Int : 158 ms          QRS Dur : 132 ms      QT Int : 438 ms       P-R-T Axes : 075 102 070 degrees     QTc Int : 399 ms    Sinus bradycardia with sinus arrhythmia  Right bundle branch block  Abnormal ECG  When compared with ECG of 03-NOV-2021 13:18,  No significant change was found  Confirmed by Zhane Robert MD ([id number]) on 4/8/2022 9:02:47 AM    Referred By: CADE MORRISSEY           Confirmed By:Zhane Robert MD       TTE:  No results found for this or any previous visit.  No results found for: EF   No results found for this or any previous visit.  MYA:  No results found for this or any previous visit.  Stress Test:  No results found for this or any previous visit.     LHC:  No results found for this or any previous visit.     PFT:  No results found for: FEV1, FVC, VDE6CHC, TLC, DLCO     ASSESSMENT/PLAN:       Pre-op Assessment    I have reviewed the Patient Summary Reports.     I have reviewed the Nursing Notes. I have reviewed the NPO Status.   I have reviewed the Medications.     Review of Systems  Anesthesia Hx:  No problems with previous Anesthesia  Denies Family Hx of Anesthesia complications.   Denies Personal Hx of Anesthesia complications.   Hematology/Oncology:  Hematology Normal   Oncology Normal     EENT/Dental:EENT/Dental Normal   Cardiovascular:  Cardiovascular Normal     Pulmonary:  Pulmonary Normal    Renal/:  Renal/ Normal     Hepatic/GI:   GERD    Musculoskeletal:   Arthritis     Neurological:   Neuromuscular Disease,     Endocrine:  Endocrine Normal    Dermatological:  Skin Normal    Psych:   Psychiatric History          Physical Exam  General: Well nourished, Alert and Oriented    Airway:  Mallampati: III       Dental:  Intact        Anesthesia Plan  Type of Anesthesia, risks & benefits discussed:    Anesthesia Type: Gen Natural Airway  Intra-op Monitoring Plan: Standard ASA Monitors  Post Op Pain Control Plan: multimodal analgesia  Induction:  IV  Informed Consent: Informed consent signed with the Patient and all parties understand the risks and agree with anesthesia plan.  All questions answered. Patient consented to blood products? No  ASA Score: 2  Day of Surgery Review of History & Physical: H&P Update referred to the surgeon/provider.    Ready For Surgery From Anesthesia Perspective.     .

## 2023-07-11 ENCOUNTER — ANESTHESIA (OUTPATIENT)
Dept: ENDOSCOPY | Facility: HOSPITAL | Age: 48
End: 2023-07-11
Payer: MEDICAID

## 2023-07-11 ENCOUNTER — HOSPITAL ENCOUNTER (OUTPATIENT)
Facility: HOSPITAL | Age: 48
Discharge: HOME | End: 2023-07-11
Attending: STUDENT IN AN ORGANIZED HEALTH CARE EDUCATION/TRAINING PROGRAM | Admitting: STUDENT IN AN ORGANIZED HEALTH CARE EDUCATION/TRAINING PROGRAM
Payer: MEDICAID

## 2023-07-11 VITALS
DIASTOLIC BLOOD PRESSURE: 67 MMHG | BODY MASS INDEX: 34.82 KG/M2 | SYSTOLIC BLOOD PRESSURE: 106 MMHG | WEIGHT: 209 LBS | TEMPERATURE: 98 F | OXYGEN SATURATION: 100 % | RESPIRATION RATE: 16 BRPM | HEART RATE: 67 BPM | HEIGHT: 65 IN

## 2023-07-11 DIAGNOSIS — Z12.11 COLON CANCER SCREENING: Primary | ICD-10-CM

## 2023-07-11 PROCEDURE — 94761 N-INVAS EAR/PLS OXIMETRY MLT: CPT

## 2023-07-11 PROCEDURE — 88305 TISSUE EXAM BY PATHOLOGIST: ICD-10-PCS | Mod: 26,,, | Performed by: PATHOLOGY

## 2023-07-11 PROCEDURE — D9220A PRA ANESTHESIA: ICD-10-PCS | Mod: ,,, | Performed by: NURSE ANESTHETIST, CERTIFIED REGISTERED

## 2023-07-11 PROCEDURE — 45380 COLONOSCOPY AND BIOPSY: CPT | Mod: ,,, | Performed by: STUDENT IN AN ORGANIZED HEALTH CARE EDUCATION/TRAINING PROGRAM

## 2023-07-11 PROCEDURE — 45380 PR COLONOSCOPY,BIOPSY: ICD-10-PCS | Mod: ,,, | Performed by: STUDENT IN AN ORGANIZED HEALTH CARE EDUCATION/TRAINING PROGRAM

## 2023-07-11 PROCEDURE — 88305 TISSUE EXAM BY PATHOLOGIST: CPT | Performed by: PATHOLOGY

## 2023-07-11 PROCEDURE — 37000008 HC ANESTHESIA 1ST 15 MINUTES: Performed by: STUDENT IN AN ORGANIZED HEALTH CARE EDUCATION/TRAINING PROGRAM

## 2023-07-11 PROCEDURE — 88305 TISSUE EXAM BY PATHOLOGIST: CPT | Mod: 26,,, | Performed by: PATHOLOGY

## 2023-07-11 PROCEDURE — 45380 COLONOSCOPY AND BIOPSY: CPT | Performed by: STUDENT IN AN ORGANIZED HEALTH CARE EDUCATION/TRAINING PROGRAM

## 2023-07-11 PROCEDURE — 37000009 HC ANESTHESIA EA ADD 15 MINS: Performed by: STUDENT IN AN ORGANIZED HEALTH CARE EDUCATION/TRAINING PROGRAM

## 2023-07-11 PROCEDURE — 25000003 PHARM REV CODE 250: Performed by: NURSE ANESTHETIST, CERTIFIED REGISTERED

## 2023-07-11 PROCEDURE — 99900035 HC TECH TIME PER 15 MIN (STAT)

## 2023-07-11 PROCEDURE — D9220A PRA ANESTHESIA: Mod: ,,, | Performed by: NURSE ANESTHETIST, CERTIFIED REGISTERED

## 2023-07-11 PROCEDURE — 00811 ANES LWR INTST NDSC NOS: CPT | Performed by: STUDENT IN AN ORGANIZED HEALTH CARE EDUCATION/TRAINING PROGRAM

## 2023-07-11 PROCEDURE — 27201012 HC FORCEPS, HOT/COLD, DISP: Performed by: STUDENT IN AN ORGANIZED HEALTH CARE EDUCATION/TRAINING PROGRAM

## 2023-07-11 RX ORDER — LIDOCAINE HYDROCHLORIDE 20 MG/ML
INJECTION INTRAVENOUS
Status: DISCONTINUED | OUTPATIENT
Start: 2023-07-11 | End: 2023-07-11

## 2023-07-11 RX ORDER — SODIUM CHLORIDE 9 MG/ML
INJECTION, SOLUTION INTRAVENOUS CONTINUOUS
Status: DISCONTINUED | OUTPATIENT
Start: 2023-07-11 | End: 2023-07-11 | Stop reason: HOSPADM

## 2023-07-11 RX ORDER — PROPOFOL 10 MG/ML
VIAL (ML) INTRAVENOUS
Status: DISCONTINUED | OUTPATIENT
Start: 2023-07-11 | End: 2023-07-11

## 2023-07-11 RX ADMIN — Medication 70 MG: at 08:07

## 2023-07-11 RX ADMIN — LIDOCAINE HYDROCHLORIDE 40 MG: 20 INJECTION INTRAVENOUS at 08:07

## 2023-07-11 RX ADMIN — SODIUM CHLORIDE: 0.9 INJECTION, SOLUTION INTRAVENOUS at 08:07

## 2023-07-11 RX ADMIN — Medication 150 MCG/KG/MIN: at 08:07

## 2023-07-11 NOTE — TRANSFER OF CARE
"Anesthesia Transfer of Care Note    Patient: Cristina Laceyphill    Procedure(s) Performed: Procedure(s) (LRB):  COLONOSCOPY (N/A)    Patient location: PACU    Anesthesia Type: general    Transport from OR: Transported from OR on 6-10 L/min O2 by face mask with adequate spontaneous ventilation    Post pain: adequate analgesia    Post assessment: no apparent anesthetic complications    Post vital signs: stable    Level of consciousness: awake    Nausea/Vomiting: no nausea/vomiting    Complications: none    Transfer of care protocol was followed      Last vitals:   Visit Vitals  BP 98/62 (BP Location: Left arm, Patient Position: Lying)   Pulse 70   Temp 36.4 °C (97.5 °F) (Temporal)   Resp 16   Ht 5' 5" (1.651 m)   Wt 94.8 kg (209 lb)   LMP 02/11/2022   SpO2 100%   Breastfeeding No   BMI 34.78 kg/m²     "

## 2023-07-11 NOTE — H&P
Short Stay Endoscopy History and Physical    PCP - MIKE Grubbs MD  Referring Physician - Josh Roach MD  [address] [place] [address]    Procedure - Colonoscopy  ASA - per anesthesia  Mallampati - per anesthesia  History of Anesthesia problems - no  Family history Anesthesia problems -  no   Plan of anesthesia - General    HPI  47 y.o. female  Reason for procedure:   Colon cancer screening [Z12.11]        ROS:  Constitutional: No fevers, chills, No weight loss  CV: No chest pain  Pulm: No cough, No shortness of breath  GI: see HPI    Medical History:  has a past medical history of Ectopic pregnancy (), History of blood clots, Morbid obesity, MTHFR mutation, and Pulmonary embolism ().    Surgical History:  has a past surgical history that includes Cholecystectomy;  section; Tubal ligation; Bariatric Surgery (2020); edil filter placement (); Lumbar fusion (); Knee Arthroplasty (Right, 2021); Salpingoophorectomy (Right, 2022); Salpingectomy (Left, 2022); Robot-assisted laparoscopic lysis of adhesions using da Jean Paul Xi (2022); Laparoscopic appendectomy (N/A, 2022); and Robot-assisted laparoscopic abdominal hysterectomy using da Jean Paul Xi (2022).    Family History: family history includes Hypertension in her father and mother; Miscarriages / Stillbirths in her mother..    Social History:  reports that she has never smoked. She has never used smokeless tobacco. She reports that she does not drink alcohol and does not use drugs.    Review of patient's allergies indicates:  No Known Allergies    Medications:   Medications Prior to Admission   Medication Sig Dispense Refill Last Dose    esomeprazole magnesium (NEXIUM ORAL) Nexium Take No date recorded No form recorded No frequency recorded No route recorded No set duration recorded No set duration amount recorded active No dosage strength recorded No dosage strength units of measure  recorded   7/10/2023    furosemide (LASIX) 20 MG tablet Take 20 mg by mouth.   Past Month    multivitamin capsule Take 1 capsule by mouth once daily.   7/10/2023    venlafaxine (EFFEXOR-XR) 150 MG Cp24 Take 1 capsule (150 mg total) by mouth once daily. 90 capsule 3 7/10/2023    ELIQUIS 5 mg Tab Take 5 mg by mouth 2 (two) times daily.  11 2023    ferrous sulfate (FEOSOL) 325 mg (65 mg iron) Tab tablet    2023    OZEMPIC 0.25 mg or 0.5 mg (2 mg/3 mL) pen injector SMARTSI.2 Milligram(s) SUB-Q Once a Week   2023       Physical Exam:    Vital Signs:   Vitals:    23 0748   BP: 110/67   Pulse: 76   Resp: 16   Temp: 97.9 °F (36.6 °C)       General Appearance: Well appearing in no acute distress  Abdomen: Soft, non tender, non distended with normal bowel sounds, no masses    Labs:  Lab Results   Component Value Date    WBC 5.83 2022    HGB 10.6 (L) 2022    HCT 34.1 (L) 2022     2022    ALT 11 2022    AST 13 2022     2022    K 4.3 2022     2022    CREATININE 0.7 2022    BUN 19 2022    CO2 22 (L) 2022    TSH 0.937 2021       I have explained the risks and benefits of this endoscopic procedure to the patient including but not limited to bleeding, inflammation, infection, perforation, and death.      Ryan Poe MD

## 2023-07-11 NOTE — PROVATION PATIENT INSTRUCTIONS
Discharge Summary/Instructions after an Endoscopic Procedure  Patient Name: Cristina Sprague  Patient MRN: [id number]  Patient YOB: 1975 Tuesday, July 11, 2023  Ryan Poe MD  Dear patient,  As a result of recent federal legislation (The Federal Cures Act), you may   receive lab or pathology results from your procedure in your [facility]   account before your physician is able to contact you. Your physician or   their representative will relay the results to you with their   recommendations at their soonest availability.  Thank you,  RESTRICTIONS:  During your procedure today, you received medications for sedation.  These   medications may affect your judgment, balance and coordination.  Therefore,   for 24 hours, you have the following restrictions:   - DO NOT drive a car, operate machinery, make legal/financial decisions,   sign important papers or drink alcohol.    ACTIVITY:  Today: no heavy lifting, straining or running due to procedural   sedation/anesthesia.  The following day: return to full activity including work.  DIET:  Eat and drink normally unless instructed otherwise.     TREATMENT FOR COMMON SIDE EFFECTS:  - Mild abdominal pain, nausea, belching, bloating or excessive gas:  rest,   eat lightly and use a heating pad.  - Sore Throat: treat with throat lozenges and/or gargle with warm salt   water.  - Because air was used during the procedure, expelling large amounts of air   from your rectum or belching is normal.  - If a bowel prep was taken, you may not have a bowel movement for 1-3 days.    This is normal.  SYMPTOMS TO WATCH FOR AND REPORT TO YOUR PHYSICIAN:  1. Abdominal pain or bloating, other than gas cramps.  2. Chest pain.  3. Back pain.  4. Signs of infection such as: chills or fever occurring within 24 hours   after the procedure.  5. Rectal bleeding, which would show as bright red, maroon, or black stools.   (A tablespoon of blood from the rectum is not serious, especially  if   hemorrhoids are present.)  6. Vomiting.  7. Weakness or dizziness.  GO DIRECTLY TO THE NEAREST EMERGENCY ROOM IF YOU HAVE ANY OF THE FOLLOWING:      Difficulty breathing              Chills and/or fever over 101 F   Persistent vomiting and/or vomiting blood   Severe abdominal pain   Severe chest pain   Black, tarry stools   Bleeding- more than one tablespoon   Any other symptom or condition that you feel may need urgent attention  Your doctor recommends these additional instructions:  If any biopsies were taken, your doctors clinic will contact you in 1 to 2   weeks with any results.  - Discharge patient to home (ambulatory).   - Patient has a contact number available for emergencies.  The signs and   symptoms of potential delayed complications were discussed with the   patient.  Return to normal activities tomorrow.  Written discharge   instructions were provided to the patient.   - Resume previous diet.   - Continue present medications.   - Return to primary care physician as previously scheduled.   - Repeat colonoscopy in 7 years for surveillance.  For questions, problems or results please call your physician - Ryan Poe MD at Work:  [phone].  [facility], EMERGENCY ROOM PHONE NUMBER: [phone]  IF A COMPLICATION OR EMERGENCY SITUATION ARISES AND YOU ARE UNABLE TO REACH   YOUR PHYSICIAN - GO DIRECTLY TO THE EMERGENCY ROOM.  Ryan Poe MD  7/11/2023 9:03:46 AM  This report has been verified and signed electronically.  Dear patient,  As a result of recent federal legislation (The Federal Cures Act), you may   receive lab or pathology results from your procedure in your [facility]   account before your physician is able to contact you. Your physician or   their representative will relay the results to you with their   recommendations at their soonest availability.  Thank you,  PROVATION

## 2023-07-12 LAB
FINAL PATHOLOGIC DIAGNOSIS: NORMAL
GROSS: NORMAL
Lab: NORMAL

## 2023-07-17 ENCOUNTER — TELEPHONE (OUTPATIENT)
Dept: ENDOSCOPY | Facility: HOSPITAL | Age: 48
End: 2023-07-17
Payer: MEDICAID

## 2023-11-10 ENCOUNTER — PATIENT MESSAGE (OUTPATIENT)
Dept: ORTHOPEDICS | Facility: CLINIC | Age: 48
End: 2023-11-10
Payer: MEDICAID

## 2024-01-16 DIAGNOSIS — N95.1 VASOMOTOR SYMPTOMS DUE TO MENOPAUSE: ICD-10-CM

## 2024-01-16 NOTE — TELEPHONE ENCOUNTER
Refill Routing Note   Medication(s) are not appropriate for processing by [facility] for the following reason(s):      Required labs outdated    [facility] action(s):  Defer Care Due:  None identified            Appointments  past 12m or future 3m with PCP    Date Provider   Last Visit   5/17/2023 Dlema Roach MD   Next Visit   Visit date not found Delma Roach MD   ED visits in past 90 days: 0        Note composed:4:19 PM 01/16/2024

## 2024-01-17 RX ORDER — VENLAFAXINE HYDROCHLORIDE 150 MG/1
150 CAPSULE, EXTENDED RELEASE ORAL DAILY
Qty: 90 CAPSULE | Refills: 0 | Status: SHIPPED | OUTPATIENT
Start: 2024-01-17 | End: 2024-04-16

## 2024-04-13 DIAGNOSIS — N95.1 VASOMOTOR SYMPTOMS DUE TO MENOPAUSE: ICD-10-CM

## 2024-04-14 NOTE — TELEPHONE ENCOUNTER
Refill Routing Note   Medication(s) are not appropriate for processing by [facility] for the following reason(s):        Required labs outdated    [facility] action(s):  Defer        Medication Therapy Plan: outdated labs noted on last encounter as well, no current labs found.      Appointments  past 12m or future 3m with PCP    Date Provider   Last Visit   5/17/2023 Delma Roach MD   Next Visit   Visit date not found Delma Roach MD   ED visits in past 90 days: 0        Note composed:11:54 AM 04/14/2024

## 2024-04-16 ENCOUNTER — TELEPHONE (OUTPATIENT)
Dept: OBSTETRICS AND GYNECOLOGY | Facility: CLINIC | Age: 49
End: 2024-04-16
Payer: COMMERCIAL

## 2024-04-16 RX ORDER — VENLAFAXINE HYDROCHLORIDE 150 MG/1
150 CAPSULE, EXTENDED RELEASE ORAL DAILY
Qty: 90 CAPSULE | Refills: 0 | Status: SHIPPED | OUTPATIENT
Start: 2024-04-16

## 2024-04-18 ENCOUNTER — HOSPITAL ENCOUNTER (OUTPATIENT)
Dept: RADIOLOGY | Facility: HOSPITAL | Age: 49
Discharge: HOME | End: 2024-04-18
Attending: OBSTETRICS & GYNECOLOGY
Payer: COMMERCIAL

## 2024-04-18 VITALS — WEIGHT: 209 LBS | HEIGHT: 65 IN | BODY MASS INDEX: 34.82 KG/M2

## 2024-04-18 DIAGNOSIS — Z12.31 BREAST CANCER SCREENING BY MAMMOGRAM: ICD-10-CM

## 2024-04-18 PROCEDURE — 77067 SCR MAMMO BI INCL CAD: CPT | Mod: 26,,, | Performed by: RADIOLOGY

## 2024-04-18 PROCEDURE — 77063 BREAST TOMOSYNTHESIS BI: CPT | Mod: TC

## 2024-04-18 PROCEDURE — 77063 BREAST TOMOSYNTHESIS BI: CPT | Mod: 26,,, | Performed by: RADIOLOGY

## 2024-06-30 ENCOUNTER — OFFICE VISIT (OUTPATIENT)
Dept: URGENT CARE | Facility: CLINIC | Age: 49
End: 2024-06-30
Payer: COMMERCIAL

## 2024-06-30 VITALS
RESPIRATION RATE: 17 BRPM | WEIGHT: 209 LBS | TEMPERATURE: 98 F | BODY MASS INDEX: 34.82 KG/M2 | HEART RATE: 78 BPM | OXYGEN SATURATION: 98 % | HEIGHT: 65 IN | DIASTOLIC BLOOD PRESSURE: 79 MMHG | SYSTOLIC BLOOD PRESSURE: 113 MMHG

## 2024-06-30 DIAGNOSIS — L23.7 ALLERGIC CONTACT DERMATITIS DUE TO PLANTS, EXCEPT FOOD: Primary | ICD-10-CM

## 2024-06-30 DIAGNOSIS — L50.9 URTICARIA: ICD-10-CM

## 2024-06-30 DIAGNOSIS — L29.9 PRURITUS: ICD-10-CM

## 2024-06-30 DIAGNOSIS — J02.9 SORE THROAT: ICD-10-CM

## 2024-06-30 DIAGNOSIS — R05.1 ACUTE COUGH: ICD-10-CM

## 2024-06-30 PROCEDURE — 96372 THER/PROPH/DIAG INJ SC/IM: CPT | Mod: S$GLB,,, | Performed by: FAMILY MEDICINE

## 2024-06-30 PROCEDURE — 99203 OFFICE O/P NEW LOW 30 MIN: CPT | Mod: 25,S$GLB,,

## 2024-06-30 RX ORDER — PREDNISONE 20 MG/1
40 TABLET ORAL DAILY
Qty: 8 TABLET | Refills: 0 | Status: SHIPPED | OUTPATIENT
Start: 2024-06-30 | End: 2024-07-04

## 2024-06-30 RX ORDER — CETIRIZINE HYDROCHLORIDE 10 MG/1
10 TABLET ORAL DAILY
Qty: 14 TABLET | Refills: 0 | Status: SHIPPED | OUTPATIENT
Start: 2024-06-30 | End: 2024-07-14

## 2024-06-30 RX ORDER — HYDROCORTISONE 1 %
CREAM (GRAM) TOPICAL 2 TIMES DAILY
Qty: 20 G | Refills: 1 | Status: SHIPPED | OUTPATIENT
Start: 2024-06-30 | End: 2024-07-14

## 2024-06-30 RX ORDER — DEXAMETHASONE SODIUM PHOSPHATE 100 MG/10ML
10 INJECTION INTRAMUSCULAR; INTRAVENOUS
Status: COMPLETED | OUTPATIENT
Start: 2024-06-30 | End: 2024-06-30

## 2024-06-30 RX ADMIN — DEXAMETHASONE SODIUM PHOSPHATE 10 MG: 100 INJECTION INTRAMUSCULAR; INTRAVENOUS at 11:06

## 2024-06-30 NOTE — PROGRESS NOTES
"Subjective:      Patient ID: Cristina Sprague is a 48 y.o. female.    Vitals:  height is 5' 5" (1.651 m) and weight is 94.8 kg (208 lb 15.9 oz). Her oral temperature is 98.3 °F (36.8 °C). Her blood pressure is 113/79 and her pulse is 78. Her respiration is 17 and oxygen saturation is 98%.     Chief Complaint: Rash (Poison ivy for 2 weeks and nothing OTC is working and I'm miserable - Entered by patient)    Pt coming into clinic with Rash - possible Poison ivy for 2 weeks, treatment includes otc medication with no relief. Pt also states she is having sore throat, congestion that started Wednesday.    Provider note    47 yo F c/o itchy rash to bilateral arms, legs, and torso that started 2-3 weeks ago after pulling weeds in the yard. States she thinks she was exposed to poison ivy. She has applied benadryl spray and anti itch cream without relief. The rash started on the left arm and then spread elsewhere. Denies lip swelling, sob, tongue swelling. She also has had a cough since Wednesday of this past week and a sore throat. Denies fever, congestion, bodyaches. NVD. Not taking anything for these symptoms.    Rash  This is a new problem. The current episode started 1 to 4 weeks ago. The problem is unchanged. Location: both arms. The rash is characterized by itchiness. She was exposed to nothing. Associated symptoms include coughing and a sore throat. Pertinent negatives include no anorexia, congestion, diarrhea, eye pain, facial edema, fatigue, fever, joint pain, nail changes, rhinorrhea, shortness of breath or vomiting. The treatment provided no relief. There is no history of allergies, asthma, eczema or varicella.     Constitution: Negative for chills, sweating, fatigue and fever.   HENT:  Positive for sore throat. Negative for congestion, sinus pain, sinus pressure and trouble swallowing.    Eyes:  Negative for eye pain.   Respiratory:  Positive for cough. Negative for shortness of breath.    Gastrointestinal:  " Negative for nausea, vomiting, constipation and diarrhea.   Musculoskeletal:  Negative for muscle ache.   Skin:  Positive for rash and hives.   Allergic/Immunologic: Positive for hives and itching.      Objective:     Physical Exam   Constitutional: She is oriented to person, place, and time.   HENT:   Head: Normocephalic and atraumatic.   Ears:   Right Ear: Tympanic membrane, external ear and ear canal normal.   Left Ear: Tympanic membrane, external ear and ear canal normal.   Nose: No rhinorrhea or congestion.   Mouth/Throat: No oropharyngeal exudate or posterior oropharyngeal erythema.   Eyes: Conjunctivae are normal. Pupils are equal, round, and reactive to light. Extraocular movement intact   Cardiovascular: Normal rate, regular rhythm and normal heart sounds.   Pulmonary/Chest: Effort normal and breath sounds normal.   Abdominal: Normal appearance.   Musculoskeletal: Normal range of motion.         General: Normal range of motion.   Neurological: She is alert and oriented to person, place, and time.   Skin: Skin is warm, dry and rash.         Comments: Erythematous urticarial wheals noted to left forearm, right upper arm, and torso       Assessment:     1. Allergic contact dermatitis due to plants, except food    2. Urticaria    3. Pruritus    4. Acute cough    5. Sore throat        Plan:       Allergic contact dermatitis due to plants, except food  -     dexAMETHasone injection 10 mg  -     predniSONE (DELTASONE) 20 MG tablet; Take 2 tablets (40 mg total) by mouth once daily. for 4 days  Dispense: 8 tablet; Refill: 0    Urticaria  -     predniSONE (DELTASONE) 20 MG tablet; Take 2 tablets (40 mg total) by mouth once daily. for 4 days  Dispense: 8 tablet; Refill: 0  -     cetirizine (ZYRTEC) 10 MG tablet; Take 1 tablet (10 mg total) by mouth once daily. for 14 days  Dispense: 14 tablet; Refill: 0    Pruritus  -     predniSONE (DELTASONE) 20 MG tablet; Take 2 tablets (40 mg total) by mouth once daily. for 4 days   Dispense: 8 tablet; Refill: 0  -     cetirizine (ZYRTEC) 10 MG tablet; Take 1 tablet (10 mg total) by mouth once daily. for 14 days  Dispense: 14 tablet; Refill: 0  -     hydrocortisone 1 % cream; Apply topically 2 (two) times daily. for 14 days  Dispense: 20 g; Refill: 1    Acute cough    Sore throat        Patient Instructions   Please return here or go to the Emergency Department for any concerns or worsening of condition (vision loss, worsening rash, lethargy, difficulty swallowing/breathing, shortness of breath, passing out).        -You should use Benadryl every 8-12 hours.  Be careful with this medication, as it may cause drowsiness.    -Please also take over the counter Pepcid/zantac every 12 hours as directed for the next 24-72 hours to help with your allergic reaction.    -Please add an over the counter antihistamine medication (Claritin/Zyrtec) every 12 hours  of your choice as directed daily to help with your allergic reaction. May taper meds to off when symptoms improve.      USE CAUTION WITH ANTIHISTAMINE COMBINATION AS IT MAY CAUSE DROWSINESS AS WELL AS DEHYDRATION OR CONSTIPATION. May add hydroxyzine at night for severe itching to help with sleep.      If you have a localized reaction, it is ok to apply over the counter anti-itch topical creams as directed to the affected area.  Do not apply steroid cream on your face.      -Please call [facility] scheduling [facility] set up appointment for PCP/specialty clinic for continued workup and management.       Please arrange follow up with your primary medical clinic as soon as possible.      ED Precautions   If your symptoms worsen or fail to improve you should go to Emergency Department.

## 2024-06-30 NOTE — PATIENT INSTRUCTIONS
Please return here or go to the Emergency Department for any concerns or worsening of condition (vision loss, worsening rash, lethargy, difficulty swallowing/breathing, shortness of breath, passing out).        -You should use Benadryl every 8-12 hours.  Be careful with this medication, as it may cause drowsiness.    -Please also take over the counter Pepcid/zantac every 12 hours as directed for the next 24-72 hours to help with your allergic reaction.    -Please add an over the counter antihistamine medication (Claritin/Zyrtec) every 12 hours  of your choice as directed daily to help with your allergic reaction. May taper meds to off when symptoms improve.      USE CAUTION WITH ANTIHISTAMINE COMBINATION AS IT MAY CAUSE DROWSINESS AS WELL AS DEHYDRATION OR CONSTIPATION. May add hydroxyzine at night for severe itching to help with sleep.      If you have a localized reaction, it is ok to apply over the counter anti-itch topical creams as directed to the affected area.  Do not apply steroid cream on your face.      -Please call [facility] set up appointment for PCP/specialty clinic for continued workup and management.       Please arrange follow up with your primary medical clinic as soon as possible.      ED Precautions   If your symptoms worsen or fail to improve you should go to Emergency Department.

## 2024-08-10 DIAGNOSIS — N95.1 VASOMOTOR SYMPTOMS DUE TO MENOPAUSE: ICD-10-CM

## 2024-08-10 NOTE — TELEPHONE ENCOUNTER
Refill Routing Note   Medication(s) are not appropriate for processing by [facility] for the following reason(s):        Patient not seen by provider within 15 months    [facility] action(s):  Defer               Appointments  past 12m or future 3m with PCP    Date Provider   Last Visit   5/17/2023 Delma Roach MD   Next Visit   Visit date not found Delma Roach MD   ED visits in past 90 days: 0        Note composed:8:03 AM 08/10/2024

## 2024-08-13 RX ORDER — VENLAFAXINE HYDROCHLORIDE 150 MG/1
150 CAPSULE, EXTENDED RELEASE ORAL DAILY
Qty: 90 CAPSULE | Refills: 0 | Status: SHIPPED | OUTPATIENT
Start: 2024-08-13

## 2024-08-15 ENCOUNTER — TELEPHONE (OUTPATIENT)
Dept: OBSTETRICS AND GYNECOLOGY | Facility: CLINIC | Age: 49
End: 2024-08-15
Payer: COMMERCIAL

## 2024-08-20 ENCOUNTER — TELEPHONE (OUTPATIENT)
Dept: OBSTETRICS AND GYNECOLOGY | Facility: CLINIC | Age: 49
End: 2024-08-20
Payer: COMMERCIAL

## 2024-09-12 ENCOUNTER — OFFICE VISIT (OUTPATIENT)
Dept: URGENT CARE | Facility: CLINIC | Age: 49
End: 2024-09-12
Payer: COMMERCIAL

## 2024-09-12 VITALS
HEIGHT: 65 IN | SYSTOLIC BLOOD PRESSURE: 120 MMHG | BODY MASS INDEX: 37.99 KG/M2 | TEMPERATURE: 98 F | RESPIRATION RATE: 16 BRPM | DIASTOLIC BLOOD PRESSURE: 82 MMHG | HEART RATE: 74 BPM | OXYGEN SATURATION: 98 % | WEIGHT: 228 LBS

## 2024-09-12 DIAGNOSIS — L25.5 PLANT DERMATITIS: Primary | ICD-10-CM

## 2024-09-12 RX ORDER — HYDROXYZINE HYDROCHLORIDE 25 MG/1
25 TABLET, FILM COATED ORAL EVERY 8 HOURS PRN
Qty: 30 TABLET | Refills: 0 | Status: SHIPPED | OUTPATIENT
Start: 2024-09-12

## 2024-09-12 RX ORDER — CLOBETASOL PROPIONATE 0.5 MG/G
OINTMENT TOPICAL 2 TIMES DAILY
Qty: 60 G | Refills: 0 | Status: SHIPPED | OUTPATIENT
Start: 2024-09-12 | End: 2024-09-19

## 2024-09-12 NOTE — PROGRESS NOTES
"Subjective:      Patient ID: Cristina Sprague is a 49 y.o. female.    Vitals:  height is 5' 5" (1.651 m) and weight is 103.4 kg (228 lb). Her oral temperature is 98.4 °F (36.9 °C). Her blood pressure is 120/82 and her pulse is 74. Her respiration is 16 and oxygen saturation is 98%.     Chief Complaint: Rash    Pt states 4-5 days ago she was pulling weeds & then noticed a rash develop on her right hand. Rash has then spread to the right side of her face & left arm.     Rash  This is a new problem. The current episode started in the past 7 days. The problem has been gradually worsening since onset. The affected locations include the right hand, face and left arm. The rash is characterized by redness, itchiness, pain, blistering and burning. She was exposed to plant contact. Pertinent negatives include no fever. Past treatments include anti-itch cream and oral steroids. The treatment provided no relief.       Constitution: Negative for fever.   Skin:  Positive for rash.   Allergic/Immunologic: Positive for itching.      Objective:     Physical Exam   Constitutional: She is oriented to person, place, and time.  Non-toxic appearance. She does not appear ill. No distress.   HENT:   Head: Normocephalic.   Nose: Nose normal.   Mouth/Throat: Mucous membranes are moist.   Cardiovascular: Normal rate.   Pulmonary/Chest: Effort normal.   Abdominal: Normal appearance.   Musculoskeletal: Normal range of motion.         General: Normal range of motion.   Neurological: She is alert and oriented to person, place, and time.   Skin: Skin is warm, dry, not diaphoretic and rash.         Comments: Patchy, erythematous rash to right side of face. Linear vesicular rash to arms (looks like poison ivy not scabies)   Psychiatric: Her behavior is normal. Mood normal.   Nursing note and vitals reviewed.      Assessment:     1. Plant dermatitis        Plan:   Avoid prescription steroid cream on the face.   Continue benadryl cream and aveno to the " face  Avoid scratching or touching area  Good handwashing  Avoid heat/humidity-which can worsen rash  Take cooler than normal showers  Use gentle soaps and body products   Avoid any products with fragrance until rash resolves  Ice packs as needed   Rub with soft cloth if you get the desire to scratch      Plant dermatitis  -     hydrOXYzine HCL (ATARAX) 25 MG tablet; Take 1 tablet (25 mg total) by mouth every 8 (eight) hours as needed for Itching.  Dispense: 30 tablet; Refill: 0  -     clobetasol 0.05% (TEMOVATE) 0.05 % Oint; Apply topically 2 (two) times daily. for 7 days  Dispense: 60 g; Refill: 0

## 2024-09-13 ENCOUNTER — OFFICE VISIT (OUTPATIENT)
Dept: URGENT CARE | Facility: CLINIC | Age: 49
End: 2024-09-13
Payer: COMMERCIAL

## 2024-09-13 VITALS
OXYGEN SATURATION: 98 % | RESPIRATION RATE: 18 BRPM | WEIGHT: 225 LBS | BODY MASS INDEX: 37.49 KG/M2 | HEIGHT: 65 IN | SYSTOLIC BLOOD PRESSURE: 108 MMHG | TEMPERATURE: 99 F | HEART RATE: 66 BPM | DIASTOLIC BLOOD PRESSURE: 73 MMHG

## 2024-09-13 DIAGNOSIS — R22.0 FACIAL SWELLING: ICD-10-CM

## 2024-09-13 DIAGNOSIS — L25.5 PLANT DERMATITIS: ICD-10-CM

## 2024-09-13 DIAGNOSIS — L03.213 PERIORBITAL CELLULITIS OF RIGHT EYE: Primary | ICD-10-CM

## 2024-09-13 RX ORDER — SULFAMETHOXAZOLE AND TRIMETHOPRIM 800; 160 MG/1; MG/1
1 TABLET ORAL 2 TIMES DAILY
Qty: 14 TABLET | Refills: 0 | Status: SHIPPED | OUTPATIENT
Start: 2024-09-13 | End: 2024-09-20

## 2024-09-13 RX ORDER — DEXAMETHASONE SODIUM PHOSPHATE 10 MG/ML
10 INJECTION INTRAMUSCULAR; INTRAVENOUS
Status: COMPLETED | OUTPATIENT
Start: 2024-09-13 | End: 2024-09-13

## 2024-09-13 RX ADMIN — DEXAMETHASONE SODIUM PHOSPHATE 10 MG: 10 INJECTION INTRAMUSCULAR; INTRAVENOUS at 10:09

## 2024-09-13 NOTE — PATIENT INSTRUCTIONS
Follow up with any worsening symptoms     You received a steroid shot today.   Side effects of steroids include elevated blood pressure, elevated blood sugar, water weight gain,   nervous energy, redness to the face, and dimpling of the skin where shot was injected.

## 2024-09-13 NOTE — PROGRESS NOTES
"Subjective:      Patient ID: Cristina Sprague is a 49 y.o. female.    Vitals:  height is 5' 5" (1.651 m) and weight is 102.1 kg (225 lb). Her oral temperature is 98.8 °F (37.1 °C). Her blood pressure is 108/73 and her pulse is 66. Her respiration is 18 and oxygen saturation is 98%.     Chief Complaint: Rash    49 yr female presents to  today with c/o rash to face that is worsening.    Pt seen here yesterday, and treated for plant dermatis to face and body.   Today, she reports that the swelling and redness to her face has worsened, and the redness is spreading up to her right periorbital area.   Denies eye drainage. Denies fever.   She is here for steroid shot today, she states.     Rash  This is a new problem. The current episode started in the past 7 days. The problem has been gradually worsening since onset. The affected locations include the neck, chest, left hand, right hand and face. The rash is characterized by redness, itchiness, burning and blistering. Associated with: plant. Pertinent negatives include no fever. Treatments tried: prescrption medication. The treatment provided no relief.       Constitution: Negative for fever.   Skin:  Positive for rash, skin thickening/induration and erythema.   Allergic/Immunologic: Positive for itching.      Objective:     Physical Exam   Constitutional: She is oriented to person, place, and time.  Non-toxic appearance. She does not appear ill. No distress.   HENT:   Head: Normocephalic.   Nose: Nose normal.   Mouth/Throat: Mucous membranes are moist. Oropharynx is clear.   Eyes: Pupils are equal, round, and reactive to light. Right eye exhibits no discharge and no hordeolum. Left eye exhibits no discharge and no hordeolum. No scleral icterus. Extraocular movement intact vision grossly intact   Cardiovascular: Normal rate and regular rhythm.   Pulmonary/Chest: Effort normal and breath sounds normal.   Abdominal: Normal appearance.   Musculoskeletal: Normal range of " motion.         General: Normal range of motion.   Neurological: She is alert and oriented to person, place, and time.   Skin: Skin is warm, dry, not diaphoretic and rash. erythema         Comments: Plant dermatitis to face, hand, arms.  Right periorbital area: erythema with mild skin induration (local reaction from plant dermatitis vs periorbital cellulitis)  No eye drainage.   Full ROM of globes of eyes.    Psychiatric: Her behavior is normal. Mood normal.   Nursing note and vitals reviewed.      Assessment:     1. Periorbital cellulitis of right eye    2. Facial swelling    3. Plant dermatitis        Plan:   Pt pleaded for steroid injection, even after education side effects-brennen with her medical history. Pt verbalized understanding of SE with regards to her history.   Treating empirically for periorbital cellulitis, however considered that this could be more local allergic reaction.     Periorbital cellulitis of right eye  -     sulfamethoxazole-trimethoprim 800-160mg (BACTRIM DS) 800-160 mg Tab; Take 1 tablet by mouth 2 (two) times daily. for 7 days  Dispense: 14 tablet; Refill: 0    Facial swelling  -     dexAMETHasone injection 10 mg    Plant dermatitis  -     dexAMETHasone injection 10 mg      Patient Instructions   Follow up with any worsening symptoms     You received a steroid shot today.   Side effects of steroids include elevated blood pressure, elevated blood sugar, water weight gain,   nervous energy, redness to the face, and dimpling of the skin where shot was injected.

## 2024-11-24 ENCOUNTER — OFFICE VISIT (OUTPATIENT)
Dept: URGENT CARE | Facility: CLINIC | Age: 49
End: 2024-11-24
Payer: COMMERCIAL

## 2024-11-24 VITALS
TEMPERATURE: 98 F | BODY MASS INDEX: 37.44 KG/M2 | SYSTOLIC BLOOD PRESSURE: 118 MMHG | HEART RATE: 65 BPM | RESPIRATION RATE: 16 BRPM | OXYGEN SATURATION: 98 % | DIASTOLIC BLOOD PRESSURE: 86 MMHG | WEIGHT: 225 LBS

## 2024-11-24 DIAGNOSIS — R21 RASH OF FACE: Primary | ICD-10-CM

## 2024-11-24 PROCEDURE — 96372 THER/PROPH/DIAG INJ SC/IM: CPT | Mod: S$GLB,,, | Performed by: FAMILY MEDICINE

## 2024-11-24 PROCEDURE — 99213 OFFICE O/P EST LOW 20 MIN: CPT | Mod: 25,S$GLB,, | Performed by: FAMILY MEDICINE

## 2024-11-24 RX ORDER — HYDROCORTISONE 25 MG/G
CREAM TOPICAL 2 TIMES DAILY
Qty: 20 G | Refills: 1 | Status: SHIPPED | OUTPATIENT
Start: 2024-11-24

## 2024-11-24 RX ORDER — SULFAMETHOXAZOLE AND TRIMETHOPRIM 800; 160 MG/1; MG/1
1 TABLET ORAL 2 TIMES DAILY
Qty: 20 TABLET | Refills: 0 | Status: SHIPPED | OUTPATIENT
Start: 2024-11-24

## 2024-11-24 RX ORDER — CEFTRIAXONE 1 G/1
1 INJECTION, POWDER, FOR SOLUTION INTRAMUSCULAR; INTRAVENOUS
Status: COMPLETED | OUTPATIENT
Start: 2024-11-24 | End: 2024-11-24

## 2024-11-24 RX ORDER — LIDOCAINE HYDROCHLORIDE 10 MG/ML
2.1 INJECTION, SOLUTION INFILTRATION; PERINEURAL
Status: COMPLETED | OUTPATIENT
Start: 2024-11-24 | End: 2024-11-24

## 2024-11-24 RX ADMIN — LIDOCAINE HYDROCHLORIDE 2.1 ML: 10 INJECTION, SOLUTION INFILTRATION; PERINEURAL at 12:11

## 2024-11-24 RX ADMIN — CEFTRIAXONE 1 G: 1 INJECTION, POWDER, FOR SOLUTION INTRAMUSCULAR; INTRAVENOUS at 12:11

## 2024-11-24 NOTE — PROGRESS NOTES
Subjective:      Patient ID: Cristina Sprague is a 49 y.o. female.    Vitals:  weight is 102.1 kg (225 lb). Her oral temperature is 98.4 °F (36.9 °C). Her blood pressure is 118/86 and her pulse is 65. Her respiration is 16 and oxygen saturation is 98%.     Chief Complaint: Rash (Entered by patient)    This is a 49 y.o. female who presents today with a chief complaint of rash to face that has worsened since yesterday. Pt says he has battled this rash x 3 weeks. Pt has been seen for this rash 2-3 times previously and says the only thing that has cleared the rash was a steroid injection. No known cause, but pt says the first outbreak occurred post working in the yard. Pt says rash itches and burns.     Home tx: zyrtec, clobetasole, hydroxyzine (last night)    PPMH: none    Rash  This is a new problem. The current episode started yesterday. The problem has been rapidly worsening since onset. The affected locations include the face, left arm and right arm. Pertinent negatives include no congestion, cough, diarrhea, facial edema, fatigue, fever or vomiting.       Constitution: Negative for fatigue and fever.   HENT:  Negative for congestion.    Respiratory:  Negative for cough.    Gastrointestinal:  Negative for vomiting and diarrhea.   Skin:  Positive for rash.      Objective:     Physical Exam   Constitutional: She does not appear ill. No distress. obesity  Abdominal: Normal appearance.   Neurological: She is alert.   Nursing note and vitals reviewed.      Assessment:     1. Rash of face      Possible recurrent cellulitis. Will treat presumptively and given referral to dermatology. Rtc prn worsening symptoms  Plan:       Rash of face  -     cefTRIAXone injection 1 g  -     LIDOcaine HCL 10 mg/ml (1%) injection 2.1 mL  -     sulfamethoxazole-trimethoprim 800-160mg (BACTRIM DS) 800-160 mg Tab; Take 1 tablet by mouth 2 (two) times daily.  Dispense: 20 tablet; Refill: 0  -     hydrocortisone 2.5 % cream; Apply topically 2  (two) times daily.  Dispense: 20 g; Refill: 1  -     Ambulatory referral/consult to Dermatology

## 2024-11-27 ENCOUNTER — TELEPHONE (OUTPATIENT)
Dept: DERMATOLOGY | Facility: CLINIC | Age: 49
End: 2024-11-27
Payer: COMMERCIAL

## 2024-12-04 ENCOUNTER — OFFICE VISIT (OUTPATIENT)
Dept: DERMATOLOGY | Facility: CLINIC | Age: 49
End: 2024-12-04
Payer: COMMERCIAL

## 2024-12-04 ENCOUNTER — LAB VISIT (OUTPATIENT)
Dept: LAB | Facility: HOSPITAL | Age: 49
End: 2024-12-04
Attending: STUDENT IN AN ORGANIZED HEALTH CARE EDUCATION/TRAINING PROGRAM
Payer: COMMERCIAL

## 2024-12-04 DIAGNOSIS — R21 RASH: ICD-10-CM

## 2024-12-04 DIAGNOSIS — R21 RASH: Primary | ICD-10-CM

## 2024-12-04 DIAGNOSIS — L30.9 DERMATITIS: ICD-10-CM

## 2024-12-04 PROCEDURE — 1159F MED LIST DOCD IN RCRD: CPT | Mod: CPTII,S$GLB,, | Performed by: STUDENT IN AN ORGANIZED HEALTH CARE EDUCATION/TRAINING PROGRAM

## 2024-12-04 PROCEDURE — 86225 DNA ANTIBODY NATIVE: CPT | Performed by: STUDENT IN AN ORGANIZED HEALTH CARE EDUCATION/TRAINING PROGRAM

## 2024-12-04 PROCEDURE — 99203 OFFICE O/P NEW LOW 30 MIN: CPT | Mod: S$GLB,,, | Performed by: STUDENT IN AN ORGANIZED HEALTH CARE EDUCATION/TRAINING PROGRAM

## 2024-12-04 PROCEDURE — 86039 ANTINUCLEAR ANTIBODIES (ANA): CPT | Performed by: STUDENT IN AN ORGANIZED HEALTH CARE EDUCATION/TRAINING PROGRAM

## 2024-12-04 PROCEDURE — 1160F RVW MEDS BY RX/DR IN RCRD: CPT | Mod: CPTII,S$GLB,, | Performed by: STUDENT IN AN ORGANIZED HEALTH CARE EDUCATION/TRAINING PROGRAM

## 2024-12-04 PROCEDURE — 36415 COLL VENOUS BLD VENIPUNCTURE: CPT | Performed by: STUDENT IN AN ORGANIZED HEALTH CARE EDUCATION/TRAINING PROGRAM

## 2024-12-04 PROCEDURE — 99999 PR PBB SHADOW E&M-EST. PATIENT-LVL III: CPT | Mod: PBBFAC,,, | Performed by: STUDENT IN AN ORGANIZED HEALTH CARE EDUCATION/TRAINING PROGRAM

## 2024-12-04 PROCEDURE — 83516 IMMUNOASSAY NONANTIBODY: CPT | Mod: 59 | Performed by: STUDENT IN AN ORGANIZED HEALTH CARE EDUCATION/TRAINING PROGRAM

## 2024-12-04 PROCEDURE — 86038 ANTINUCLEAR ANTIBODIES: CPT | Performed by: STUDENT IN AN ORGANIZED HEALTH CARE EDUCATION/TRAINING PROGRAM

## 2024-12-04 RX ORDER — TACROLIMUS 1 MG/G
OINTMENT TOPICAL 2 TIMES DAILY
Qty: 60 G | Refills: 2 | Status: SHIPPED | OUTPATIENT
Start: 2024-12-04

## 2024-12-04 RX ORDER — TRIAMCINOLONE ACETONIDE 1 MG/G
CREAM TOPICAL 2 TIMES DAILY
Qty: 454 G | Refills: 1 | Status: SHIPPED | OUTPATIENT
Start: 2024-12-04

## 2024-12-04 NOTE — PROGRESS NOTES
Subjective:      Patient ID:  Cristina Sprague is a 49 y.o. female who presents for   Chief Complaint   Patient presents with    Rash     Pt here for rash/ allergic reaction    Pt has had off and on rash since June on face, arms, and chest. Pt has been using Aveeno anti-itch lotion and calamine lotion.     Pt also went to Urgent care and received antibiotic injections due to facial swelling in addition to rash.     Pt recently took prednisone tablets for 2 days and itching has resolved.     Rash      Recurrent flaring rash on b/l cheeks, neck, dorsal forearms, v of chest  Primary symptoms is itching  Responds to steroids    Only med med is monjourno  Other home meds include prn lasix, effexor, eliquis, nexium    Review of Systems   Skin:  Positive for rash.       Objective:   Physical Exam   Constitutional: She appears well-developed and well-nourished.   Neurological: She is alert and oriented to person, place, and time.   Psychiatric: She has a normal mood and affect.   Skin:   Areas Examined (abnormalities noted in diagram):   Head / Face Inspection Performed  Chest / Axilla Inspection Performed  RUE Inspected  LUE Inspection Performed            Diagram Legend     Erythematous scaling macule/papule c/w actinic keratosis       Vascular papule c/w angioma      Pigmented verrucoid papule/plaque c/w seborrheic keratosis      Yellow umbilicated papule c/w sebaceous hyperplasia      Irregularly shaped tan macule c/w lentigo     1-2 mm smooth white papules consistent with Milia      Movable subcutaneous cyst with punctum c/w epidermal inclusion cyst      Subcutaneous movable cyst c/w pilar cyst      Firm pink to brown papule c/w dermatofibroma      Pedunculated fleshy papule(s) c/w skin tag(s)      Evenly pigmented macule c/w junctional nevus     Mildly variegated pigmented, slightly irregular-bordered macule c/w mildly atypical nevus      Flesh colored to evenly pigmented papule c/w intradermal nevus       Pink pearly  papule/plaque c/w basal cell carcinoma      Erythematous hyperkeratotic cursted plaque c/w SCC      Surgical scar with no sign of skin cancer recurrence      Open and closed comedones      Inflammatory papules and pustules      Verrucoid papule consistent consistent with wart     Erythematous eczematous patches and plaques     Dystrophic onycholytic nail with subungual debris c/w onychomycosis     Umbilicated papule    Erythematous-base heme-crusted tan verrucoid plaque consistent with inflamed seborrheic keratosis     Erythematous Silvery Scaling Plaque c/w Psoriasis     See annotation      Assessment / Plan:        Rash  -     OMARI Screen w/Reflex; Future; Expected date: 12/04/2024  -     MyoMarker Panel 3; Future; Expected date: 12/04/2024  -     triamcinolone acetonide 0.1% (KENALOG) 0.1 % cream; Apply topically 2 (two) times daily. Use to affected areas for up to 2 weeks then take a 1 week break or decrease to 3 times weekly. Do not apply to groin or face. Apply to rash on body when flares  Dispense: 454 g; Refill: 1  -     tacrolimus (PROTOPIC) 0.1 % ointment; Apply topically 2 (two) times daily. Apply to rash on face when needed  Dispense: 60 g; Refill: 2    Dermatitis      - recurrent episodic pruritic erythematous eruption on cheeks, upper chest, dorsal forearms, posterior neck. Somewhat photodistributed. Ddx photoallergic, PMLE, CTD, ACD, dermatitis. No typical cutaneous signs of connective tissue disease today  - labs as above  - counseled on photoprotection with zinc based sunscreen  - safe list provided  - TAC PRN to body  - protopic bid prn to face  - if continues to flare then consider patch testing and biopsy when rash is flaring         Follow up if symptoms worsen or fail to improve.

## 2024-12-05 LAB
ANA PATTERN 1: NORMAL
ANA SER QL IF: POSITIVE
ANA TITR SER IF: NORMAL {TITER}

## 2024-12-09 ENCOUNTER — TELEPHONE (OUTPATIENT)
Dept: DERMATOLOGY | Facility: CLINIC | Age: 49
End: 2024-12-09
Payer: COMMERCIAL

## 2024-12-09 DIAGNOSIS — R21 RASH: Primary | ICD-10-CM

## 2024-12-09 NOTE — TELEPHONE ENCOUNTER
History of photodistributed rash. Unable to biopsy as it was not flaring. OMARI done to evaluate for CTD and was + to 1:160 I called the patient and informed her. WIll refer to rheum for further eval. I also recommended the patient follow up when the rash is flaring so I can biopsy it when active

## 2024-12-10 DIAGNOSIS — N95.1 VASOMOTOR SYMPTOMS DUE TO MENOPAUSE: ICD-10-CM

## 2024-12-10 LAB
ANTI SM ANTIBODY: 0.07 RATIO (ref 0–0.99)
ANTI SM/RNP ANTIBODY: 0.06 RATIO (ref 0–0.99)
ANTI-SM INTERPRETATION: NEGATIVE
ANTI-SM/RNP INTERPRETATION: NEGATIVE
ANTI-SSA ANTIBODY: 0.05 RATIO (ref 0–0.99)
ANTI-SSA INTERPRETATION: NEGATIVE
ANTI-SSB ANTIBODY: 0.05 RATIO (ref 0–0.99)
ANTI-SSB INTERPRETATION: NEGATIVE
DSDNA AB SER-ACNC: NORMAL [IU]/ML

## 2024-12-10 RX ORDER — VENLAFAXINE HYDROCHLORIDE 150 MG/1
150 CAPSULE, EXTENDED RELEASE ORAL DAILY
Qty: 90 CAPSULE | Refills: 0 | Status: SHIPPED | OUTPATIENT
Start: 2024-12-10

## 2024-12-10 NOTE — TELEPHONE ENCOUNTER
Refill Routing Note   Medication(s) are not appropriate for processing by [facility] for the following reason(s):        Patient not seen by provider within 15 months    [facility] action(s):  Defer               Appointments  past 12m or future 3m with PCP    Date Provider   Last Visit   5/17/2023 Delma Roach MD   Next Visit   12/18/2024 Delma Roach MD   ED visits in past 90 days: 0        Note composed:6:53 AM 12/10/2024

## 2025-01-25 ENCOUNTER — TELEPHONE (OUTPATIENT)
Dept: OBSTETRICS AND GYNECOLOGY | Facility: CLINIC | Age: 50
End: 2025-01-25
Payer: COMMERCIAL

## 2025-03-07 DIAGNOSIS — N95.1 VASOMOTOR SYMPTOMS DUE TO MENOPAUSE: ICD-10-CM

## 2025-03-07 RX ORDER — VENLAFAXINE HYDROCHLORIDE 150 MG/1
150 CAPSULE, EXTENDED RELEASE ORAL DAILY
Qty: 90 CAPSULE | Refills: 0 | Status: SHIPPED | OUTPATIENT
Start: 2025-03-07

## 2025-03-07 NOTE — TELEPHONE ENCOUNTER
Refill Routing Note   Medication(s) are not appropriate for processing by [facility] for the following reason(s):        Patient not seen by provider within 15 months    [facility] action(s):  Defer               Appointments  past 12m or future 3m with PCP    Date Provider   Last Visit   5/17/2023 Delma Roach MD   Next Visit   Visit date not found Delma Roach MD   ED visits in past 90 days: 0        Note composed:5:45 AM 03/07/2025

## 2025-04-09 ENCOUNTER — OFFICE VISIT (OUTPATIENT)
Dept: OBSTETRICS AND GYNECOLOGY | Facility: CLINIC | Age: 50
End: 2025-04-09
Payer: COMMERCIAL

## 2025-04-09 ENCOUNTER — PATIENT MESSAGE (OUTPATIENT)
Dept: UROLOGY | Facility: CLINIC | Age: 50
End: 2025-04-09
Payer: COMMERCIAL

## 2025-04-09 ENCOUNTER — TELEPHONE (OUTPATIENT)
Dept: UROLOGY | Facility: CLINIC | Age: 50
End: 2025-04-09
Payer: COMMERCIAL

## 2025-04-09 VITALS
SYSTOLIC BLOOD PRESSURE: 99 MMHG | HEART RATE: 85 BPM | WEIGHT: 223.56 LBS | DIASTOLIC BLOOD PRESSURE: 65 MMHG | BODY MASS INDEX: 37.2 KG/M2

## 2025-04-09 DIAGNOSIS — Z90.710 S/P LAPAROSCOPIC HYSTERECTOMY: ICD-10-CM

## 2025-04-09 DIAGNOSIS — Z86.718 HISTORY OF DEEP VENOUS THROMBOSIS: ICD-10-CM

## 2025-04-09 DIAGNOSIS — N95.1 VASOMOTOR SYMPTOMS DUE TO MENOPAUSE: ICD-10-CM

## 2025-04-09 DIAGNOSIS — Z01.419 ENCOUNTER FOR ANNUAL ROUTINE GYNECOLOGICAL EXAMINATION: Primary | ICD-10-CM

## 2025-04-09 DIAGNOSIS — Z12.31 BREAST CANCER SCREENING BY MAMMOGRAM: ICD-10-CM

## 2025-04-09 DIAGNOSIS — N95.1 INSOMNIA ASSOCIATED WITH MENOPAUSE: ICD-10-CM

## 2025-04-09 DIAGNOSIS — Z86.711 HISTORY OF PULMONARY EMBOLISM: ICD-10-CM

## 2025-04-09 PROBLEM — M23.50 CHRONIC INSTABILITY OF KNEE: Status: RESOLVED | Noted: 2021-10-27 | Resolved: 2025-04-09

## 2025-04-09 PROBLEM — Z20.828 CONTACT WITH AND (SUSPECTED) EXPOSURE TO OTHER VIRAL COMMUNICABLE DISEASES: Status: RESOLVED | Noted: 2020-12-11 | Resolved: 2025-04-09

## 2025-04-09 PROBLEM — U07.1 COVID-19: Status: RESOLVED | Noted: 2021-10-27 | Resolved: 2025-04-09

## 2025-04-09 PROBLEM — Z11.8 SCREENING FOR OTHER SPECIFIC VIRAL AND CHLAMYDIAL DISEASES: Status: RESOLVED | Noted: 2020-12-09 | Resolved: 2025-04-09

## 2025-04-09 PROBLEM — Z11.59 SCREENING FOR OTHER SPECIFIC VIRAL AND CHLAMYDIAL DISEASES: Status: RESOLVED | Noted: 2020-12-09 | Resolved: 2025-04-09

## 2025-04-09 PROBLEM — M25.669 DECREASED RANGE OF MOTION (ROM) OF KNEE: Status: RESOLVED | Noted: 2021-12-14 | Resolved: 2025-04-09

## 2025-04-09 PROBLEM — R23.2 HOT FLASHES: Status: ACTIVE | Noted: 2024-10-03

## 2025-04-09 PROBLEM — G47.00 INSOMNIA: Status: ACTIVE | Noted: 2024-10-03

## 2025-04-09 PROBLEM — M25.569 KNEE PAIN: Status: RESOLVED | Noted: 2021-10-27 | Resolved: 2025-04-09

## 2025-04-09 PROBLEM — I89.0 LYMPHEDEMA: Status: ACTIVE | Noted: 2024-05-13

## 2025-04-09 PROCEDURE — 99999 PR PBB SHADOW E&M-EST. PATIENT-LVL III: CPT | Mod: PBBFAC,,, | Performed by: OBSTETRICS & GYNECOLOGY

## 2025-04-09 PROCEDURE — 3078F DIAST BP <80 MM HG: CPT | Mod: CPTII,S$GLB,, | Performed by: OBSTETRICS & GYNECOLOGY

## 2025-04-09 PROCEDURE — 99396 PREV VISIT EST AGE 40-64: CPT | Mod: S$GLB,,, | Performed by: OBSTETRICS & GYNECOLOGY

## 2025-04-09 PROCEDURE — 1159F MED LIST DOCD IN RCRD: CPT | Mod: CPTII,S$GLB,, | Performed by: OBSTETRICS & GYNECOLOGY

## 2025-04-09 PROCEDURE — 3074F SYST BP LT 130 MM HG: CPT | Mod: CPTII,S$GLB,, | Performed by: OBSTETRICS & GYNECOLOGY

## 2025-04-09 PROCEDURE — 3008F BODY MASS INDEX DOCD: CPT | Mod: CPTII,S$GLB,, | Performed by: OBSTETRICS & GYNECOLOGY

## 2025-04-09 RX ORDER — FEZOLINETANT 45 MG/1
45 TABLET, FILM COATED ORAL DAILY
Qty: 90 TABLET | Refills: 3 | Status: SHIPPED | OUTPATIENT
Start: 2025-04-09

## 2025-04-09 NOTE — TELEPHONE ENCOUNTER
We apologize for the inconvenience, but unfortunately the provider will not be in the office for your upcoming appointment. Attempts to reach you by phone were unsuccessful. LVM we have rescheduled your appointment to the next available date and time. Please reach out via the portal or phone if your new appointment date and time does not work and we will get you rescheduled.

## 2025-04-09 NOTE — PROGRESS NOTES
Chief Complaint: Well Woman Exam     HPI:      Cristina Sprague is a 49 y.o.  s/p RALH/BSO for benign adnexal mass in 2022 who presents for annual exam. Patient with personal history of PE/DVT on long term anticoagulation and currently using Effexor for management of anxiety and vasomotor symptoms. Patient continues to report frequent night sweats that are bothersome and refractory Effexor.     Previous Pap:  no abnormalities (2021)  Previous Mammogram:    Results for orders placed during the hospital encounter of 24    Mammo Digital Screening Bilat w/ Marco Antonio    Narrative  Result:  Mammo Digital Screening Bilat w/ Marco Antonio    History:  Patient is 48 y.o. and is seen for a screening mammogram.      Films Compared:  Prior images (if available) were compared.    Findings:  This procedure was performed using tomosynthesis.  Computer-aided detection was utilized in the interpretation of this examination.    The breasts have scattered areas of fibroglandular density. There is no evidence of suspicious masses, microcalcifications or architectural distortion.    Impression  No mammographic evidence of malignancy.    BI-RADS Category 1: Negative    Recommendation:  Routine screening mammogram in 1 year is recommended.    Your estimated lifetime risk of breast cancer (to age 85) based on Tyrer-Cuzick risk assessment model is 8.45%.  According to the American [employer] Society, patients with a lifetime breast cancer risk of 20% or higher might benefit from supplemental screening tests, such as screening breast MRI.     Most Recent Dexa: not indicated  Colonoscopy: , repeat 7 years    COVID vaccine: completed series  Gardasil:has never had     Problem List[1]    Past Medical History:   Diagnosis Date    Chronic instability of knee 10/27/2021    COVID-19 10/27/2021    Decreased range of motion (ROM) of knee 2021    Ectopic pregnancy 2014    History of blood clots     x 4-5?, all while pregnant    Morbid  obesity     MTHFR mutation     Pulmonary embolism 2006    Obesity, OCPs       Past Surgical History:   Procedure Laterality Date    BARIATRIC SURGERY  2020     SECTION      x2    CHOLECYSTECTOMY      COLONOSCOPY N/A 2023    Procedure: COLONOSCOPY;  Surgeon: Ryan Poe MD;  Location: [facility] ENDOSCOPY;  Service: Endoscopy;  Laterality: N/A;  referral: rudi Whitney, portal - Pt request CV and any MD on this date- ERW  ok to hold Eliquis 2 days per Dr Cohen-scanned into chart-GT  pre call complete, stated she would have a ride -CE    ASUNCION FILTER PLACEMENT  2018    KNEE ARTHROPLASTY Right 2021    Procedure: ARTHROPLASTY, KNEE;  Surgeon: Storm See MD;  Location: [facility] OR;  Service: Orthopedics;  Laterality: Right;  depuy notified  CC  Depuy confirmed 21 AM    LAPAROSCOPIC APPENDECTOMY N/A 2022    Procedure: APPENDECTOMY, LAPAROSCOPIC;  Surgeon: Aurelio Inman MD;  [facility];  Service: Oncology;  Laterality: N/A;    LUMBAR FUSION      L4-L5    ROBOT-ASSISTED LAPAROSCOPIC ABDOMINAL HYSTERECTOMY USING DA FLORIAN XI  2022    Procedure: XI ROBOTIC HYSTERECTOMY;  Surgeon: Aurelio Inman MD;  Location: [facility] OR [facility] FLR;  Service: Oncology;;    ROBOT-ASSISTED LAPAROSCOPIC LYSIS OF ADHESIONS USING DA FLORIAN XI  2022    Procedure: XI ROBOTIC LYSIS, ADHESIONS;  Surgeon: Aurelio Inman MD;  Location: [facility] OR 2ND FLR;  Service: Oncology;;    SALPINGECTOMY Left 2022    Procedure: SALPINGECTOMY;  Surgeon: Aurelio Inman MD;  Location: [facility] OR 2ND FLR;  Service: Oncology;  Laterality: Left;    SALPINGOOPHORECTOMY Right 2022    Procedure: SALPINGO-OOPHORECTOMY;  Surgeon: Aurelio Inman MD;  Location: [facility] OR 2ND FLR;  Service: Oncology;  Laterality: Right;  PROCEED AS INDICATED    TUBAL LIGATION         OB History          3    Para   2    Term   2            AB   1    Living   2         SAB        IAB        Ectopic   1     Multiple        Live Births   2           Obstetric Comments   14/q 26-31 days/up to 14 days  No abnormal pap  No STDs  CD x 2, Ectopic s/p MTX  Multiple VTE on OCPs with pregnancy (IVC filter in place)               ROS:     Review of Systems   Constitutional:  Negative for activity change, fatigue and unexpected weight change.   Respiratory:  Negative for shortness of breath.    Cardiovascular:  Negative for chest pain.   Gastrointestinal:  Negative for abdominal pain, blood in stool, constipation and diarrhea.   Endocrine: Negative for cold intolerance and heat intolerance.   Genitourinary:  Positive for hot flashes. Negative for bladder incontinence, dyspareunia, dysuria, frequency, vaginal bleeding and vaginal dryness.   Integumentary:  Negative for breast mass, breast discharge and breast tenderness.   Psychiatric/Behavioral:  Negative for dysphoric mood. The patient is not nervous/anxious.    Breast: Negative for mass and tenderness      Physical Exam:      PHYSICAL EXAM:  BP 99/65 (BP Location: Right arm, Patient Position: Sitting)   Pulse 85   Wt 101.4 kg (223 lb 8.7 oz)   LMP 02/11/2022   BMI 37.20 kg/m²   Body mass index is 37.2 kg/m².     APPEARANCE: Well nourished, well developed, in no acute distress.  PSYCH: Appropriate mood and affect.  SKIN: No acne or hirsutism  NECK: Neck symmetric without masses or thyromegaly  NODES: No inguinal, axillary, or supraclavicular lymph node enlargement  ABDOMEN: Soft.  No tenderness or masses.    CARDIOVASCULAR: No edema of peripheral extremities  BREASTS: Symmetrical, no skin changes or visible lesions.  No palpable masses or nipple discharge bilaterally.  PELVIC: Normal external genitalia without lesions.  Normal hair distribution.  Adequate perineal body, normal urethral meatus.  Vagina moist and well rugated without lesions or discharge. No significant cystocele or rectocele.  Uterus and cervix surgically absent. Adnexa without masses or tenderness.       Assessment:     1. Encounter for annual routine gynecological examination        2. Breast cancer screening by mammogram  Mammo Digital Screening Bilat w/ Marco Antonio (XPD)      3. s/p RA-TLH/BS/RO/cystectomy/appendectomy/JOEY        4. History of pulmonary embolism  fezolinetant (VEOZAH) 45 mg Tab      5. History of deep venous thrombosis        6. Vasomotor symptoms due to menopause  Comprehensive Metabolic Panel    Comprehensive Metabolic Panel    fezolinetant (VEOZAH) 45 mg Tab      7. Insomnia associated with menopause              Plan:     Clinical breast exam performed.  Pap history reviewed.  No further pap screening indicated given hysterectomy for benign indications.  Mammogram ordered.  DEXA at 67.  Colonoscopy UTD.  Add Veozah to Effexor for VMS. Discussed serial CMP over the first 9 months.  Follow up in about 1 year (around 4/9/2026) for annual well woman exam or as needed.    Counseling:     Patient was counseled today on current ASCCP pap guidelines, the recommendation for yearly pelvic exams, healthy diet and exercise routines, breast self awareness and annual mammograms.She is to see her PCP for other health maintenance.     Use of the [facility] Patient Portal discussed and encouraged during today's visit.         Delma Roach MD  [facility]  04/09/2025         [1]   Patient Active Problem List  Diagnosis    Obesity    Anxiety    History of pulmonary embolism    History of deep venous thrombosis    Depressive disorder    Lumbar radiculopathy    Osteoarthritis of knee    Sprain of MCL (medial collateral ligament) of knee    History of right knee joint replacement    Decreased strength, endurance, and mobility    s/p RA-TLH/BS/RO/cystectomy/appendectomy/JOEY    Pain of left upper extremity    Weakness    Stiffness due to immobility    Edema of both legs    Gastroesophageal reflux disease without esophagitis    History of gastric bypass    YECENIA (iron deficiency anemia)    Long  term current use of anticoagulant therapy    Methylenetetrahydrofolate reductase (MTHFR) deficiency    Hot flashes    Insomnia    Lymphedema

## 2025-04-11 ENCOUNTER — LAB VISIT (OUTPATIENT)
Dept: LAB | Facility: HOSPITAL | Age: 50
End: 2025-04-11
Payer: COMMERCIAL

## 2025-04-11 DIAGNOSIS — N95.1 VASOMOTOR SYMPTOMS DUE TO MENOPAUSE: ICD-10-CM

## 2025-04-11 LAB
ALBUMIN SERPL BCP-MCNC: 3.2 G/DL (ref 3.5–5.2)
ALP SERPL-CCNC: 40 UNIT/L (ref 40–150)
ALT SERPL W/O P-5'-P-CCNC: 8 UNIT/L (ref 10–44)
ANION GAP (OHS): 6 MMOL/L (ref 8–16)
AST SERPL-CCNC: 10 UNIT/L (ref 11–45)
BILIRUB SERPL-MCNC: 0.3 MG/DL (ref 0.1–1)
BUN SERPL-MCNC: 18 MG/DL (ref 6–20)
CALCIUM SERPL-MCNC: 8.5 MG/DL (ref 8.7–10.5)
CHLORIDE SERPL-SCNC: 107 MMOL/L (ref 95–110)
CO2 SERPL-SCNC: 25 MMOL/L (ref 23–29)
CREAT SERPL-MCNC: 0.6 MG/DL (ref 0.5–1.4)
GFR SERPLBLD CREATININE-BSD FMLA CKD-EPI: >60 ML/MIN/1.73/M2
GLUCOSE SERPL-MCNC: 77 MG/DL (ref 70–110)
POTASSIUM SERPL-SCNC: 3.8 MMOL/L (ref 3.5–5.1)
PROT SERPL-MCNC: 6.4 GM/DL (ref 6–8.4)
SODIUM SERPL-SCNC: 138 MMOL/L (ref 136–145)

## 2025-04-11 PROCEDURE — 80053 COMPREHEN METABOLIC PANEL: CPT

## 2025-04-11 PROCEDURE — 36415 COLL VENOUS BLD VENIPUNCTURE: CPT

## 2025-04-15 ENCOUNTER — RESULTS FOLLOW-UP (OUTPATIENT)
Dept: OBSTETRICS AND GYNECOLOGY | Facility: CLINIC | Age: 50
End: 2025-04-15

## 2025-05-02 ENCOUNTER — HOSPITAL ENCOUNTER (OUTPATIENT)
Dept: RADIOLOGY | Facility: HOSPITAL | Age: 50
Discharge: HOME | End: 2025-05-02
Attending: OBSTETRICS & GYNECOLOGY
Payer: COMMERCIAL

## 2025-05-02 VITALS — BODY MASS INDEX: 37.15 KG/M2 | HEIGHT: 65 IN | WEIGHT: 223 LBS

## 2025-05-02 DIAGNOSIS — Z12.31 BREAST CANCER SCREENING BY MAMMOGRAM: ICD-10-CM

## 2025-05-02 PROCEDURE — 77067 SCR MAMMO BI INCL CAD: CPT | Mod: TC

## 2025-05-02 PROCEDURE — 77067 SCR MAMMO BI INCL CAD: CPT | Mod: 26,,, | Performed by: RADIOLOGY

## 2025-05-02 PROCEDURE — 77063 BREAST TOMOSYNTHESIS BI: CPT | Mod: 26,,, | Performed by: RADIOLOGY

## 2025-06-01 DIAGNOSIS — N95.1 VASOMOTOR SYMPTOMS DUE TO MENOPAUSE: ICD-10-CM

## 2025-06-01 RX ORDER — VENLAFAXINE HYDROCHLORIDE 150 MG/1
150 CAPSULE, EXTENDED RELEASE ORAL DAILY
Qty: 90 CAPSULE | Refills: 3 | Status: SHIPPED | OUTPATIENT
Start: 2025-06-01

## 2025-08-07 ENCOUNTER — OFFICE VISIT (OUTPATIENT)
Dept: RHEUMATOLOGY | Facility: CLINIC | Age: 50
End: 2025-08-07
Payer: COMMERCIAL

## 2025-08-07 ENCOUNTER — LAB VISIT (OUTPATIENT)
Dept: LAB | Facility: HOSPITAL | Age: 50
End: 2025-08-07
Attending: STUDENT IN AN ORGANIZED HEALTH CARE EDUCATION/TRAINING PROGRAM
Payer: COMMERCIAL

## 2025-08-07 VITALS
HEIGHT: 65 IN | WEIGHT: 220.25 LBS | DIASTOLIC BLOOD PRESSURE: 74 MMHG | TEMPERATURE: 97 F | OXYGEN SATURATION: 100 % | BODY MASS INDEX: 36.69 KG/M2 | HEART RATE: 79 BPM | SYSTOLIC BLOOD PRESSURE: 104 MMHG

## 2025-08-07 DIAGNOSIS — L23.7 ALLERGIC CONTACT DERMATITIS DUE TO PLANTS, EXCEPT FOOD: Primary | ICD-10-CM

## 2025-08-07 DIAGNOSIS — I89.0 LYMPHEDEMA: ICD-10-CM

## 2025-08-07 DIAGNOSIS — Z86.718 HISTORY OF DEEP VENOUS THROMBOSIS: ICD-10-CM

## 2025-08-07 DIAGNOSIS — Z82.61 FAMILY HISTORY OF RHEUMATOID ARTHRITIS: ICD-10-CM

## 2025-08-07 DIAGNOSIS — E66.812 CLASS 2 OBESITY DUE TO EXCESS CALORIES WITHOUT SERIOUS COMORBIDITY WITH BODY MASS INDEX (BMI) OF 36.0 TO 36.9 IN ADULT: ICD-10-CM

## 2025-08-07 DIAGNOSIS — R60.9 LIPEDEMA: ICD-10-CM

## 2025-08-07 DIAGNOSIS — I73.00 RAYNAUD'S DISEASE WITHOUT GANGRENE: ICD-10-CM

## 2025-08-07 DIAGNOSIS — E66.09 CLASS 2 OBESITY DUE TO EXCESS CALORIES WITHOUT SERIOUS COMORBIDITY WITH BODY MASS INDEX (BMI) OF 36.0 TO 36.9 IN ADULT: ICD-10-CM

## 2025-08-07 DIAGNOSIS — H04.123 DRY EYE SYNDROME OF BOTH EYES: ICD-10-CM

## 2025-08-07 DIAGNOSIS — Z71.89 COUNSELING AND COORDINATION OF CARE: ICD-10-CM

## 2025-08-07 DIAGNOSIS — R21 RASH: ICD-10-CM

## 2025-08-07 DIAGNOSIS — E72.12 METHYLENETETRAHYDROFOLATE REDUCTASE (MTHFR) DEFICIENCY: ICD-10-CM

## 2025-08-07 LAB
CYCLIC CITRULLINATED PEPTIDE (CCP) (OHS): <0.5 U/ML
RHEUMATOID FACT SERPL-ACNC: <13 IU/ML

## 2025-08-07 PROCEDURE — 3008F BODY MASS INDEX DOCD: CPT | Mod: CPTII,S$GLB,, | Performed by: STUDENT IN AN ORGANIZED HEALTH CARE EDUCATION/TRAINING PROGRAM

## 2025-08-07 PROCEDURE — 99205 OFFICE O/P NEW HI 60 MIN: CPT | Mod: S$GLB,,, | Performed by: STUDENT IN AN ORGANIZED HEALTH CARE EDUCATION/TRAINING PROGRAM

## 2025-08-07 PROCEDURE — 86200 CCP ANTIBODY: CPT

## 2025-08-07 PROCEDURE — 86431 RHEUMATOID FACTOR QUANT: CPT

## 2025-08-07 PROCEDURE — 99999 PR PBB SHADOW E&M-EST. PATIENT-LVL IV: CPT | Mod: PBBFAC,,, | Performed by: STUDENT IN AN ORGANIZED HEALTH CARE EDUCATION/TRAINING PROGRAM

## 2025-08-07 PROCEDURE — 3078F DIAST BP <80 MM HG: CPT | Mod: CPTII,S$GLB,, | Performed by: STUDENT IN AN ORGANIZED HEALTH CARE EDUCATION/TRAINING PROGRAM

## 2025-08-07 PROCEDURE — 3074F SYST BP LT 130 MM HG: CPT | Mod: CPTII,S$GLB,, | Performed by: STUDENT IN AN ORGANIZED HEALTH CARE EDUCATION/TRAINING PROGRAM

## 2025-08-07 PROCEDURE — 1159F MED LIST DOCD IN RCRD: CPT | Mod: CPTII,S$GLB,, | Performed by: STUDENT IN AN ORGANIZED HEALTH CARE EDUCATION/TRAINING PROGRAM

## 2025-08-07 PROCEDURE — 36415 COLL VENOUS BLD VENIPUNCTURE: CPT

## 2025-08-07 PROCEDURE — 1160F RVW MEDS BY RX/DR IN RCRD: CPT | Mod: CPTII,S$GLB,, | Performed by: STUDENT IN AN ORGANIZED HEALTH CARE EDUCATION/TRAINING PROGRAM

## (undated) DEVICE — BAG TISSUE RETRIEVAL 5MM

## (undated) DEVICE — SUT 2/0 36IN COATED VICRYL

## (undated) DEVICE — GLOVE BIOGEL PIMICRO INDIC 6.5

## (undated) DEVICE — HOOD T-5 TEAR AWAY STERILE

## (undated) DEVICE — SUT STRATAFIX 1 PDS CT-1

## (undated) DEVICE — BELLOW CANN HEMOBLAST 1.65GR

## (undated) DEVICE — BANDAID GARFIELD

## (undated) DEVICE — DRAPE PLASTIC U 60X72

## (undated) DEVICE — GLOVE BIOGEL SZ 8 1/2

## (undated) DEVICE — TRAY FOLEY 16FR INFECTION CONT

## (undated) DEVICE — SUT 0 18IN COATED VICRYL V

## (undated) DEVICE — SOL NACL IRR 3000ML

## (undated) DEVICE — PAD PREP 50/CA

## (undated) DEVICE — SUT MONOCRYL 4-0 PS-1 UND

## (undated) DEVICE — SEE MEDLINE ITEM 156902

## (undated) DEVICE — DRAPE INCISE IOBAN 2 23X23IN

## (undated) DEVICE — SUT 0 54IN COATED VICRYL U

## (undated) DEVICE — GAUZE SPONGE PEANUT STRL

## (undated) DEVICE — TOWEL OR DISP STRL BLUE 4/PK

## (undated) DEVICE — SUT 2-0 27IN VICRYL PLUS CT

## (undated) DEVICE — TIP YANKAUERS BULB NO VENT

## (undated) DEVICE — LEGGING CLEAR POLY 2/PACK

## (undated) DEVICE — TROCAR KII BLLN 12MM 10CM

## (undated) DEVICE — SUT 1 36IN COATED VICRYL UN

## (undated) DEVICE — GLOVE SURGICAL LATEX SZ 8

## (undated) DEVICE — PAD ABD 8X10 STERILE

## (undated) DEVICE — SEE MEDLINE ITEM 157131

## (undated) DEVICE — SEAL UNIVERSAL 5MM-8MM XI

## (undated) DEVICE — TOURNIQUET SB QC DP 34X4IN

## (undated) DEVICE — KIT PROCEDURE STER INLET CLOSU

## (undated) DEVICE — SYR 30CC LUER LOCK

## (undated) DEVICE — NDL 18GA X1 1/2 REG BEVEL

## (undated) DEVICE — SEE MEDLINE ITEM 157181

## (undated) DEVICE — GOWN SURGICAL XX LARGE X LONG

## (undated) DEVICE — DRAPE INCISE IOBAN 2 23X17IN

## (undated) DEVICE — NDL 22GA X1 1/2 REG BEVEL

## (undated) DEVICE — SOL CLEARIFY VISUALIZATION LAP

## (undated) DEVICE — DRAPE CORETEMP FLD WRM 56X62IN

## (undated) DEVICE — INTERPULSE SET

## (undated) DEVICE — CART STAPLE RELD 45MM WHT

## (undated) DEVICE — CLIP LIGACLIP XTRA TITANIUM

## (undated) DEVICE — LUBRICANT SURGILUBE 2 OZ

## (undated) DEVICE — SOL WATER STRL IRR 1000ML

## (undated) DEVICE — DRAPE STERI INSTRUMENT 1018

## (undated) DEVICE — SUT CTD VICRYL CT-1 27

## (undated) DEVICE — DRAPE COLUMN DAVINCI XI

## (undated) DEVICE — SET TRI-LUMEN FILTERED TUBE

## (undated) DEVICE — SOL BETADINE 5%

## (undated) DEVICE — SUT 5/0 30IN SILK BLK BRAID

## (undated) DEVICE — GLOVE BIOGEL SKINSENSE PI 6.0

## (undated) DEVICE — MIXER BONE CEMENT

## (undated) DEVICE — TRAY MINOR GEN SURG

## (undated) DEVICE — COVER LIGHT HANDLE

## (undated) DEVICE — SUT 2-0 VICRYL / SH (J417)

## (undated) DEVICE — BLADE SURG CARBON STEEL SZ11

## (undated) DEVICE — SYR ONLY LUER LOCK 20CC

## (undated) DEVICE — DRAPE ABDOMINAL TIBURON 14X11

## (undated) DEVICE — DRAPE ARM DAVINCI XI

## (undated) DEVICE — SCRUB 10% POVIDONE IODINE 4OZ

## (undated) DEVICE — SUT CHROMIC 3-0 SH 27IN GUT

## (undated) DEVICE — PAD PINK TRENDELENBURG POS XL

## (undated) DEVICE — SUT VICRYL+ 1 CT1 18IN

## (undated) DEVICE — GLOVE BIOGEL PI MICRO INDIC 7

## (undated) DEVICE — DRESSING ABSRBNT ISLAND 3.6X8

## (undated) DEVICE — ELECTRODE REM PLYHSV RETURN 9

## (undated) DEVICE — SUT MONOCYRL 4-0 PS2 UND

## (undated) DEVICE — BOWL STERILE LARGE 32OZ

## (undated) DEVICE — STAPLER SKIN ROTATING HEAD

## (undated) DEVICE — BLADE 90X13X1.27MM

## (undated) DEVICE — BLADE SAW SAG 25.40MM X 1.27MM

## (undated) DEVICE — APPLICATOR HEMOBLAST LAPSCP

## (undated) DEVICE — GOWN SURGICAL X-LARGE

## (undated) DEVICE — KIT ANTIFOG W/SPONG & FLUID

## (undated) DEVICE — SEE MEDLINE ITEM 157125

## (undated) DEVICE — CLOSURE SKIN STERI STRIP 1/2X4

## (undated) DEVICE — DRESSING AQUACEL SACRAL 9 X 9

## (undated) DEVICE — SUT CTD VICRYL 1VIL BR 54IN

## (undated) DEVICE — COVER OVERHEAD SURG LT BLUE

## (undated) DEVICE — SUT 2/0 54IN COATED VICRYL

## (undated) DEVICE — LEGGINGS 48X31 INCH

## (undated) DEVICE — BANDAGE ESMARK ELASTIC ST 6X9

## (undated) DEVICE — Device

## (undated) DEVICE — CAUTERY TIP POLISHER

## (undated) DEVICE — APPLICATOR CHLORAPREP ORN 26ML

## (undated) DEVICE — SUT CTD VICRYL VIL BR UR-6

## (undated) DEVICE — IRRIGATOR ENDOSCOPY DISP.

## (undated) DEVICE — STAPLER INT LINEAR ARTC 3.5-45

## (undated) DEVICE — SUT VICRYL BR 1 GEN 27 CT-1

## (undated) DEVICE — DRESSING AQUACEL AG ADV 3.5X12

## (undated) DEVICE — DRAPE SCOPE PILLOW WARMER

## (undated) DEVICE — BLADE SURG #15 CARBON STEEL

## (undated) DEVICE — SUT CTD VICRYL VIL BR SH 27

## (undated) DEVICE — KIT PIN STEINMANN PFC .025X5IN
Type: IMPLANTABLE DEVICE | Site: KNEE | Status: NON-FUNCTIONAL
Removed: 2021-11-17

## (undated) DEVICE — SEE MEDLINE ITEM 154981

## (undated) DEVICE — DRAPE STERI U-SHAPED 47X51IN

## (undated) DEVICE — MANIFOLD 4 PORT